# Patient Record
Sex: FEMALE | NOT HISPANIC OR LATINO | Employment: OTHER | URBAN - METROPOLITAN AREA
[De-identification: names, ages, dates, MRNs, and addresses within clinical notes are randomized per-mention and may not be internally consistent; named-entity substitution may affect disease eponyms.]

---

## 2023-07-21 ENCOUNTER — TRANSFERRED RECORDS (OUTPATIENT)
Dept: HEALTH INFORMATION MANAGEMENT | Facility: CLINIC | Age: 63
End: 2023-07-21

## 2023-11-21 ENCOUNTER — TRANSFERRED RECORDS (OUTPATIENT)
Dept: HEALTH INFORMATION MANAGEMENT | Facility: CLINIC | Age: 63
End: 2023-11-21

## 2024-04-16 ENCOUNTER — TRANSFERRED RECORDS (OUTPATIENT)
Dept: HEALTH INFORMATION MANAGEMENT | Facility: CLINIC | Age: 64
End: 2024-04-16
Payer: COMMERCIAL

## 2024-04-17 ENCOUNTER — TRANSCRIBE ORDERS (OUTPATIENT)
Dept: OTHER | Age: 64
End: 2024-04-17

## 2024-04-17 DIAGNOSIS — C50.919 BREAST CANCER (H): Primary | ICD-10-CM

## 2024-04-18 ENCOUNTER — PATIENT OUTREACH (OUTPATIENT)
Dept: ONCOLOGY | Facility: CLINIC | Age: 64
End: 2024-04-18
Payer: COMMERCIAL

## 2024-04-18 ENCOUNTER — PRE VISIT (OUTPATIENT)
Dept: ONCOLOGY | Facility: CLINIC | Age: 64
End: 2024-04-18
Payer: COMMERCIAL

## 2024-04-18 NOTE — TELEPHONE ENCOUNTER
RECORDS STATUS - BREAST    RECORDS REQUESTED FROM: Dr. Hung Paz [facility]  in regards to breast cancer.   Labs, pathology, imaging/reports, clinic notes, treatment/chemo ect.    Appt Date: TBD NN WQ    Breast Cancer    Records Requested     April 18, 2024 9:14 AM  [facility]   Facility  Dr. Hung Paz [facility]    Outcome I called [phone] (they use a third party vendor for records) #3 (Check Status)  #1 (Medical Provider) Fax: [phone]    I called Ph: [phone] #0 - I spoke to an  and they couldn't confirm if records are accessible through [facility].     I tried to pull records into [facility] but didn't find a match with any records in [place].     I faxed over a formal request for records to Fax: [phone] [facility]       NOTES DETAILS STATUS   OFFICE NOTE from referring provider     OFFICE NOTE from medical oncologist Requested Recs from [facility]    OFFICE NOTE from surgeon     OFFICE NOTE from radiation oncologist     DISCHARGE SUMMARY from hospital     DISCHARGE REPORT from the ER     OPERATIVE REPORT     MEDICATION LIST     CLINICAL TRIAL TREATMENTS TO DATE     LABS     REQUEST BLOCKS FOR ALL BREAST CANCER PTS     PATHOLOGY REPORTS  (Tissue diagnosis, Stage, ER/AZ percentage positive and intensity of staining, HER2 IHC, FISH, and all biopsies from breast and any distant metastasis)                     GENONOMIC TESTING     TYPE:   (Next Generation Sequencing, including Foundation One testing, and Oncotype score)     IMAGING (NEED IMAGES & REPORT)     CT SCANS     MRI     MAMMO     ULTRASOUND     PET     BONE SCAN     BRAIN MRI

## 2024-04-18 NOTE — PROGRESS NOTES
New Patient Oncology Nurse Navigator Note     Referring provider:  Self Referral      Referring Clinic/Organization: Dr. Hung Paz [facility]      Referred to (specialty:) Medical Oncology and Radiation Oncology     Requested provider (if applicable): NA     Date Referral Received: April 17, 2024     Evaluation for:  Breast cancer  Referral received by phone from patient, lives in [place] half of year, needs oncologist when in [place], will be starting radiation treatment. Returning to [place] 5/4. Dr. Hung Paz, [facility], finishing chemo w/o 4/15. She has requested all records.      Clinical History (per Nurse review of records provided):       Records Location:      Records Needed: Medical Records form Dr. Hung Paz [facility]      Additional testing needed prior to consult: ?    Payor: BC / Plan: [employer] Funding Source FED[employer] EMPLOYEE PROGRAM / Product Type: PPO /     April 18, 2024  Called patient this morning to introduce self and role of nurse navigator as well as discuss referral. Patient did not answer her phone so a detailed message was left for patient to call back.     April 25, 2024  Called patient again this afternoon in follow up of self referral. Still working on getting records for review to be able to schedule patient. Patient did not answer again so message was left for her requesting her to follow up. Message sent to records team about update in obtaining records.     April 25, 2024 3:25 PM  Patient returned NN call. She was was diagnosed on Mammogram last year in June. She had a lumpectomy with 4 lymph nodes sampled and were ALL positive. She then had a full lymph node dissection removing 20 lymph nodes and only 1 of the 20 were positive. She then started chemotherapy treatment and just completed it mid April. She will be coming back to [place] on 5/4/24 and will need to establish care with medical oncology and radiation oncology. She lives in [facility] and would prefer that location. Informed  her that we are still working getting records and a message was sent to records team. Slot on hold for 5/9/24 at [facility] with Dr. Jarvis at 11 AM and Dr. Jacob at 3 PM.  Patient verbalized understanding. Will follow up once records are received.    April 26, 2024  Records are received and reviewed from [facility].  Patient underwent a bilateral screening mammogram on 7/5/2023, it showed a mass with obscured margin in the right breast at 10 o'clock position. Ultrasound done on 7/11/2023 showed right breast mass at 10 o'clock position, 7 cm from nipple, 0.8 x 0.6 x 0.6 cm in size. She underwent right sided ultrasound guided biopsy on 7/11/2023. It showed invasive lobular carcinoma. She then underwent bilateral breast MRI on 7/27/23 showed biopsy proven right breast carcinoma 2 x 1.2 x 1.1 cm in size. She under went a right breast lumpectomy with sentinel lymph node dissection. She underwent complete right sided axillary lymph node dissection on 10/12/23, pathology showed metastatic carcinoma involving 1 out of 20 lymph nodes, 4 mm in size, and no extra-joanna extension. She underwent a PET scan on 9/26/23 showed postoperative inflammatory changes in the right breast and axillary region. 10/12/2023 she under went complete right axillary lymph node dissection. She was found to have 4/4 lymph nodes positive for metastatic disease on SLN biopsy procedure. It was recommended that patient receive neoadjuvant treatment with chemo/radiation/hormone therapy. Patient was initiated on treatment with AC + Dose-dense Taxol on 11/21/2023 and completed on 4/16/24.     Referrals sent to [facility] to schedule.     Nakia VALENCIAN, RN   Oncology Nurse Navigator   [facility]   [phone] / [phone]

## 2024-04-23 ENCOUNTER — TRANSFERRED RECORDS (OUTPATIENT)
Dept: HEALTH INFORMATION MANAGEMENT | Facility: CLINIC | Age: 64
End: 2024-04-23
Payer: COMMERCIAL

## 2024-04-23 LAB
ALT SERPL-CCNC: 29 U/L (ref 0–31)
AST SERPL-CCNC: 21 U/L (ref 0–31)
CREATININE (EXTERNAL): 0.6 MG/DL (ref 0.3–1.2)
GFR ESTIMATED (EXTERNAL): 100.8 ML/MIN (ref 60–200)
GLUCOSE (EXTERNAL): 98 MG/DL (ref 70–105)
POTASSIUM (EXTERNAL): 3.6 MEQ/L (ref 3.3–5.1)

## 2024-04-24 ENCOUNTER — TRANSFERRED RECORDS (OUTPATIENT)
Dept: HEALTH INFORMATION MANAGEMENT | Facility: CLINIC | Age: 64
End: 2024-04-24
Payer: COMMERCIAL

## 2024-04-25 ENCOUNTER — PRE VISIT (OUTPATIENT)
Dept: ONCOLOGY | Facility: CLINIC | Age: 64
End: 2024-04-25
Payer: COMMERCIAL

## 2024-04-25 NOTE — TELEPHONE ENCOUNTER
RECORDS STATUS - BREAST    Action    Action Taken 4/25/24  Follow up call to [employer], on hold for 20+mins, spoke w/ Chelsey - advised no prior request received. Was provided w/ fax: [phone] - refaxed request.    Spoke w/ Pt - advised the following:    -Recently completed Chemo   -Pt did CT Scan 4/24/24 @ [facility]  -Pt last saw Dr. Paz: 4/23/24, has follow up 5/2/24    Advised pt of no traction on records received despite pt requesting records to be sent to us. Pt advised they will reach out to a PA @ Dr. Paz's office tomorrow & again try to have records sent to us. Advised pt we would also need a disc. Provided pt w/ writers name, contact information & fax.  3:28 PM    4/26/24  Records from [facility] received, sent to [facility] for STAT upload; sent to  Email, CC Nakia FREGOSO     [employer] Report from  Received, sent to [facility] for STAT upload, sent to  email, CC Nakia FREGOSO     PET, MRI from [facility] resolved to PACS.    Email sent to  [facility] to resolve imaging (Mammograms, Ultrasounds - Breast).  12:29 PM    5/7/24  Records from [facility] received, sent to [facility] for STAT upload  11:52 AM       RECORDS REQUESTED FROM: [facility], [facility]   DATE REQUESTED:    NOTES DETAILS STATUS   OFFICE NOTE from medical oncologist [facility] - Requested 4/18 & 4/25 [facility]  Dr. Hung Paz    [facility]  Dr. Isaiah Oakley: 10/6/23   OFFICE NOTE from surgeon  -  Dr. Shyanne Love: 10/31/23   OPERATIVE REPORT [facility] -  10/12/23: Right Axillary Node Dissection  8/31/23: Right Breast Lumpectomy   LABS     PATHOLOGY REPORTS  (Tissue diagnosis, Stage, ER/[place] percentage positive and intensity of staining, HER2 IHC, FISH, and all biopsies from breast and any distant metastasis)                 [facility], Reports in [facility]  10/12/23: [id number]    [facility]  8/31/23: [id number]  7/11/23: [id number]    GENONOMIC TESTING     TYPE:   (Next Generation Sequencing, including Foundation One testing, and Oncotype score) [facility] - Requested 4/25 7/20/23   IMAGING (NEED IMAGES & REPORT)     CT SCANS Requested 4/25 [facility]  4/24/24   MRI PENDING RESOLUTION FROM [facility] PACS 4/26 [facility]  7/27/23   MAMMO PENDING RESOLUTION FROM  PACS 4/26 [facility]  8/30/23, 7/11/23, 7/5/23, 3/7/18, 4/29/15   ULTRASOUND PENDING RESOLUTION FROM [facility] PACS 4/26 [facility]  7/11/23   PET PACS [facility]  9/26/23   [employer] Tracking: [facility] [id number]

## 2024-05-09 ENCOUNTER — ONCOLOGY VISIT (OUTPATIENT)
Dept: ONCOLOGY | Facility: HOSPITAL | Age: 64
End: 2024-05-09
Attending: INTERNAL MEDICINE
Payer: COMMERCIAL

## 2024-05-09 ENCOUNTER — OFFICE VISIT (OUTPATIENT)
Dept: RADIATION ONCOLOGY | Facility: CLINIC | Age: 64
End: 2024-05-09
Attending: INTERNAL MEDICINE
Payer: COMMERCIAL

## 2024-05-09 VITALS
RESPIRATION RATE: 18 BRPM | HEIGHT: 63 IN | WEIGHT: 154.1 LBS | HEART RATE: 77 BPM | BODY MASS INDEX: 27.3 KG/M2 | SYSTOLIC BLOOD PRESSURE: 122 MMHG | OXYGEN SATURATION: 97 % | DIASTOLIC BLOOD PRESSURE: 72 MMHG

## 2024-05-09 VITALS
TEMPERATURE: 98 F | SYSTOLIC BLOOD PRESSURE: 126 MMHG | RESPIRATION RATE: 16 BRPM | WEIGHT: 155.3 LBS | HEART RATE: 89 BPM | DIASTOLIC BLOOD PRESSURE: 59 MMHG | OXYGEN SATURATION: 98 % | BODY MASS INDEX: 27.51 KG/M2

## 2024-05-09 DIAGNOSIS — Z17.0 MALIGNANT NEOPLASM OF UPPER-OUTER QUADRANT OF RIGHT BREAST IN FEMALE, ESTROGEN RECEPTOR POSITIVE (H): ICD-10-CM

## 2024-05-09 DIAGNOSIS — Z78.0 MENOPAUSE: Primary | ICD-10-CM

## 2024-05-09 DIAGNOSIS — Z79.811 LONG TERM CURRENT USE OF AROMATASE INHIBITOR: ICD-10-CM

## 2024-05-09 DIAGNOSIS — C50.411 MALIGNANT NEOPLASM OF UPPER-OUTER QUADRANT OF RIGHT BREAST IN FEMALE, ESTROGEN RECEPTOR POSITIVE (H): ICD-10-CM

## 2024-05-09 DIAGNOSIS — C50.411 MALIGNANT NEOPLASM OF UPPER-OUTER QUADRANT OF RIGHT BREAST IN FEMALE, ESTROGEN RECEPTOR POSITIVE (H): Primary | ICD-10-CM

## 2024-05-09 DIAGNOSIS — Z17.0 MALIGNANT NEOPLASM OF UPPER-OUTER QUADRANT OF RIGHT BREAST IN FEMALE, ESTROGEN RECEPTOR POSITIVE (H): Primary | ICD-10-CM

## 2024-05-09 PROCEDURE — 99205 OFFICE O/P NEW HI 60 MIN: CPT | Mod: 25 | Performed by: STUDENT IN AN ORGANIZED HEALTH CARE EDUCATION/TRAINING PROGRAM

## 2024-05-09 PROCEDURE — 99213 OFFICE O/P EST LOW 20 MIN: CPT | Performed by: INTERNAL MEDICINE

## 2024-05-09 PROCEDURE — 77263 THER RADIOLOGY TX PLNG CPLX: CPT | Performed by: STUDENT IN AN ORGANIZED HEALTH CARE EDUCATION/TRAINING PROGRAM

## 2024-05-09 PROCEDURE — 99214 OFFICE O/P EST MOD 30 MIN: CPT | Mod: 27 | Performed by: STUDENT IN AN ORGANIZED HEALTH CARE EDUCATION/TRAINING PROGRAM

## 2024-05-09 PROCEDURE — 99417 PROLNG OP E/M EACH 15 MIN: CPT | Performed by: STUDENT IN AN ORGANIZED HEALTH CARE EDUCATION/TRAINING PROGRAM

## 2024-05-09 PROCEDURE — 99205 OFFICE O/P NEW HI 60 MIN: CPT | Performed by: INTERNAL MEDICINE

## 2024-05-09 PROCEDURE — G2211 COMPLEX E/M VISIT ADD ON: HCPCS | Performed by: INTERNAL MEDICINE

## 2024-05-09 RX ORDER — TRAZODONE HYDROCHLORIDE 300 MG/1
300 TABLET ORAL
COMMUNITY
Start: 2023-02-16

## 2024-05-09 RX ORDER — ANASTROZOLE 1 MG/1
1 TABLET ORAL
COMMUNITY
Start: 2024-04-23 | End: 2024-09-29

## 2024-05-09 RX ORDER — MONTELUKAST SODIUM 10 MG/1
1 TABLET ORAL EVERY EVENING
COMMUNITY
Start: 2022-12-07

## 2024-05-09 RX ORDER — LOPERAMIDE HCL 2 MG
CAPSULE ORAL
Qty: 30 CAPSULE | Refills: 0 | Status: SHIPPED | OUTPATIENT
Start: 2024-05-15 | End: 2024-08-15

## 2024-05-09 RX ORDER — POTASSIUM CHLORIDE 750 MG/1
1 TABLET, EXTENDED RELEASE ORAL
COMMUNITY
Start: 2024-03-06

## 2024-05-09 RX ORDER — VERAPAMIL HYDROCHLORIDE 180 MG/1
180 CAPSULE, EXTENDED RELEASE ORAL DAILY
COMMUNITY
Start: 2023-03-08

## 2024-05-09 RX ORDER — FLUTICASONE PROPIONATE 50 MCG
2 SPRAY, SUSPENSION (ML) NASAL DAILY
COMMUNITY
Start: 2022-12-09

## 2024-05-09 RX ORDER — IPRATROPIUM BROMIDE 21 UG/1
SPRAY, METERED NASAL
COMMUNITY

## 2024-05-09 RX ORDER — DULOXETIN HYDROCHLORIDE 60 MG/1
60 CAPSULE, DELAYED RELEASE ORAL DAILY
COMMUNITY
Start: 2023-08-08 | End: 2024-08-07

## 2024-05-09 RX ORDER — CETIRIZINE HYDROCHLORIDE 10 MG/1
10 TABLET ORAL DAILY
COMMUNITY

## 2024-05-09 RX ORDER — ROLAPITANT 90 MG/1
TABLET ORAL
COMMUNITY
Start: 2023-12-12

## 2024-05-09 RX ORDER — PROCHLORPERAZINE MALEATE 10 MG
10 TABLET ORAL EVERY 6 HOURS PRN
Qty: 30 TABLET | Refills: 2 | Status: SHIPPED | OUTPATIENT
Start: 2024-05-15 | End: 2024-08-15

## 2024-05-09 RX ORDER — CEFUROXIME AXETIL 250 MG/1
6 TABLET ORAL PRN
COMMUNITY
Start: 2023-11-24

## 2024-05-09 ASSESSMENT — PAIN SCALES - GENERAL
PAINLEVEL: NO PAIN (0)
PAINLEVEL: NO PAIN (0)

## 2024-05-09 NOTE — LETTER
5/9/2024         RE: Lizbet Nunez  [address] [place] [address]        Dear Colleague,    Thank you for referring your patient, Lizbet Nunez, to the [facility] RADIATION ONCOLOGY [place]. Please see a copy of my visit note below.    [facility] Radiation Oncology Consult Note     Patient: Lizbet Nunez  MRN: [id number]  Date of Service: 05/09/2024          Referred MD Ciro  No address on file       Dear Ms Lizbet Nunez:    Thank you very much for referring this patient for consideration of radiotherapy. As you know Ms. Nunez is a 63 year old female with a diagnosis of right breast invasive ductal and lobular carcinoma, stage IIIA (pT1c, pN2a cM0)  HR+; HER2- s/p right lumpectomy and sentinel lymph node biopsy 8/31/23 with negative margins and 4 of 4 lymph nodes positive followed by axillary dissection with an additional 1 of 20 lymph nodes positive. She completed adjuvant chemotherapy in [place] and was seen today for consideration of adjuvant radiation therapy.     HISTORY OF PRESENT ILLNESS:   Ms. Nunez is a 63 year old female who had an abnormal screening mammogram    7/5/2023 screening mammogram: Mass with obscured margins in the right breast at the 10 o'clock position posterior depth.    7/11/2023 ultrasound-guided core needle biopsy: 10 o'clock position in the right breast 7 cm from the nipple, pathology ([id number]): invasive lobular carcinoma, grade 2.  Associated LCIS.  ER positive (90%), VT positive (100%) HER2/kenny negative.    7/21/2023 genetics, [facility]: Positive for 1 variant of uncertain significance, BAP 1 C8.1857C> T    7/27/2023 MRI: 2 x 1.2 x 1.1 cm irregularly enhancing mass in the right breast 10:00 middle depth.  No other suspicious areas or enlarged lymph nodes.    8/31/2023 right breast lumpectomy with sentinel lymph node biopsy, pathology ([id number]):  Invasive carcinoma with mixed ductal and pleomorphic lobular features, grade 2, 2 cm.  Margins negative (4  mm anterior and medial).  Associated DCIS, grade 2 cribriform.  No EIC.  Associated LCIS.  Margins negative (1 mm posterior to DCIS).  No lymphovascular invasion.    Right axillary sentinel lymph node biopsy: 4 of 4 lymph nodes positive (4/4), largest 5 mm.  No extranodal extension.  2 lymph nodes with macrometastases, 2 lymph nodes with micrometastases.  pT1c pN2a    9/26/2023 PET/CT: Postoperative changes upper outer right breast with seroma measuring 2.6 x 3.7 x 3.1 cm.  Postoperative changes right axilla with low-level inflammatory uptake.  No evidence of locally recurrent residual malignancy.    10/12/2023 right axillary dissection, pathology ([id number]): Metastatic adenocarcinoma involving 1 of 20 lymph nodes.  4 mm in size.  No extranodal extension.    Referred to lymphedema therapy but never went.  Spent winter in [place]:  11/21/2023 - 4/16/2024 adjuvant chemotherapy: AC Dose-dense Taxol     4/24/2024 CT: Postsurgical changes right breast and axilla.  No adenopathy.  Small pericardial effusion.    She continues to have some fatigue, peripheral neuropathy and nail changes following chemotherapy.  Her alopecia is slowly resolving.  She has some tightness in the right axilla with scarring/firmness.  She has not noted any swelling of the upper extremity or breast (but recognizes right sided lymphedema once pointed out)    Met with heme-oncMD Jarvis with plans to begin anastrozole anytime and start abemaciclib after radiation    No tobacco use  Family history positive for breast cancer in both maternal (65) and paternal (70s) aunts    CHEMOTHERAPY HISTORY: Concurrent Chemotherapy: No  Neoadjuvant dd AC + T  Adjuvant anastrozole and abemaciclib-she is currently thinking she is going to hold off on starting anastrozole but discussed she may begin during radiation    RADIATION THERAPY HISTORY: Prior Radiation: No    IMPLANTED CARDIAC DEVICE: none     PREGNANCY: N/A    Current Outpatient Medications   Medication  Sig Dispense Refill     cetirizine (ZYRTEC) 10 MG tablet Take 10 mg by mouth daily       DULoxetine (CYMBALTA) 60 MG capsule Take 60 mg by mouth daily       fluticasone (FLONASE) 50 MCG/ACT nasal spray Spray 2 sprays into both nostrils daily       ipratropium (ATROVENT) 0.03 % nasal spray USE 1 TO 2 SPRAYS IN EACH NOSTRIL THREE TIMES DAILY AS NEEDED FOR ALLERGIES       montelukast (SINGULAIR) 10 MG tablet Take 1 tablet by mouth every evening       potassium chloride ER (K-TAB/KLOR-CON) 10 MEQ CR tablet Take 1 tablet by mouth daily at 2 pm       SUMAtriptan (IMITREX STATDOSE) 6 MG/0.5ML pen injector kit Inject 6 mg Subcutaneous as needed       traZODone HCl 300 MG TABS Take 300 mg by mouth       VARUBI, 180 MG DOSE, 2 x 90 MG tablet        verapamil ER (VERELAN) 180 MG 24 hr capsule Take 180 mg by mouth daily       anastrozole (ARIMIDEX) 1 MG tablet Take 1 tablet by mouth daily at 2 pm (Patient not taking: Reported on 5/9/2024)       [START ON 5/15/2024] loperamide (IMODIUM) 2 MG capsule Start with 2 caps (4 mg), then take one cap (2 mg) after each diarrheal stool as needed. Do not use more than 8 caps (16 mg) per day. 30 capsule 0     [START ON 5/15/2024] prochlorperazine (COMPAZINE) 10 MG tablet Take 1 tablet (10 mg) by mouth every 6 hours as needed for nausea or vomiting 30 tablet 2     No past medical history on file.  Past Surgical History:   Procedure Laterality Date     IR CHEST PORT PLACEMENT > 5 YRS OF AGE  11/7/2023     Allergies   Allergen Reactions     Aripiprazole Other (See Comments)     Severe Paranoia     Iodinated Contrast Media Other (See Comments)     PN: LW CM1: CONTRAST- NKA Reaction :     No family history on file.  Social History     Socioeconomic History     Marital status:      Spouse name: Not on file     Number of children: Not on file     Years of education: Not on file     Highest education level: Not on file   Occupational History     Not on file   Tobacco Use     Smoking status:  Not on file     Smokeless tobacco: Not on file   Substance and Sexual Activity     Alcohol use: Not on file     Drug use: Not on file     Sexual activity: Not on file   Other Topics Concern     Not on file   Social History Narrative     Not on file     Social Determinants of Health     Financial Resource Strain: Not on file   Food Insecurity: Not on file   Transportation Needs: Not on file   Physical Activity: Not on file   Stress: Not on file   Social Connections: Not on file   Interpersonal Safety: Not on file   Housing Stability: Not on file        REVIEW OF SYMPTOMS:  A full 14-point review of systems was performed. Pertinent findings are noted in the HPI.    General  Constitutional  Constitutional (WDL): Exceptions to WDL  Fatigue: Fatigue relieved by rest  EENT  Eye Disorders  Eye Disorder (WDL): All eye disorder elements are within defined limits (wears glasses)  Ear Disorders  Ear Disorder (WDL): All ear disorder elements are within defined limits  Respiratory  Respiratory  Respiratory (WDL): All respiratory elements are within defined limits  Cardiovascular  Cardiovascular  Cardiovascular (WDL): Exceptions to WDL (no pacemaker, port-a-cath in place on left chest)  Gastrointestinal  Gastrointestinal  Gastrointestinal (WDL): Exceptions to WDL  Anorexia: Loss of appetite without alteration in eating habits  Dysgeusia: Altered taste but no change in diet  Constipation: Occasional or intermittent symptoms OR occasional use of stool softeners, laxatives, dietary modification, or enema  Musculoskeletal  Musculoskeletal and Connective Tissue Disorders  Musculoskeletal & Connective (WDL): All musculoskeletal & connective elements are within defined limits  Integumentary  Integumentary  Integumentary (WDL): Exceptions to WDL (Right breast sugery 8/2023, darkening of skin since chemotherapy- last chemo 4/23/2024)  Alopecia: Hair loss of less than 50% of normal for that individual that is not obvious from a distance  but only on close inspection OR a different hair style may be required to cover the hair loss but it does not require a wig or hair piece to camouflage  Neurological  Neurosensory  Neurosensory (WDL): Exceptions to WDL  Peripheral Motor Neuropathy: Asymptomatic OR clinical or diagnostic observations only (hands & feet R>L)  Ataxia: Asymptomatic OR clinical or diagnostic observations only OR intervention not indicated  Peripheral Sensory Neuropathy: Asymptomatic (hands & feet R>L)  Dizziness: Mild unsteadiness or sensation of movement (occasionally)  Genitourinary/Reproductive  Genitourinary  Genitourinary (WDL): Exceptions to WDL  Urinary Frequency: Present (nocturia x2)  Lymphatic  Lymph System Disorders  Lymph (WDL): All lymph elements are within defined limits  Pain  Pain Score: No Pain (0)  AUA Assessment                                                              Accompanied by  Accompanied By: spouse ()    ECOG Status: 1 - Can't do physically strenuous work, but fully ambyulatory and can do light sedentary work    KPS Score: 90% Can perform normal activity, minor signs of disease    Pain Management Plan: N/A    Recent Labs: No results found for this or any previous visit (from the past 168 hour(s)).    Imaging: Imaging results 6 weeks:No results found.    Pathology:   No results found for this or any previous visit (from the past 8760 hour(s)).              Objective:          PHYSICAL EXAMINATION:    /59 (BP Location: Right arm, Patient Position: Sitting, Cuff Size: Adult Regular)   Pulse 89   Temp 98  F (36.7  C) (Oral)   Resp 16   Wt 70.4 kg (155 lb 4.8 oz)   SpO2 98%   BMI 27.51 kg/m      Gen: Alert, in NAD pleasant interactive, well nourished  Eyes: EOMI, sclera anicteric  HENT     Head: NC/AT, alopecia resolving  Pulm:  No wheezing, stridor or respiratory distress  Chest: Bilateral good breast symmetry.   Well-healed surgical scar of the right breast and axilla consistent with a  recent history of surgery.  No surrounding erythema, warmth or drainage.  Axillary firmness and fullness without discrete axillary nor supraclavicular LAD.    Right upper extremity lymphedema.  Musculoskeletal: Normal muscle bulk and tone  Skin: Normal tone and turgor, warm, dry, intact  Neurologic: A/Ox3, face symmetric, speech fluent, no focal motor deficits, gait normal and unaided  Psychiatric: Appropriate mood and affect       Intent of Therapy: Curative  Patient on concurrent Herceptin No  Adjuvant hormonal therapy: Yes Agent: Anastrozole (arimidex)  Start: Following radiation  Chemotherapy: Adjuvant ddAC+ T completed  Intended fractionation schedule 4256 cGy in 16 fractions +1000 cGy in 4 fraction boost    Breast cancer risk factors:   No obstetric history on file.  LMP Dates from Last 4 Encounters:   No data found for LMP      Side effects that may occur during or within weeks after radiation therapy    Fatigue and general weakness  Darkening, irritation, itchiness, redness, dryness, erythema, peeling, scabbing, ulceration and contraction of the skin of the breast and chest  Swelling of the breast  Loss of armpit hair  Lung irritation  Decrease in appetite    Side effects that may occur months or years after radiation therapy    Development of another tumor or cancer  Thickening, telangiectasias (development of spider like blood vessels in the skin) and ulceration of the skin of the breast and chest  Firming, fibrosis (scar tissue), fat necrosis, and distortion of the breast  Poor healing after a trauma or surgery in the irradiated area  Nerve damage resulting in loss of arm strength and sensation  Coronary artery blockage causing angina pain or a heart attack  Lung inflammation of fibrosis causing cough, fever and shortness of breath  Fracture of the ribs  Swellingof an arm and hand    The risks, benefits and alternatives to radiation therapy were outlined with the patient. All questions were answered and a  consent was signed.     Impression   63-year-old woman with right breast invasive ductal and lobular carcinoma, stage IIIA (pT1c, pN2a cM0)  HR+; HER2- s/p right lumpectomy and sentinel lymph node biopsy 8/31/23 with negative margins and 4 of 4 lymph nodes positive followed by axillary dissection with an additional 1 of 20 lymph nodes positive. She completed adjuvant chemotherapy ddAC+T in [place].    Right upper extremity lymphedema    Visit Dx:  (C50.411,  Z17.0) Malignant neoplasm of upper-outer quadrant of right breast in female, estrogen receptor positive (H)  (primary encounter diagnosis)  Plan: Lymphedema Therapy  Referral       Cancer Staging   Malignant neoplasm of upper-outer quadrant of right breast in female, estrogen receptor positive (H)  Staging form: Breast, AJCC 8th Edition  - Pathologic stage from 8/31/2023: Stage IB (pT1c, pN2a, cM0, G2, ER+, MI+, HER2-) - Signed by Tony Jarvis MD on 5/9/2024      Assessment & Plan:   Discussed adjuvant radiation therapy with patient and her .    The indications for, process of, alternatives, potential side effects and complications of RT to the right breast and regional lymph nodes were discussed.    They asked multiple questions which were answered to their satisfaction and they verbalized understanding.    They wish to proceed with adjuvant radiation therapy and consent is signed today.  They will return to clinic on next week for CT simulation for RT planning.     Anticipate 4256 cGy in 16 fractions +1000 cGy in 4 fraction boost.    She has already developed lymphedema of the right upper extremity so referral to lymphedema therapy placed.  Also has some scarring and fibrosis in the axilla she will work on exercises at home as well.     Thank you for allowing me to participate in the care of this patient. Feel free to contact me with all questions or concerns.      Total time of this visit, including time spent face-to-face with patient and  "or via video/audio, and also in preparing for today's visit for MDM and documentation. Medical decision-making included consideration and possible diagnoses, management options, complex record review, review of diagnostic tests and information, consideration and discussion of significant complications based on comorbidities, discussion with providers involved in the care of the patient.     90 Minutes spent.      Sincerely,      Sherri Jacob MD  Department of Radiation Oncology   [facility] Radiation Oncology  Tel: [phone]  Page: [phone]    [facility]  [address] [place] [address]     [facility]   [address] [place] [address]    CC:  Patient Care Team:  Leonie Manning MD as PCP - General (Family Medicine)  Tony Jarvis MD as MD (Hematology)  Sherri Jacob MD as MD (Radiation Oncology)          Considerations for radiation treatment   Pregnancy status: Female age 55+   Implanted Cardiac Devices: No, Port a cath in left chest   Any previous radiation therapy: No    Oncology Rooming Note    May 9, 2024 3:25 PM   Lizbet Nunez is a 63 year old female who presents for:    Chief Complaint   Patient presents with     Oncology Clinic Visit     Consult with Dr. Jacob     Initial Vitals: /59 (BP Location: Right arm, Patient Position: Sitting, Cuff Size: Adult Regular)   Pulse 89   Temp 98  F (36.7  C) (Oral)   Resp 16   Wt 70.4 kg (155 lb 4.8 oz)   SpO2 98%   BMI 27.51 kg/m   Estimated body mass index is 27.51 kg/m  as calculated from the following:    Height as of an earlier encounter on 5/9/24: 1.6 m (5' 3\").    Weight as of this encounter: 70.4 kg (155 lb 4.8 oz). Body surface area is 1.77 meters squared.  No Pain (0) Comment: Data Unavailable   No LMP recorded.  Allergies reviewed: Yes  Medications reviewed: Yes    Medications: Medication refills not needed today.  Pharmacy name entered into [employer]: [id number] - [place], [place] - " [address] [facility] [address]    Frailty Screening:   Is the patient here for a new oncology consult visit in cancer care? 2. No      Clinical concerns: Patient here ambulatory accompanied by  for radiation consult for her breast cancer.  Patient states she completed her chemotherapy in [place] on 4/23/2024.  Patient continues to have neuropathies in her hands and feet but feels this is improving.  Patient also had a lot of darkening of her skin during the chemotherapy which she feels is improving.  Patient had alopecia but her hair is starting to grow back.  20 minutes spent in review of radiation process and potential side effects.  Written information given for review.  Seen by Dr. Jacob.  Plan return to clinic for follow-up and/or CT simulation as directed by provider.  Dr. Jacob was notified.    Radiation Therapy Patient Education    Person involved with teaching: Patient and     Patient educational needs for self management of treatment-related side effects assessment completed.  [facility] Patient Ed tab contains Patient Learning Assessment    Education Materials Given  Managing Side Effects of Radiation Therapy: Care Instructions, Learning About External Beam Radiation Treatment, Dealing With Being Tired From Cancer Treatment: Care Instructions, Radiation Treatment For Cancer, Radiation Therapy to the Breast Guidelines, Exercising After a Breast Cancer Surgery: Care Instructions, Oncology Supportive Care Services , Welcome Letter, Insurance PA Information, and Map [facility]    Educational Topics Discussed  Side effects expected, Skin care, and When to call MD/RN    Response To Teaching  More review necessary    GYN Only  Vaginal Dilator-given and educated: N/A    Referrals sent: None    Chemotherapy?  Yes: Last chemotherapy completed 4/23/2024 in [place], patient has now established care with Dr. Jarvis medical oncology for hormonal therapy here in [place].          Valreie  DAINA Stephenson RN                Again, thank you for allowing me to participate in the care of your patient.        Sincerely,        Sherri Jacob MD

## 2024-05-09 NOTE — LETTER
"    5/9/2024         RE: Lizbet Nunez  [address] [place] [address]        Dear Colleague,    Thank you for referring your patient, Lizbet Nunez, to the [facility]. Please see a copy of my visit note below.    Oncology Rooming Note    May 9, 2024 11:08 AM   Lizbet Nunez is a 63 year old female who presents for:    Chief Complaint   Patient presents with     Oncology Clinic Visit     Malignant neoplasm of right breast in female, estrogen receptor positive, unspecified site of breast     Initial Vitals: /72 (BP Location: Left arm, Patient Position: Sitting, Cuff Size: Adult Regular)   Pulse 77   Resp 18   Ht 1.6 m (5' 3\")   Wt 69.9 kg (154 lb 1.6 oz)   SpO2 97%   BMI 27.30 kg/m   Estimated body mass index is 27.3 kg/m  as calculated from the following:    Height as of this encounter: 1.6 m (5' 3\").    Weight as of this encounter: 69.9 kg (154 lb 1.6 oz). Body surface area is 1.76 meters squared.  No Pain (0) Comment: Data Unavailable   No LMP recorded.  Allergies reviewed: Yes  Medications reviewed: Yes    Medications: Medication refills not needed today.  Pharmacy name entered into [employer]: [id number] - [facility] [address] AT [facility]    Frailty Screening:   Is the patient here for a new oncology consult visit in cancer care? 1. Yes. Over the past month, have you experienced difficulty or required a caregiver to assist with:   1. Balance, walking or general mobility (including any falls)? NO  2. Completion of self-care tasks such as bathing, dressing, toileting, grooming/hygiene?  NO  3. Concentration or memory that affects your daily life?  NO       Clinical concerns: new patient consult.       Jessenia Solorzano MA              [facility] Hematology and Oncology Consult Note    Patient: Lizbet Nunez  MRN: [id number]  Date of Service: May 9, 2024       Reason for Visit    Chief Complaint   Patient presents with     " Oncology Clinic Visit     Malignant neoplasm of right breast in female, estrogen receptor positive, unspecified site of breast         Assessment/Plan    ECOG Performance 0 - Independent    #.  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  ER positive, DC positive, HER2 negative (0 by IHC)     I reviewed her extensive medical history and available records.  Requested most recent clinic visit notes from [facility].  At this point, she completed surgery and adjuvant chemotherapy.  She will be meeting with Dr. Jacob this evening to discuss about adjuvant radiation therapy.   Today, I discussed about adjuvant endocrine therapy and CDK 4/6 inhibitor.  I advised that she can start anastrozole anytime.  Total duration of endocrine therapy is over 10 years.  She is okay to hold anastrozole during radiation therapy if advised by Dr. Jacob.   I discussed about adjuvant abemaciclib.  She clearly has indication for adjuvant CDK 4/6 inhibitor based on presence of 4 positive lymph nodes.  I reviewed the side effects and schedule.  I will plan to start after completion of radiation therapy.  I gave her handout to review.  She consented me to obtain prior authorization.   Follow-up with me about a week after completion of radiation.    #.  Small pericardial effusion of unknown clinical significance   Recent CT scan from a couple weeks ago showed small pericardial effusion.  She does not have any symptoms.  I recommended to continue to monitor clinically at this point.  We might need to follow-up echocardiogram with symptoms.    #.  Bone health  -She takes multivitamin a day.  Provided daily requirement amount of calcium and vitamin D.  -Advised her to obtain DEXA scan for baseline.  She never had one before.  -Discussed about weightbearing exercises.    Encounter Diagnoses:    Problem List Items Addressed This Visit          Oncology  Diagnoses    Malignant neoplasm of upper-outer quadrant of right breast in female, estrogen receptor positive (H)    Relevant Medications    prochlorperazine (COMPAZINE) 10 MG tablet (Start on 5/15/2024)    loperamide (IMODIUM) 2 MG capsule (Start on 5/15/2024)    Other Relevant Orders    CBC with platelets differential    Comprehensive metabolic panel       Other    Long term current use of aromatase inhibitor     Other Visit Diagnoses       Menopause    -  Primary    Relevant Orders    DX Bone Density            ______________________________________________________________________________    Staging History   Cancer Staging   Malignant neoplasm of upper-outer quadrant of right breast in female, estrogen receptor positive (H)  Staging form: Breast, AJCC 8th Edition  - Pathologic stage from 8/31/2023: Stage IB (pT1c, pN2a, cM0, G2, ER+, UT+, HER2-) - Signed by Tony Jarvis MD on 5/9/2024        History    Ms. Lizbet Nunez is a very pleasant 63 year old female presented today accompanied byNestor for history of right breast cancer management.    7/2023-screening mammogram detected right breast cancer.  Sequent diagnostic mammogram and ultrasound demonstrated 0.8 x 0.6 x 0.6 cm hypoechoic mass 10 o'clock position in the right breast.  No suspicious lymph nodes in the right axilla.   -Core needle biopsy showed invasive lobular carcinoma with pleomorphic features, grade 2.  ER 90%, %, HER2 0 by IHC.    7/27/2023-bilateral breast MRI showed 2 x 1.2 x 1.1 cm right breast cancer without evidence of lymphadenopathy.    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    8/31/2023-right breast lumpectomy and right axillary sentinel lymph node biopsy.   Invasive carcinoma with mixed ductal and pleomorphic lobular features, grade 2.  2 cm.  Margins were negative.   DCIS present with closest skin margin to 1 mm.   4/4 sentinel lymph nodes were positive (2 macro mets and 2 micro mets) for metastatic carcinoma  "with largest metastatic deposit of 5 mm.  Negative for extranodal extension.   DCIS and LCIS present.  ER 90%, MA 80%, HER2 1+ by IHC.   pT1c N2a M0    9/26/2023-PET scan showed no evidence of distant metastasis    10/12/2023-right axillary lymph node dissection   1 of 20 lymph node showed metastatic adenocarcinoma, 4 mm in size.  No extranodal extension.    11/21/2023-4/23/2024-completed adjuvant chemotherapy with dose dense AC followed by weekly paclitaxel in [place].    4/24/2024-CT scan showed interval development of small pericardial effusion.  No pleural effusion.  She has seroma in the right axilla.  No next of malignancy.     Today, she reported that she is slowly recovering from chemotherapy side effects.  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  They are not painful.  Her energy level is slowly improving.  She does not have any unusual headaches.  No bone pain.   She was prescribed anastrozole but she has not started yet.    LMP 20 years ago.    Review of systems.  Apart from describing in history, the remainder of comprehensive ROS was negative.    Past History    No past medical history on file.  Past Surgical History:   Procedure Laterality Date     IR CHEST PORT PLACEMENT > 5 YRS OF AGE  11/7/2023     No family history on file.  Social History     Socioeconomic History     Marital status:        Allergies    Allergies   Allergen Reactions     Aripiprazole Other (See Comments)     Severe Paranoia     Iodinated Contrast Media Other (See Comments)     PN: LW CM1: CONTRAST- NKA Reaction :       Physical Exam    /72 (BP Location: Left arm, Patient Position: Sitting, Cuff Size: Adult Regular)   Pulse 77   Resp 18   Ht 1.6 m (5' 3\")   Wt 69.9 kg (154 lb 1.6 oz)   SpO2 97%   BMI 27.30 kg/m      General: alert, awake, not in acute distress  HEENT: Head: Normal, normocephalic, atraumatic.  Eye: " Normal external eye, conjunctiva, lids cornea, YUMIKO.  Nose: Normal external nose, mucus membranes and septum.  Pharynx: Normal buccal mucosa. Normal pharynx.  Neck / Thyroid: Supple, no masses, nodes, nodules or enlargement.  Lymphatics: No abnormally enlarged lymph nodes.  Chest: Normal chest wall and respirations. Clear to auscultation.  Heart: S1 S2 RRR, no murmur.   Abdomen: abdomen is soft without significant tenderness, masses, organomegaly or guarding  Extremities: normal strength, tone, and muscle mass  Skin: normal. no rash or abnormalities  CNS: non focal.    Lab Results    No results found for this or any previous visit (from the past 168 hour(s)).    Imaging Results    No results found.    60 minutes spent on the date of the encounter doing chart review, history and exam, documentation, communication of the treatment plan with the care team and further activities as noted above.    Signed by: Tony Jarvis MD       Again, thank you for allowing me to participate in the care of your patient.        Sincerely,        Tony Jarvis MD

## 2024-05-09 NOTE — PROGRESS NOTES
[facility] Radiation Oncology Consult Note     Patient: Lizbet Nunez  MRN: [id number]  Date of Service: 05/09/2024          Referred MD Ciro  No address on file       Dear Ms Lizbet Nunez:    Thank you very much for referring this patient for consideration of radiotherapy. As you know Ms. Nunez is a 63 year old female with a diagnosis of right breast invasive ductal and lobular carcinoma, stage IIIA (pT1c, pN2a cM0)  HR+; HER2- s/p right lumpectomy and sentinel lymph node biopsy 8/31/23 with negative margins and 4 of 4 lymph nodes positive followed by axillary dissection with an additional 1 of 20 lymph nodes positive. She completed adjuvant chemotherapy in [place] and was seen today for consideration of adjuvant radiation therapy.     HISTORY OF PRESENT ILLNESS:   Ms. Nunez is a 63 year old female who had an abnormal screening mammogram    7/5/2023 screening mammogram: Mass with obscured margins in the right breast at the 10 o'clock position posterior depth.    7/11/2023 ultrasound-guided core needle biopsy: 10 o'clock position in the right breast 7 cm from the nipple, pathology ([id number]): invasive lobular carcinoma, grade 2.  Associated LCIS.  ER positive (90%), [place] positive (100%) HER2/kenny negative.    7/21/2023 genetics, [facility]: Positive for 1 variant of uncertain significance, BAP 1 [id number]> T    7/27/2023 MRI: 2 x 1.2 x 1.1 cm irregularly enhancing mass in the right breast 10:00 middle depth.  No other suspicious areas or enlarged lymph nodes.    8/31/2023 right breast lumpectomy with sentinel lymph node biopsy, pathology ([id number]):  Invasive carcinoma with mixed ductal and pleomorphic lobular features, grade 2, 2 cm.  Margins negative (4 mm anterior and medial).  Associated DCIS, grade 2 cribriform.  No EIC.  Associated LCIS.  Margins negative (1 mm posterior to DCIS).  No lymphovascular invasion.    Right axillary sentinel lymph node biopsy: 4 of 4 lymph nodes positive (4/4), largest 5  mm.  No extranodal extension.  2 lymph nodes with macrometastases, 2 lymph nodes with micrometastases.  pT1c pN2a    9/26/2023 PET/CT: Postoperative changes upper outer right breast with seroma measuring 2.6 x 3.7 x 3.1 cm.  Postoperative changes right axilla with low-level inflammatory uptake.  No evidence of locally recurrent residual malignancy.    10/12/2023 right axillary dissection, pathology ([id number]): Metastatic adenocarcinoma involving 1 of 20 lymph nodes.  4 mm in size.  No extranodal extension.    Referred to lymphedema therapy but never went.  Spent winter in [place]:  11/21/2023 - 4/16/2024 adjuvant chemotherapy: AC Dose-dense Taxol     4/24/2024 CT: Postsurgical changes right breast and axilla.  No adenopathy.  Small pericardial effusion.    She continues to have some fatigue, peripheral neuropathy and nail changes following chemotherapy.  Her alopecia is slowly resolving.  She has some tightness in the right axilla with scarring/firmness.  She has not noted any swelling of the upper extremity or breast (but recognizes right sided lymphedema once pointed out)    Met with heme-oncMD Jarvis with plans to begin anastrozole anytime and start abemaciclib after radiation    No tobacco use  Family history positive for breast cancer in both maternal (65) and paternal (70s) aunts    CHEMOTHERAPY HISTORY: Concurrent Chemotherapy: No  Neoadjuvant dd AC + T  Adjuvant anastrozole and abemaciclib-she is currently thinking she is going to hold off on starting anastrozole but discussed she may begin during radiation    RADIATION THERAPY HISTORY: Prior Radiation: No    IMPLANTED CARDIAC DEVICE: none     PREGNANCY: N/A    Current Outpatient Medications   Medication Sig Dispense Refill    cetirizine (ZYRTEC) 10 MG tablet Take 10 mg by mouth daily      DULoxetine (CYMBALTA) 60 MG capsule Take 60 mg by mouth daily      fluticasone (FLONASE) 50 MCG/ACT nasal spray Spray 2 sprays into both nostrils daily      ipratropium  (ATROVENT) 0.03 % nasal spray USE 1 TO 2 SPRAYS IN EACH NOSTRIL THREE TIMES DAILY AS NEEDED FOR ALLERGIES      montelukast (SINGULAIR) 10 MG tablet Take 1 tablet by mouth every evening      potassium chloride ER (K-TAB/KLOR-CON) 10 MEQ CR tablet Take 1 tablet by mouth daily at 2 pm      SUMAtriptan (IMITREX STATDOSE) 6 MG/0.5ML pen injector kit Inject 6 mg Subcutaneous as needed      traZODone HCl 300 MG TABS Take 300 mg by mouth      VARUBI, 180 MG DOSE, 2 x 90 MG tablet       verapamil ER (VERELAN) 180 MG 24 hr capsule Take 180 mg by mouth daily      anastrozole (ARIMIDEX) 1 MG tablet Take 1 tablet by mouth daily at 2 pm (Patient not taking: Reported on 5/9/2024)      [START ON 5/15/2024] loperamide (IMODIUM) 2 MG capsule Start with 2 caps (4 mg), then take one cap (2 mg) after each diarrheal stool as needed. Do not use more than 8 caps (16 mg) per day. 30 capsule 0    [START ON 5/15/2024] prochlorperazine (COMPAZINE) 10 MG tablet Take 1 tablet (10 mg) by mouth every 6 hours as needed for nausea or vomiting 30 tablet 2     No past medical history on file.  Past Surgical History:   Procedure Laterality Date    IR CHEST PORT PLACEMENT > 5 YRS OF AGE  11/7/2023     Allergies   Allergen Reactions    Aripiprazole Other (See Comments)     Severe Paranoia    Iodinated Contrast Media Other (See Comments)     PN: MINAL CM1: CONTRAST- NKA Reaction :     No family history on file.  Social History     Socioeconomic History    Marital status:      Spouse name: Not on file    Number of children: Not on file    Years of education: Not on file    Highest education level: Not on file   Occupational History    Not on file   Tobacco Use    Smoking status: Not on file    Smokeless tobacco: Not on file   Substance and Sexual Activity    Alcohol use: Not on file    Drug use: Not on file    Sexual activity: Not on file   Other Topics Concern    Not on file   Social History Narrative    Not on file     Social Determinants of Health      Financial Resource Strain: Not on file   Food Insecurity: Not on file   Transportation Needs: Not on file   Physical Activity: Not on file   Stress: Not on file   Social Connections: Not on file   Interpersonal Safety: Not on file   Housing Stability: Not on file        REVIEW OF SYMPTOMS:  A full 14-point review of systems was performed. Pertinent findings are noted in the HPI.    General  Constitutional  Constitutional (WDL): Exceptions to WDL  Fatigue: Fatigue relieved by rest  EENT  Eye Disorders  Eye Disorder (WDL): All eye disorder elements are within defined limits (wears glasses)  Ear Disorders  Ear Disorder (WDL): All ear disorder elements are within defined limits  Respiratory  Respiratory  Respiratory (WDL): All respiratory elements are within defined limits  Cardiovascular  Cardiovascular  Cardiovascular (WDL): Exceptions to WDL (no pacemaker, port-a-cath in place on left chest)  Gastrointestinal  Gastrointestinal  Gastrointestinal (WDL): Exceptions to WDL  Anorexia: Loss of appetite without alteration in eating habits  Dysgeusia: Altered taste but no change in diet  Constipation: Occasional or intermittent symptoms OR occasional use of stool softeners, laxatives, dietary modification, or enema  Musculoskeletal  Musculoskeletal and Connective Tissue Disorders  Musculoskeletal & Connective (WDL): All musculoskeletal & connective elements are within defined limits  Integumentary  Integumentary  Integumentary (WDL): Exceptions to WDL (Right breast sugery 8/2023, darkening of skin since chemotherapy- last chemo 4/23/2024)  Alopecia: Hair loss of less than 50% of normal for that individual that is not obvious from a distance but only on close inspection OR a different hair style may be required to cover the hair loss but it does not require a wig or hair piece to camouflage  Neurological  Neurosensory  Neurosensory (WDL): Exceptions to WDL  Peripheral Motor Neuropathy: Asymptomatic OR clinical or  diagnostic observations only (hands & feet R>L)  Ataxia: Asymptomatic OR clinical or diagnostic observations only OR intervention not indicated  Peripheral Sensory Neuropathy: Asymptomatic (hands & feet R>L)  Dizziness: Mild unsteadiness or sensation of movement (occasionally)  Genitourinary/Reproductive  Genitourinary  Genitourinary (WDL): Exceptions to WDL  Urinary Frequency: Present (nocturia x2)  Lymphatic  Lymph System Disorders  Lymph (WDL): All lymph elements are within defined limits  Pain  Pain Score: No Pain (0)  AUA Assessment                                                              Accompanied by  Accompanied By: spouse ()    ECOG Status: 1 - Can't do physically strenuous work, but fully ambyulatory and can do light sedentary work    KPS Score: 90% Can perform normal activity, minor signs of disease    Pain Management Plan: N/A    Recent Labs: No results found for this or any previous visit (from the past 168 hour(s)).    Imaging: Imaging results 6 weeks:No results found.    Pathology:   No results found for this or any previous visit (from the past 8760 hour(s)).              Objective:          PHYSICAL EXAMINATION:    /59 (BP Location: Right arm, Patient Position: Sitting, Cuff Size: Adult Regular)   Pulse 89   Temp 98  F (36.7  C) (Oral)   Resp 16   Wt 70.4 kg (155 lb 4.8 oz)   SpO2 98%   BMI 27.51 kg/m      Gen: Alert, in NAD pleasant interactive, well nourished  Eyes: EOMI, sclera anicteric  HENT     Head: NC/AT, alopecia resolving  Pulm:  No wheezing, stridor or respiratory distress  Chest: Bilateral good breast symmetry.   Well-healed surgical scar of the right breast and axilla consistent with a recent history of surgery.  No surrounding erythema, warmth or drainage.  Axillary firmness and fullness without discrete axillary nor supraclavicular LAD.    Right upper extremity lymphedema.  Musculoskeletal: Normal muscle bulk and tone  Skin: Normal tone and turgor, warm, dry,  intact  Neurologic: A/Ox3, face symmetric, speech fluent, no focal motor deficits, gait normal and unaided  Psychiatric: Appropriate mood and affect       Intent of Therapy: Curative  Patient on concurrent Herceptin No  Adjuvant hormonal therapy: Yes Agent: Anastrozole (arimidex)  Start: Following radiation  Chemotherapy: Adjuvant ddAC+ T completed  Intended fractionation schedule 4256 cGy in 16 fractions +1000 cGy in 4 fraction boost    Breast cancer risk factors:   No obstetric history on file.  LMP Dates from Last 4 Encounters:   No data found for LMP      Side effects that may occur during or within weeks after radiation therapy    Fatigue and general weakness  Darkening, irritation, itchiness, redness, dryness, erythema, peeling, scabbing, ulceration and contraction of the skin of the breast and chest  Swelling of the breast  Loss of armpit hair  Lung irritation  Decrease in appetite    Side effects that may occur months or years after radiation therapy    Development of another tumor or cancer  Thickening, telangiectasias (development of spider like blood vessels in the skin) and ulceration of the skin of the breast and chest  Firming, fibrosis (scar tissue), fat necrosis, and distortion of the breast  Poor healing after a trauma or surgery in the irradiated area  Nerve damage resulting in loss of arm strength and sensation  Coronary artery blockage causing angina pain or a heart attack  Lung inflammation of fibrosis causing cough, fever and shortness of breath  Fracture of the ribs  Swellingof an arm and hand    The risks, benefits and alternatives to radiation therapy were outlined with the patient. All questions were answered and a consent was signed.     Impression   63-year-old woman with right breast invasive ductal and lobular carcinoma, stage IIIA (pT1c, pN2a cM0)  HR+; HER2- s/p right lumpectomy and sentinel lymph node biopsy 8/31/23 with negative margins and 4 of 4 lymph nodes positive followed by  axillary dissection with an additional 1 of 20 lymph nodes positive. She completed adjuvant chemotherapy ddAC+T in [place].    Right upper extremity lymphedema    Visit Dx:  (C50.411,  Z17.0) Malignant neoplasm of upper-outer quadrant of right breast in female, estrogen receptor positive (H)  (primary encounter diagnosis)  Plan: Lymphedema Therapy  Referral       Cancer Staging   Malignant neoplasm of upper-outer quadrant of right breast in female, estrogen receptor positive (H)  Staging form: Breast, AJCC 8th Edition  - Pathologic stage from 8/31/2023: Stage IB (pT1c, pN2a, cM0, G2, ER+, UT+, HER2-) - Signed by Tony Jarvis MD on 5/9/2024      Assessment & Plan:   Discussed adjuvant radiation therapy with patient and her .    The indications for, process of, alternatives, potential side effects and complications of RT to the right breast and regional lymph nodes were discussed.    They asked multiple questions which were answered to their satisfaction and they verbalized understanding.    They wish to proceed with adjuvant radiation therapy and consent is signed today.  They will return to clinic on next week for CT simulation for RT planning.     Anticipate 4256 cGy in 16 fractions +1000 cGy in 4 fraction boost.    She has already developed lymphedema of the right upper extremity so referral to lymphedema therapy placed.  Also has some scarring and fibrosis in the axilla she will work on exercises at home as well.     Thank you for allowing me to participate in the care of this patient. Feel free to contact me with all questions or concerns.      Total time of this visit, including time spent face-to-face with patient and or via video/audio, and also in preparing for today's visit for MDM and documentation. Medical decision-making included consideration and possible diagnoses, management options, complex record review, review of diagnostic tests and information, consideration and discussion of  significant complications based on comorbidities, discussion with providers involved in the care of the patient.     90 Minutes spent.      Sincerely,      Sherri Jacob MD  Department of Radiation Oncology   [facility] Radiation Oncology  Tel: [phone]  Page: [phone]    [facility]  [address] [place] [address]     [address] [place] [address]    CC:  Patient Care Team:  Leonie Manning MD as PCP - General (Family Medicine)  Tony Jarvis MD as MD (Hematology)  Sherri Jacob MD as MD (Radiation Oncology)

## 2024-05-09 NOTE — PROGRESS NOTES
[facility] Hematology and Oncology Consult Note    Patient: Lizbet Nunez  MRN: [id number]  Date of Service: May 9, 2024       Reason for Visit    Chief Complaint   Patient presents with    Oncology Clinic Visit     Malignant neoplasm of right breast in female, estrogen receptor positive, unspecified site of breast         Assessment/Plan    ECOG Performance 0 - Independent    #.  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  ER positive, NY positive, HER2 negative (0 by IHC)     I reviewed her extensive medical history and available records.  Requested most recent clinic visit notes from [facility] [place].  At this point, she completed surgery and adjuvant chemotherapy.  She will be meeting with Dr. Jacob this evening to discuss about adjuvant radiation therapy.   Today, I discussed about adjuvant endocrine therapy and CDK 4/6 inhibitor.  I advised that she can start anastrozole anytime.  Total duration of endocrine therapy is over 10 years.  She is okay to hold anastrozole during radiation therapy if advised by Dr. Jacob.   I discussed about adjuvant abemaciclib.  She clearly has indication for adjuvant CDK 4/6 inhibitor based on presence of 4 positive lymph nodes.  I reviewed the side effects and schedule.  I will plan to start after completion of radiation therapy.  I gave her handout to review.  She consented me to obtain prior authorization.   Follow-up with me about a week after completion of radiation.    #.  Small pericardial effusion of unknown clinical significance   Recent CT scan from a couple weeks ago showed small pericardial effusion.  She does not have any symptoms.  I recommended to continue to monitor clinically at this point.  We might need to follow-up echocardiogram with symptoms.    #.  Bone health  -She takes multivitamin a day.  Provided daily requirement amount of calcium and vitamin  GT.  -Advised her to obtain DEXA scan for baseline.  She never had one before.  -Discussed about weightbearing exercises.    The longitudinal plan of care for the diagnosis(es)/condition(s) as documented were addressed during this visit. Due to the added complexity in care, I will continue to support Lizbet in the subsequent management and with ongoing continuity of care.    Encounter Diagnoses:    Problem List Items Addressed This Visit          Oncology Diagnoses    Malignant neoplasm of upper-outer quadrant of right breast in female, estrogen receptor positive (H)    Relevant Medications    prochlorperazine (COMPAZINE) 10 MG tablet (Start on 5/15/2024)    loperamide (IMODIUM) 2 MG capsule (Start on 5/15/2024)    Other Relevant Orders    CBC with platelets differential    Comprehensive metabolic panel       Other    Long term current use of aromatase inhibitor     Other Visit Diagnoses       Menopause    -  Primary    Relevant Orders    DX Bone Density            ______________________________________________________________________________    Staging History   Cancer Staging   Malignant neoplasm of upper-outer quadrant of right breast in female, estrogen receptor positive (H)  Staging form: Breast, AJCC 8th Edition  - Pathologic stage from 8/31/2023: Stage IB (pT1c, pN2a, cM0, G2, ER+, VA+, HER2-) - Signed by Tony Jarvis MD on 5/9/2024        History    Ms. Lizbet Nunez is a very pleasant 63 year old female presented today accompanied byNestor for history of right breast cancer management.    7/2023-screening mammogram detected right breast cancer.  Sequent diagnostic mammogram and ultrasound demonstrated 0.8 x 0.6 x 0.6 cm hypoechoic mass 10 o'clock position in the right breast.  No suspicious lymph nodes in the right axilla.   -Core needle biopsy showed invasive lobular carcinoma with pleomorphic features, grade 2.  ER 90%, %, HER2 0 by IHC.    7/27/2023-bilateral breast MRI showed 2 x 1.2 x 1.1 cm  right breast cancer without evidence of lymphadenopathy.    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    8/31/2023-right breast lumpectomy and right axillary sentinel lymph node biopsy.   Invasive carcinoma with mixed ductal and pleomorphic lobular features, grade 2.  2 cm.  Margins were negative.   DCIS present with closest skin margin to 1 mm.   4/4 sentinel lymph nodes were positive (2 macro mets and 2 micro mets) for metastatic carcinoma with largest metastatic deposit of 5 mm.  Negative for extranodal extension.   DCIS and LCIS present.  ER 90%, NJ 80%, HER2 1+ by IHC.   pT1c N2a M0    9/26/2023-PET scan showed no evidence of distant metastasis    10/12/2023-right axillary lymph node dissection   1 of 20 lymph node showed metastatic adenocarcinoma, 4 mm in size.  No extranodal extension.    11/21/2023-4/23/2024-completed adjuvant chemotherapy with dose dense AC followed by weekly paclitaxel in [place].    4/24/2024-CT scan showed interval development of small pericardial effusion.  No pleural effusion.  She has seroma in the right axilla.  No next of malignancy.     Today, she reported that she is slowly recovering from chemotherapy side effects.  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  They are not painful.  Her energy level is slowly improving.  She does not have any unusual headaches.  No bone pain.   She was prescribed anastrozole but she has not started yet.    LMP 20 years ago.    Review of systems.  Apart from describing in history, the remainder of comprehensive ROS was negative.    Past History    No past medical history on file.  Past Surgical History:   Procedure Laterality Date    IR CHEST PORT PLACEMENT > 5 YRS OF AGE  11/7/2023     No family history on file.  Social History     Socioeconomic History    Marital status:        Allergies    Allergies   Allergen Reactions     "Aripiprazole Other (See Comments)     Severe Paranoia    Iodinated Contrast Media Other (See Comments)     PN: LW CM1: CONTRAST- NKA Reaction :       Physical Exam    /72 (BP Location: Left arm, Patient Position: Sitting, Cuff Size: Adult Regular)   Pulse 77   Resp 18   Ht 1.6 m (5' 3\")   Wt 69.9 kg (154 lb 1.6 oz)   SpO2 97%   BMI 27.30 kg/m      General: alert, awake, not in acute distress  HEENT: Head: Normal, normocephalic, atraumatic.  Eye: Normal external eye, conjunctiva, lids cornea, YUMIKO.  Nose: Normal external nose, mucus membranes and septum.  Pharynx: Normal buccal mucosa. Normal pharynx.  Neck / Thyroid: Supple, no masses, nodes, nodules or enlargement.  Lymphatics: No abnormally enlarged lymph nodes.  Chest: Normal chest wall and respirations. Clear to auscultation.  Heart: S1 S2 RRR, no murmur.   Abdomen: abdomen is soft without significant tenderness, masses, organomegaly or guarding  Extremities: normal strength, tone, and muscle mass  Skin: normal. no rash or abnormalities  CNS: non focal.    Lab Results    No results found for this or any previous visit (from the past 168 hour(s)).    Imaging Results    No results found.    60 minutes spent on the date of the encounter doing chart review, history and exam, documentation, communication of the treatment plan with the care team and further activities as noted above.    Signed by: Tony Jarvis MD   "

## 2024-05-09 NOTE — PATIENT INSTRUCTIONS
Calcium 2175-0955 mg daily in 2 divided doses.  Vitamin D 2906-2112 units daily.    You can start anastrozole anytime .   Will plan to start abemaciclib after radiation

## 2024-05-09 NOTE — PROGRESS NOTES
"Oncology Rooming Note    May 9, 2024 11:08 AM   Lizbet Nunez is a 63 year old female who presents for:    Chief Complaint   Patient presents with    Oncology Clinic Visit     Malignant neoplasm of right breast in female, estrogen receptor positive, unspecified site of breast     Initial Vitals: /72 (BP Location: Left arm, Patient Position: Sitting, Cuff Size: Adult Regular)   Pulse 77   Resp 18   Ht 1.6 m (5' 3\")   Wt 69.9 kg (154 lb 1.6 oz)   SpO2 97%   BMI 27.30 kg/m   Estimated body mass index is 27.3 kg/m  as calculated from the following:    Height as of this encounter: 1.6 m (5' 3\").    Weight as of this encounter: 69.9 kg (154 lb 1.6 oz). Body surface area is 1.76 meters squared.  No Pain (0) Comment: Data Unavailable   No LMP recorded.  Allergies reviewed: Yes  Medications reviewed: Yes    Medications: Medication refills not needed today.  Pharmacy name entered into iCurrent: [id number] [address] AT [facility]    Frailty Screening:   Is the patient here for a new oncology consult visit in cancer care? 1. Yes. Over the past month, have you experienced difficulty or required a caregiver to assist with:   1. Balance, walking or general mobility (including any falls)? NO  2. Completion of self-care tasks such as bathing, dressing, toileting, grooming/hygiene?  NO  3. Concentration or memory that affects your daily life?  NO       Clinical concerns: new patient consult.       Jessenia Solorzano MA            "

## 2024-05-09 NOTE — PROGRESS NOTES
"Considerations for radiation treatment   Pregnancy status: Female age 55+   Implanted Cardiac Devices: No, Port a cath in left chest   Any previous radiation therapy: No    Oncology Rooming Note    May 9, 2024 3:25 PM   Lizbet Nunez is a 63 year old female who presents for:    Chief Complaint   Patient presents with    Oncology Clinic Visit     Consult with Dr. Jacob     Initial Vitals: /59 (BP Location: Right arm, Patient Position: Sitting, Cuff Size: Adult Regular)   Pulse 89   Temp 98  F (36.7  C) (Oral)   Resp 16   Wt 70.4 kg (155 lb 4.8 oz)   SpO2 98%   BMI 27.51 kg/m   Estimated body mass index is 27.51 kg/m  as calculated from the following:    Height as of an earlier encounter on 5/9/24: 1.6 m (5' 3\").    Weight as of this encounter: 70.4 kg (155 lb 4.8 oz). Body surface area is 1.77 meters squared.  No Pain (0) Comment: Data Unavailable   No LMP recorded.  Allergies reviewed: Yes  Medications reviewed: Yes    Medications: Medication refills not needed today.  Pharmacy name entered into Corrigo: [facility] #[id number] [place], [place] - [address] AT [facility]    Frailty Screening:   Is the patient here for a new oncology consult visit in cancer care? 2. No      Clinical concerns: Patient here ambulatory accompanied by  for radiation consult for her breast cancer.  Patient states she completed her chemotherapy in [place] on 4/23/2024.  Patient continues to have neuropathies in her hands and feet but feels this is improving.  Patient also had a lot of darkening of her skin during the chemotherapy which she feels is improving.  Patient had alopecia but her hair is starting to grow back.  20 minutes spent in review of radiation process and potential side effects.  Written information given for review.  Seen by Dr. Jacob.  Plan return to clinic for follow-up and/or CT simulation as directed by provider.  Dr. Jacob was notified.    Radiation Therapy Patient " Education    Person involved with teaching: Patient and     Patient educational needs for self management of treatment-related side effects assessment completed.  [facility] Patient Ed tab contains Patient Learning Assessment    Education Materials Given  Managing Side Effects of Radiation Therapy: Care Instructions, Learning About External Beam Radiation Treatment, Dealing With Being Tired From Cancer Treatment: Care Instructions, Radiation Treatment For Cancer, Radiation Therapy to the Breast Guidelines, Exercising After a Breast Cancer Surgery: Care Instructions, Oncology Supportive Care Services , Welcome Letter, Insurance PA Information, and Map [facility]    Educational Topics Discussed  Side effects expected, Skin care, and When to call MD/RN    Response To Teaching  More review necessary    GYN Only  Vaginal Dilator-given and educated: N/A    Referrals sent: None    Chemotherapy?  Yes: Last chemotherapy completed 4/23/2024 in [place], patient has now established care with Dr. Jarvis medical oncology for hormonal therapy here in [place].          Valerie Stephenson RN

## 2024-05-13 ENCOUNTER — ALLIED HEALTH/NURSE VISIT (OUTPATIENT)
Dept: RADIATION ONCOLOGY | Facility: HOSPITAL | Age: 64
End: 2024-05-13
Attending: STUDENT IN AN ORGANIZED HEALTH CARE EDUCATION/TRAINING PROGRAM
Payer: COMMERCIAL

## 2024-05-13 DIAGNOSIS — Z17.0 MALIGNANT NEOPLASM OF UPPER-OUTER QUADRANT OF RIGHT BREAST IN FEMALE, ESTROGEN RECEPTOR POSITIVE (H): Primary | ICD-10-CM

## 2024-05-13 DIAGNOSIS — C50.411 MALIGNANT NEOPLASM OF UPPER-OUTER QUADRANT OF RIGHT BREAST IN FEMALE, ESTROGEN RECEPTOR POSITIVE (H): Primary | ICD-10-CM

## 2024-05-13 PROCEDURE — 77334 RADIATION TREATMENT AID(S): CPT | Mod: 26 | Performed by: STUDENT IN AN ORGANIZED HEALTH CARE EDUCATION/TRAINING PROGRAM

## 2024-05-13 PROCEDURE — 77334 RADIATION TREATMENT AID(S): CPT | Performed by: STUDENT IN AN ORGANIZED HEALTH CARE EDUCATION/TRAINING PROGRAM

## 2024-05-13 PROCEDURE — 77290 THER RAD SIMULAJ FIELD CPLX: CPT | Performed by: STUDENT IN AN ORGANIZED HEALTH CARE EDUCATION/TRAINING PROGRAM

## 2024-05-13 PROCEDURE — 77290 THER RAD SIMULAJ FIELD CPLX: CPT | Mod: 26 | Performed by: STUDENT IN AN ORGANIZED HEALTH CARE EDUCATION/TRAINING PROGRAM

## 2024-05-13 NOTE — PROGRESS NOTES
Area(s) simulated:  Right breast  For planning:  CT Scan  Custom devices to be used: Breast board, Vaculock  Patient position: Supine  Field arrangement: Obliques  Signed Informed Consent obtained.  Planned dose:   4256 cGy in 16 fractions whole breast hypofractionation +1000 cGy in 4 fraction boost.     Comments:  DIAGNOSIS: 63 year old female with a diagnosis of right breast invasive ductal and lobular carcinoma, stage IIIA (pT1c, pN2a cM0)  HR+; HER2- s/p right lumpectomy and sentinel lymph node biopsy 8/31/23 with negative margins and 4 of 4 lymph nodes positive followed by axillary dissection with an additional 1 of 20 lymph nodes positive. She completed adjuvant chemotherapy in [place]    11/21/2023 - 4/16/2024 adjuvant chemotherapy: AC Dose-dense Taxol     Simulation for an adjuvant course of radiotherapy was performed. The patient was  placed in the supine position on the CT simulator couch and positioned using a breast board.  Scar and borders wired.  Planning CT of the chest was done. The procedure was well tolerated.    CTV will include the whole breast regional LN    Complete documentation for simulation, devices, treatment planning, calculations,  in [facility]    Anticipate 4256 cGy in 16 fractions whole breast hypofractionation +1000 cGy in 4 fraction boost.

## 2024-05-14 ENCOUNTER — THERAPY VISIT (OUTPATIENT)
Dept: PHYSICAL THERAPY | Facility: REHABILITATION | Age: 64
End: 2024-05-14
Attending: STUDENT IN AN ORGANIZED HEALTH CARE EDUCATION/TRAINING PROGRAM
Payer: COMMERCIAL

## 2024-05-14 ENCOUNTER — ANCILLARY PROCEDURE (OUTPATIENT)
Dept: BONE DENSITY | Facility: CLINIC | Age: 64
End: 2024-05-14
Attending: INTERNAL MEDICINE
Payer: COMMERCIAL

## 2024-05-14 DIAGNOSIS — Z17.0 MALIGNANT NEOPLASM OF UPPER-OUTER QUADRANT OF RIGHT BREAST IN FEMALE, ESTROGEN RECEPTOR POSITIVE (H): ICD-10-CM

## 2024-05-14 DIAGNOSIS — Z78.0 MENOPAUSE: ICD-10-CM

## 2024-05-14 DIAGNOSIS — M25.619 DECREASED RANGE OF MOTION (ROM) OF SHOULDER: ICD-10-CM

## 2024-05-14 DIAGNOSIS — C50.411 MALIGNANT NEOPLASM OF UPPER-OUTER QUADRANT OF RIGHT BREAST IN FEMALE, ESTROGEN RECEPTOR POSITIVE (H): ICD-10-CM

## 2024-05-14 DIAGNOSIS — I89.0 LYMPHEDEMA: Primary | ICD-10-CM

## 2024-05-14 PROCEDURE — 77089 TBS DXA CAL W/I&R FX RISK: CPT | Mod: TC | Performed by: PHYSICIAN ASSISTANT

## 2024-05-14 PROCEDURE — 97140 MANUAL THERAPY 1/> REGIONS: CPT | Mod: GP | Performed by: PHYSICAL THERAPIST

## 2024-05-14 PROCEDURE — 77080 DXA BONE DENSITY AXIAL: CPT | Mod: TC | Performed by: PHYSICIAN ASSISTANT

## 2024-05-14 PROCEDURE — 97161 PT EVAL LOW COMPLEX 20 MIN: CPT | Mod: GP | Performed by: PHYSICAL THERAPIST

## 2024-05-14 NOTE — PROGRESS NOTES
PHYSICAL THERAPY EVALUATION  Type of Visit: Evaluation    See electronic medical record for Abuse and Falls Screening details.    She had went to get her mammogram and found to have breast cancer in right breast, she had lumpectomy done in August and then had chemotherapy done in [place] and finished there on 4/23/24 and came home on 5/5/24. They initially removed 4 LN and all were positive, went back in and removed 20 more with 1 being positive. She will start radiation on 5/24/24.     Patient noted swelling some in the right hand, and the MD noted it at the appointment on 5/9/24. She has some tenderness and more neuropathy on the right side.       Subjective       Presenting condition or subjective complaint: Neuropathy and swelling and pain  Date of onset: 05/09/24 (Date of order)    Relevant medical history: Cancer; Change in skin color; High blood pressure; Mental Illness; Migraines or headaches   Dates & types of surgery:      Prior diagnostic imaging/testing results:       Prior therapy history for the same diagnosis, illness or injury: No      Prior Level of Function  Transfers: Independent  Ambulation: Independent  ADL: Independent  IADL:     Living Environment  Social support: With a significant other or spouse   Type of home: House   Stairs to enter the home: No       Ramp:     Stairs inside the home: Yes 13 Is there a railing: Yes   Help at home: Other  Equipment owned:       Employment: No    Hobbies/Interests:      Patient goals for therapy: Range of motion better without being stiff    Pain assessment: See objective evaluation for additional pain details     Objective       EDEMA EVALUATION  Additional history:  Body part affected by edema: right arm and hand  If cancer related, treatment: chemotherapy and lumpectomy with 24 LN removed, will have radiation starting next week  If not cancer related, problems with veins or cause of swelling:    Distance able to walk: weakness from chemotherapy as well  Time  able to stand: 10-15  minutes  Sensation problems in hands/feet: Yes neuropathy in hands and feet from chemotherpy  Edema etiology: Cancer with lymph node dissection, Chemo, Radiation    FUNCTIONAL SCALES  Lower Extremity Functional Scale (score out of 80). A lower score indicates greater impairment:    Shoulder Pain and Disability Index (score out of 100).  A higher score indicates greater impairment:      Cognitive Status Examination  Orientation: Oriented to person, place and time   Level of Consciousness: Alert  Follows Commands and Answers Questions: 100% of the time  Personal Safety and Judgement: Intact  Memory: Intact    EDEMA  Skin Condition: WNL, Dryness, Intact  Scar: Yes  Capillary Refill: Symmetrical  Radial Pulse:   Dorsal Pedal Pulse:   Stemmer Sign: -  Ulceration: No    GIRTH MEASUREMENTS: Refer to separate girth measurement flowsheet.     VOLUME UE  Right UE (mL) 1715.12    Left UE (mL) 1478    UE Volume Comparison RUE volume greater than LUE volume   % Difference        RANGE OF MOTION:   (Degrees) Left AROM Right AROM  Right PROM   Shoulder Flexion 150 127    Shoulder Extension 70  55    Shoulder Abduction 128   100    Shoulder Flexion IR T6 T8 stiff and more slow to move     Elbow Extension WNL     Elbow Flexion WNL        STRENGTH: UE Strength WFL, general weakness from chemotherapy   POSTURE: Sitting Posture: Rounded shoulders, Forward head  PALPATION:   ACTIVITIES OF DAILY LIVING: WNL  BED MOBILITY: WNL  TRANSFERS: WNL  GAIT/LOCOMOTION:   BALANCE: WNL  SENSATION: UE Sensation WNL    Assessment & Plan   CLINICAL IMPRESSIONS  Medical Diagnosis: Malignant neoplasm of upper-outer quadrant of right breast in female, estrogen receptor positive, Lymphedema    Treatment Diagnosis: Malignant neoplasm of upper-outer quadrant of right breast in female, estrogen receptor positive, Lymphedema, decreased ROM of the shoulder   Impression/Assessment: Patient is a 63 year old female with complaints of right  shoulder ROM being limited, lymphedema general weakness from chemotherap.  The following significant findings have been identified: Pain, Decreased ROM/flexibility, Decreased joint mobility, Decreased strength, Decreased proprioception, Edema, Impaired gait, and Impaired muscle performance. These impairments interfere with their ability to perform self care tasks and recreational activities as compared to previous level of function.     Clinical Decision Making (Complexity):  Clinical Presentation: Stable/Uncomplicated  Clinical Presentation Rationale: based on medical and personal factors listed in PT evaluation  Clinical Decision Making (Complexity): Low complexity    PLAN OF CARE  Treatment Interventions:  Interventions: Manual Therapy, Neuromuscular Re-education, Therapeutic Activity, Therapeutic Exercise, Self-Care/Home Management, Gradient Compression Bandaging    Long Term Goals     PT Goal 1  Goal Identifier: HEP  Goal Description: Patient will be independent in a HEP and other self care/home management techniques such as compression, skin care and garments use and care of for ongoing symptom management in 12 weeks  Target Date: 08/07/24  PT Goal 2  Goal Identifier: Volumes  Goal Description: Patient will demonstrate a decreased in volumes by 5% or more for a decrease risk for infection and improvement in pain and function of the arm in 12 weeks  Target Date: 08/07/24  PT Goal 3  Goal Identifier: ROM  Goal Description: Pateint will increase her shoulder ROM  on the right side for increase ease in ADLs and self cares in 12 weeks  Target Date: 08/07/24      Frequency of Treatment: 2 times per week  Duration of Treatment: up to 12 weeks    Recommended Referrals to Other Professionals:  none  Education Assessment:   Learner/Method: Patient    Risks and benefits of evaluation/treatment have been explained.   Patient/Family/caregiver agrees with Plan of Care.     Evaluation Time:     PT Tavon, Low Complexity  Minutes (55620): 30   Present: Not applicable     Signing Clinician: Gaviota Serna, PT

## 2024-05-20 ENCOUNTER — APPOINTMENT (OUTPATIENT)
Dept: RADIATION ONCOLOGY | Facility: CLINIC | Age: 64
End: 2024-05-20
Attending: INTERNAL MEDICINE
Payer: COMMERCIAL

## 2024-05-20 PROCEDURE — 77334 RADIATION TREATMENT AID(S): CPT | Performed by: STUDENT IN AN ORGANIZED HEALTH CARE EDUCATION/TRAINING PROGRAM

## 2024-05-20 PROCEDURE — 77300 RADIATION THERAPY DOSE PLAN: CPT | Mod: 26 | Performed by: STUDENT IN AN ORGANIZED HEALTH CARE EDUCATION/TRAINING PROGRAM

## 2024-05-20 PROCEDURE — 77300 RADIATION THERAPY DOSE PLAN: CPT | Performed by: STUDENT IN AN ORGANIZED HEALTH CARE EDUCATION/TRAINING PROGRAM

## 2024-05-20 PROCEDURE — 77295 3-D RADIOTHERAPY PLAN: CPT | Performed by: STUDENT IN AN ORGANIZED HEALTH CARE EDUCATION/TRAINING PROGRAM

## 2024-05-20 PROCEDURE — 77295 3-D RADIOTHERAPY PLAN: CPT | Mod: 26 | Performed by: STUDENT IN AN ORGANIZED HEALTH CARE EDUCATION/TRAINING PROGRAM

## 2024-05-20 PROCEDURE — 77334 RADIATION TREATMENT AID(S): CPT | Mod: 26 | Performed by: STUDENT IN AN ORGANIZED HEALTH CARE EDUCATION/TRAINING PROGRAM

## 2024-05-28 ENCOUNTER — APPOINTMENT (OUTPATIENT)
Dept: RADIATION ONCOLOGY | Facility: CLINIC | Age: 64
End: 2024-05-28
Attending: STUDENT IN AN ORGANIZED HEALTH CARE EDUCATION/TRAINING PROGRAM
Payer: COMMERCIAL

## 2024-05-28 ENCOUNTER — THERAPY VISIT (OUTPATIENT)
Dept: PHYSICAL THERAPY | Facility: REHABILITATION | Age: 64
End: 2024-05-28
Attending: STUDENT IN AN ORGANIZED HEALTH CARE EDUCATION/TRAINING PROGRAM
Payer: COMMERCIAL

## 2024-05-28 DIAGNOSIS — I89.0 LYMPHEDEMA: ICD-10-CM

## 2024-05-28 DIAGNOSIS — M25.611 DECREASED RANGE OF MOTION OF RIGHT SHOULDER: ICD-10-CM

## 2024-05-28 DIAGNOSIS — C50.411 MALIGNANT NEOPLASM OF UPPER-OUTER QUADRANT OF RIGHT BREAST IN FEMALE, ESTROGEN RECEPTOR POSITIVE (H): Primary | ICD-10-CM

## 2024-05-28 DIAGNOSIS — Z17.0 MALIGNANT NEOPLASM OF UPPER-OUTER QUADRANT OF RIGHT BREAST IN FEMALE, ESTROGEN RECEPTOR POSITIVE (H): Primary | ICD-10-CM

## 2024-05-28 PROCEDURE — 77412 RADIATION TX DELIVERY LVL 3: CPT

## 2024-05-28 PROCEDURE — 97140 MANUAL THERAPY 1/> REGIONS: CPT | Mod: GP | Performed by: PHYSICAL THERAPIST

## 2024-05-28 PROCEDURE — 77280 THER RAD SIMULAJ FIELD SMPL: CPT | Mod: 26 | Performed by: RADIOLOGY

## 2024-05-28 PROCEDURE — 77280 THER RAD SIMULAJ FIELD SMPL: CPT

## 2024-05-29 ENCOUNTER — APPOINTMENT (OUTPATIENT)
Dept: RADIATION ONCOLOGY | Facility: CLINIC | Age: 64
End: 2024-05-29
Attending: STUDENT IN AN ORGANIZED HEALTH CARE EDUCATION/TRAINING PROGRAM
Payer: COMMERCIAL

## 2024-05-29 PROCEDURE — 77412 RADIATION TX DELIVERY LVL 3: CPT | Performed by: RADIOLOGY

## 2024-05-29 PROCEDURE — 77387 GUIDANCE FOR RADJ TX DLVR: CPT | Performed by: RADIOLOGY

## 2024-05-30 ENCOUNTER — APPOINTMENT (OUTPATIENT)
Dept: RADIATION ONCOLOGY | Facility: CLINIC | Age: 64
End: 2024-05-30
Attending: STUDENT IN AN ORGANIZED HEALTH CARE EDUCATION/TRAINING PROGRAM
Payer: COMMERCIAL

## 2024-05-30 PROCEDURE — 77387 GUIDANCE FOR RADJ TX DLVR: CPT | Performed by: STUDENT IN AN ORGANIZED HEALTH CARE EDUCATION/TRAINING PROGRAM

## 2024-05-30 PROCEDURE — 77412 RADIATION TX DELIVERY LVL 3: CPT | Performed by: STUDENT IN AN ORGANIZED HEALTH CARE EDUCATION/TRAINING PROGRAM

## 2024-05-31 ENCOUNTER — OFFICE VISIT (OUTPATIENT)
Dept: RADIATION ONCOLOGY | Facility: CLINIC | Age: 64
End: 2024-05-31
Attending: STUDENT IN AN ORGANIZED HEALTH CARE EDUCATION/TRAINING PROGRAM
Payer: COMMERCIAL

## 2024-05-31 VITALS
OXYGEN SATURATION: 99 % | RESPIRATION RATE: 20 BRPM | DIASTOLIC BLOOD PRESSURE: 56 MMHG | HEART RATE: 76 BPM | BODY MASS INDEX: 26.85 KG/M2 | SYSTOLIC BLOOD PRESSURE: 125 MMHG | WEIGHT: 151.6 LBS

## 2024-05-31 DIAGNOSIS — Z17.0 MALIGNANT NEOPLASM OF UPPER-OUTER QUADRANT OF RIGHT BREAST IN FEMALE, ESTROGEN RECEPTOR POSITIVE (H): Primary | ICD-10-CM

## 2024-05-31 DIAGNOSIS — C50.411 MALIGNANT NEOPLASM OF UPPER-OUTER QUADRANT OF RIGHT BREAST IN FEMALE, ESTROGEN RECEPTOR POSITIVE (H): Primary | ICD-10-CM

## 2024-05-31 PROCEDURE — 77387 GUIDANCE FOR RADJ TX DLVR: CPT | Performed by: STUDENT IN AN ORGANIZED HEALTH CARE EDUCATION/TRAINING PROGRAM

## 2024-05-31 PROCEDURE — 77412 RADIATION TX DELIVERY LVL 3: CPT | Performed by: STUDENT IN AN ORGANIZED HEALTH CARE EDUCATION/TRAINING PROGRAM

## 2024-05-31 ASSESSMENT — PAIN SCALES - GENERAL: PAINLEVEL: NO PAIN (0)

## 2024-05-31 NOTE — PROGRESS NOTES
RADIATION ONCOLOGY WEEKLY TREATMENT VISIT NOTE      Assessment / Impression       Visit Dx:  (C50.411,  Z17.0) Malignant neoplasm of upper-outer quadrant of right breast in female, estrogen receptor positive (H)  (primary encounter diagnosis)       Cancer Staging   Malignant neoplasm of upper-outer quadrant of right breast in female, estrogen receptor positive (H)  Staging form: Breast, AJCC 8th Edition  - Pathologic stage from 8/31/2023: Stage IB (pT1c, pN2a, cM0, G2, ER+, [place]+, HER2-) - Signed by Tony Jarvis MD on 5/9/2024       Tolerating radiation therapy well.  All questions and concerns addressed.  Lymphedema-has met with lymphedema therapy with follow-up plans  Grade 1 fatigue  Plan:     Continue radiation treatment as prescribed.  Radiation:   Site: Right breast /LN  Stereotactic Radiosurgery: No  Today's Dose: 1064  Total Dose for Breast: 5256  Today's Fraction/Total Fraction Breast: 4/20    Discussed skin care  Pain Management Plan: N/A    Subjective:      HPI: Lizbet Nunez is a 63 year old female with  Malignant neoplasm of upper-outer quadrant of right breast in female, estrogen receptor positive (H) [C50.411, Z17.0]    Start of radiation therapy has gone well.  No skin irritation.  No change in swelling.  Has met with lymphedema therapy regarding right upper extremity lymphedema with follow-up plans.  Feels fatigued, taking nap as needed.    The following portions of the patient's history were reviewed and updated as appropriate: allergies, current medications, past family history, past medical history, past social history, past surgical history and problem list.    Assessment                  Body Site:  Breast                           Site: Right breast /LN  Stereotactic Radiosurgery: No  Today's Dose: 1064  Total Dose for Breast: 5256  Today's Fraction/Total Fraction Breast: 4/20                                   Sexuality Alteration                    Emotional Alteration     Copin: Effective  Comfort Alteration   KPS: 90% Can perform normal activity, minor signs of disease  Fatigue (ONS scale): 2: Mild Fatigue  Pain Location: denies   Nutrition Alteration   Anorexia: 0: None  Nausea: 0: None  Vomitin: None  Weight: 68.8 kg (151 lb 9.6 oz)  Skin Alteration   Skin Sensation: 0: No problem  Skin Reaction: 0: None (radioplex given with written/verbal instruction)  AUA Assessment                                           Accompanied by       Objective:     Exam:     Vitals:    24 1133   BP: 125/56   Pulse: 76   Resp: 20   SpO2: 99%   Weight: 68.8 kg (151 lb 9.6 oz)   PainSc: No Pain (0)       Wt Readings from Last 8 Encounters:   24 68.8 kg (151 lb 9.6 oz)   24 70.4 kg (155 lb 4.8 oz)   24 69.9 kg (154 lb 1.6 oz)       General: Alert and oriented, in no acute distress  Lizbet has no Erythema.  Lymphedema right upper extremity    Treatment Summary to Date    Aria chart and setup information reviewed    Sherir Jacob MD

## 2024-05-31 NOTE — LETTER
5/31/2024         RE: Lizbet Nunez  [address] [place] [address]        Dear Colleague,    Thank you for referring your patient, Lizbet Nunez, to the [facility] RADIATION ONCOLOGY [place]. Please see a copy of my visit note below.    RADIATION ONCOLOGY WEEKLY TREATMENT VISIT NOTE      Assessment / Impression       Visit Dx:  (C50.411,  Z17.0) Malignant neoplasm of upper-outer quadrant of right breast in female, estrogen receptor positive (H)  (primary encounter diagnosis)       Cancer Staging   Malignant neoplasm of upper-outer quadrant of right breast in female, estrogen receptor positive (H)  Staging form: Breast, AJCC 8th Edition  - Pathologic stage from 8/31/2023: Stage IB (pT1c, pN2a, cM0, G2, ER+, [place]+, HER2-) - Signed by Tony Jarvis MD on 5/9/2024       Tolerating radiation therapy well.  All questions and concerns addressed.  Lymphedema-has met with lymphedema therapy with follow-up plans  Grade 1 fatigue  Plan:     Continue radiation treatment as prescribed.  Radiation:   Site: Right breast /LN  Stereotactic Radiosurgery: No  Today's Dose: 1064  Total Dose for Breast: 5256  Today's Fraction/Total Fraction Breast: 4/20    Discussed skin care  Pain Management Plan: N/A    Subjective:      HPI: Lizbet Nunez is a 63 year old female with  Malignant neoplasm of upper-outer quadrant of right breast in female, estrogen receptor positive (H) [C50.411, Z17.0]    Start of radiation therapy has gone well.  No skin irritation.  No change in swelling.  Has met with lymphedema therapy regarding right upper extremity lymphedema with follow-up plans.  Feels fatigued, taking nap as needed.    The following portions of the patient's history were reviewed and updated as appropriate: allergies, current medications, past family history, past medical history, past social history, past surgical history and problem list.    Assessment                  Body Site:  Breast                           Site:  Right breast /LN  Stereotactic Radiosurgery: No  Today's Dose: 1064  Total Dose for Breast: 5256  Today's Fraction/Total Fraction Breast:                                    Sexuality Alteration                    Emotional Alteration    Copin: Effective  Comfort Alteration   KPS: 90% Can perform normal activity, minor signs of disease  Fatigue (ONS scale): 2: Mild Fatigue  Pain Location: denies   Nutrition Alteration   Anorexia: 0: None  Nausea: 0: None  Vomitin: None  Weight: 68.8 kg (151 lb 9.6 oz)  Skin Alteration   Skin Sensation: 0: No problem  Skin Reaction: 0: None (radioplex given with written/verbal instruction)  AUA Assessment                                           Accompanied by       Objective:     Exam:     Vitals:    24 1133   BP: 125/56   Pulse: 76   Resp: 20   SpO2: 99%   Weight: 68.8 kg (151 lb 9.6 oz)   PainSc: No Pain (0)       Wt Readings from Last 8 Encounters:   24 68.8 kg (151 lb 9.6 oz)   24 70.4 kg (155 lb 4.8 oz)   24 69.9 kg (154 lb 1.6 oz)       General: Alert and oriented, in no acute distress  Lizbet has no Erythema.  Lymphedema right upper extremity    Treatment Summary to Date    Aria chart and setup information reviewed    Sherri Jacob MD      Again, thank you for allowing me to participate in the care of your patient.        Sincerely,        Sherri Jacob MD

## 2024-06-02 ENCOUNTER — HEALTH MAINTENANCE LETTER (OUTPATIENT)
Age: 64
End: 2024-06-02

## 2024-06-03 ENCOUNTER — APPOINTMENT (OUTPATIENT)
Dept: RADIATION ONCOLOGY | Facility: CLINIC | Age: 64
End: 2024-06-03
Attending: STUDENT IN AN ORGANIZED HEALTH CARE EDUCATION/TRAINING PROGRAM
Payer: COMMERCIAL

## 2024-06-03 PROCEDURE — 77427 RADIATION TX MANAGEMENT X5: CPT | Performed by: STUDENT IN AN ORGANIZED HEALTH CARE EDUCATION/TRAINING PROGRAM

## 2024-06-03 PROCEDURE — 77412 RADIATION TX DELIVERY LVL 3: CPT | Performed by: RADIOLOGY

## 2024-06-03 PROCEDURE — 77336 RADIATION PHYSICS CONSULT: CPT | Performed by: STUDENT IN AN ORGANIZED HEALTH CARE EDUCATION/TRAINING PROGRAM

## 2024-06-03 PROCEDURE — 77387 GUIDANCE FOR RADJ TX DLVR: CPT | Performed by: RADIOLOGY

## 2024-06-04 ENCOUNTER — APPOINTMENT (OUTPATIENT)
Dept: RADIATION ONCOLOGY | Facility: CLINIC | Age: 64
End: 2024-06-04
Attending: STUDENT IN AN ORGANIZED HEALTH CARE EDUCATION/TRAINING PROGRAM
Payer: COMMERCIAL

## 2024-06-04 PROCEDURE — 77387 GUIDANCE FOR RADJ TX DLVR: CPT | Performed by: RADIOLOGY

## 2024-06-04 PROCEDURE — 77387 GUIDANCE FOR RADJ TX DLVR: CPT

## 2024-06-04 PROCEDURE — 77412 RADIATION TX DELIVERY LVL 3: CPT

## 2024-06-05 ENCOUNTER — APPOINTMENT (OUTPATIENT)
Dept: RADIATION ONCOLOGY | Facility: CLINIC | Age: 64
End: 2024-06-05
Attending: STUDENT IN AN ORGANIZED HEALTH CARE EDUCATION/TRAINING PROGRAM
Payer: COMMERCIAL

## 2024-06-05 PROCEDURE — 77412 RADIATION TX DELIVERY LVL 3: CPT | Performed by: RADIOLOGY

## 2024-06-05 PROCEDURE — 77387 GUIDANCE FOR RADJ TX DLVR: CPT | Performed by: RADIOLOGY

## 2024-06-06 ENCOUNTER — APPOINTMENT (OUTPATIENT)
Dept: RADIATION ONCOLOGY | Facility: CLINIC | Age: 64
End: 2024-06-06
Attending: STUDENT IN AN ORGANIZED HEALTH CARE EDUCATION/TRAINING PROGRAM
Payer: COMMERCIAL

## 2024-06-06 PROCEDURE — 77387 GUIDANCE FOR RADJ TX DLVR: CPT | Performed by: RADIOLOGY

## 2024-06-06 PROCEDURE — 77412 RADIATION TX DELIVERY LVL 3: CPT | Performed by: RADIOLOGY

## 2024-06-07 ENCOUNTER — APPOINTMENT (OUTPATIENT)
Dept: RADIATION ONCOLOGY | Facility: CLINIC | Age: 64
End: 2024-06-07
Attending: STUDENT IN AN ORGANIZED HEALTH CARE EDUCATION/TRAINING PROGRAM
Payer: COMMERCIAL

## 2024-06-07 VITALS
SYSTOLIC BLOOD PRESSURE: 132 MMHG | HEART RATE: 72 BPM | RESPIRATION RATE: 16 BRPM | OXYGEN SATURATION: 99 % | TEMPERATURE: 97.9 F | DIASTOLIC BLOOD PRESSURE: 70 MMHG | WEIGHT: 151.1 LBS | BODY MASS INDEX: 26.77 KG/M2

## 2024-06-07 DIAGNOSIS — C50.411 MALIGNANT NEOPLASM OF UPPER-OUTER QUADRANT OF RIGHT BREAST IN FEMALE, ESTROGEN RECEPTOR POSITIVE (H): Primary | ICD-10-CM

## 2024-06-07 DIAGNOSIS — Z17.0 MALIGNANT NEOPLASM OF UPPER-OUTER QUADRANT OF RIGHT BREAST IN FEMALE, ESTROGEN RECEPTOR POSITIVE (H): Primary | ICD-10-CM

## 2024-06-07 PROCEDURE — 77387 GUIDANCE FOR RADJ TX DLVR: CPT | Performed by: RADIOLOGY

## 2024-06-07 PROCEDURE — 77412 RADIATION TX DELIVERY LVL 3: CPT | Performed by: RADIOLOGY

## 2024-06-07 ASSESSMENT — PAIN SCALES - GENERAL: PAINLEVEL: MILD PAIN (2)

## 2024-06-07 NOTE — LETTER
2024      Lizbet Nunez  [address] [place] [address]      Dear Colleague,    Thank you for referring your patient, Lizbet Nunez, to the [facility] RADIATION ONCOLOGY [place]. Please see a copy of my visit note below.    [facility] PHYSICIANS  SPECIALIZING IN BREAKTHROUGHS  Radiation Oncology    On Treatment Visit Note      Lizbet Nunez      Date: 2024   MRN: [id number]   : 1960    DIAGNOSIS:   (C50.411, Z17.0) Malignant neoplasm of upper-outer quadrant of right breast in female, estrogen receptor positive (H) (primary encounter diagnosis)     Staging form: Breast, AJCC 8th Edition  - Pathologic stage from 2023: Stage IB (pT1c, pN2a, cM0, G2, ER+, ND+, HER2-) - Signed by Tony Jarvis MD on 2024      Patient is on adjuvant radiation post chemotherapy  Tolerating radiation therapy well.  All questions and concerns addressed.  Lymphedema-has met with lymphedema therapy with follow-up plans  Grade 1 fatigue      HPI: Lizbet Nunez is a 63 year old female with  Malignant neoplasm of upper-outer quadrant of right breast in female, estrogen receptor positive (H) [C50.411, Z17.0]     Start of radiation therapy has gone well.  No skin irritation.  No change in swelling.  Has met with lymphedema therapy regarding right upper extremity lymphedema with follow-up plans.  Feels fatigued, taking nap as needed.     The following portions of the patient's history were reviewed and updated as appropriate: allergies, current medications, past family history, past medical history, past social history, past surgical history and problem list.       Treatment Summary to Date   Rt Breast and LNs   2394/5256 cGy   Fx:      Nursing ROS: Per Epic     Objective:   /70 (BP Location: Left arm, Patient Position: Sitting, Cuff Size: Adult Regular)   Pulse 72   Temp 97.9  F (36.6  C) (Oral)   Resp 16   Wt 68.5 kg (151 lb 1.6 oz)   SpO2 99%   BMI 26.77 kg/m    Gen:  Appears well, NAD  O skin reaction    Assessment:    Tolerating radiation therapy well.  All questions and concerns addressed.      Treatment-related toxicities (CTCAE v4.0):0       Plan:   Continue current therapy.      Aria chart and setup information reviewed  Image checked       Marvin Raymond MD  Radiation Oncology               Again, thank you for allowing me to participate in the care of your patient.        Sincerely,        Marvin Leos MD

## 2024-06-07 NOTE — PROGRESS NOTES
[facility] PHYSICIANS  SPECIALIZING IN BREAKTHROUGHS  Radiation Oncology    On Treatment Visit Note      Lizbet Nunez      Date: 2024   MRN: [id number]   : 1960    DIAGNOSIS:   (C50.411, Z17.0) Malignant neoplasm of upper-outer quadrant of right breast in female, estrogen receptor positive (H) (primary encounter diagnosis)     Staging form: Breast, AJCC 8th Edition  - Pathologic stage from 2023: Stage IB (pT1c, pN2a, cM0, G2, ER+, AR+, HER2-) - Signed by Tony Jarvis MD on 2024      Patient is on adjuvant radiation post chemotherapy  Tolerating radiation therapy well.  All questions and concerns addressed.  Lymphedema-has met with lymphedema therapy with follow-up plans  Grade 1 fatigue      HPI: Lizbet Nunez is a 63 year old female with  Malignant neoplasm of upper-outer quadrant of right breast in female, estrogen receptor positive (H) [C50.411, Z17.0]     Start of radiation therapy has gone well.  No skin irritation.  No change in swelling.  Has met with lymphedema therapy regarding right upper extremity lymphedema with follow-up plans.  Feels fatigued, taking nap as needed.     The following portions of the patient's history were reviewed and updated as appropriate: allergies, current medications, past family history, past medical history, past social history, past surgical history and problem list.       Treatment Summary to Date   Rt Breast and LNs   2394/5256 cGy   Fx:      Nursing ROS: Per Epic     Objective:   /70 (BP Location: Left arm, Patient Position: Sitting, Cuff Size: Adult Regular)   Pulse 72   Temp 97.9  F (36.6  C) (Oral)   Resp 16   Wt 68.5 kg (151 lb 1.6 oz)   SpO2 99%   BMI 26.77 kg/m    Gen: Appears well, NAD  O skin reaction    Assessment:    Tolerating radiation therapy well.  All questions and concerns addressed.      Treatment-related toxicities (CTCAE v4.0):0       Plan:   Continue current therapy.      Aria chart and setup  information reviewed  Image checked       Marvin Raymond MD  Radiation Oncology

## 2024-06-10 ENCOUNTER — APPOINTMENT (OUTPATIENT)
Dept: RADIATION ONCOLOGY | Facility: CLINIC | Age: 64
End: 2024-06-10
Attending: STUDENT IN AN ORGANIZED HEALTH CARE EDUCATION/TRAINING PROGRAM
Payer: COMMERCIAL

## 2024-06-10 PROCEDURE — 77412 RADIATION TX DELIVERY LVL 3: CPT | Performed by: RADIOLOGY

## 2024-06-10 PROCEDURE — 77387 GUIDANCE FOR RADJ TX DLVR: CPT | Performed by: RADIOLOGY

## 2024-06-10 PROCEDURE — 77427 RADIATION TX MANAGEMENT X5: CPT | Performed by: RADIOLOGY

## 2024-06-10 PROCEDURE — 77336 RADIATION PHYSICS CONSULT: CPT | Performed by: STUDENT IN AN ORGANIZED HEALTH CARE EDUCATION/TRAINING PROGRAM

## 2024-06-11 ENCOUNTER — APPOINTMENT (OUTPATIENT)
Dept: RADIATION ONCOLOGY | Facility: CLINIC | Age: 64
End: 2024-06-11
Attending: STUDENT IN AN ORGANIZED HEALTH CARE EDUCATION/TRAINING PROGRAM
Payer: COMMERCIAL

## 2024-06-11 PROCEDURE — 77387 GUIDANCE FOR RADJ TX DLVR: CPT | Performed by: RADIOLOGY

## 2024-06-11 PROCEDURE — 77412 RADIATION TX DELIVERY LVL 3: CPT | Performed by: RADIOLOGY

## 2024-06-12 ENCOUNTER — APPOINTMENT (OUTPATIENT)
Dept: RADIATION ONCOLOGY | Facility: CLINIC | Age: 64
End: 2024-06-12
Attending: STUDENT IN AN ORGANIZED HEALTH CARE EDUCATION/TRAINING PROGRAM
Payer: COMMERCIAL

## 2024-06-12 PROCEDURE — 77334 RADIATION TREATMENT AID(S): CPT | Performed by: STUDENT IN AN ORGANIZED HEALTH CARE EDUCATION/TRAINING PROGRAM

## 2024-06-12 PROCEDURE — 77412 RADIATION TX DELIVERY LVL 3: CPT | Performed by: RADIOLOGY

## 2024-06-12 PROCEDURE — 77307 TELETHX ISODOSE PLAN CPLX: CPT | Performed by: STUDENT IN AN ORGANIZED HEALTH CARE EDUCATION/TRAINING PROGRAM

## 2024-06-12 PROCEDURE — 77300 RADIATION THERAPY DOSE PLAN: CPT | Performed by: STUDENT IN AN ORGANIZED HEALTH CARE EDUCATION/TRAINING PROGRAM

## 2024-06-12 PROCEDURE — 77334 RADIATION TREATMENT AID(S): CPT | Mod: 26 | Performed by: STUDENT IN AN ORGANIZED HEALTH CARE EDUCATION/TRAINING PROGRAM

## 2024-06-12 PROCEDURE — 77387 GUIDANCE FOR RADJ TX DLVR: CPT | Performed by: RADIOLOGY

## 2024-06-12 PROCEDURE — 77307 TELETHX ISODOSE PLAN CPLX: CPT | Mod: 26 | Performed by: STUDENT IN AN ORGANIZED HEALTH CARE EDUCATION/TRAINING PROGRAM

## 2024-06-13 ENCOUNTER — APPOINTMENT (OUTPATIENT)
Dept: RADIATION ONCOLOGY | Facility: CLINIC | Age: 64
End: 2024-06-13
Attending: STUDENT IN AN ORGANIZED HEALTH CARE EDUCATION/TRAINING PROGRAM
Payer: COMMERCIAL

## 2024-06-13 PROCEDURE — 77387 GUIDANCE FOR RADJ TX DLVR: CPT | Performed by: STUDENT IN AN ORGANIZED HEALTH CARE EDUCATION/TRAINING PROGRAM

## 2024-06-13 PROCEDURE — 77412 RADIATION TX DELIVERY LVL 3: CPT | Performed by: STUDENT IN AN ORGANIZED HEALTH CARE EDUCATION/TRAINING PROGRAM

## 2024-06-14 ENCOUNTER — APPOINTMENT (OUTPATIENT)
Dept: RADIATION ONCOLOGY | Facility: CLINIC | Age: 64
End: 2024-06-14
Attending: STUDENT IN AN ORGANIZED HEALTH CARE EDUCATION/TRAINING PROGRAM
Payer: COMMERCIAL

## 2024-06-14 VITALS
RESPIRATION RATE: 18 BRPM | OXYGEN SATURATION: 98 % | BODY MASS INDEX: 27.01 KG/M2 | DIASTOLIC BLOOD PRESSURE: 57 MMHG | SYSTOLIC BLOOD PRESSURE: 111 MMHG | TEMPERATURE: 98 F | WEIGHT: 152.5 LBS | HEART RATE: 75 BPM

## 2024-06-14 DIAGNOSIS — Z17.0 MALIGNANT NEOPLASM OF UPPER-OUTER QUADRANT OF RIGHT BREAST IN FEMALE, ESTROGEN RECEPTOR POSITIVE (H): Primary | ICD-10-CM

## 2024-06-14 DIAGNOSIS — C50.411 MALIGNANT NEOPLASM OF UPPER-OUTER QUADRANT OF RIGHT BREAST IN FEMALE, ESTROGEN RECEPTOR POSITIVE (H): Primary | ICD-10-CM

## 2024-06-14 PROCEDURE — 77412 RADIATION TX DELIVERY LVL 3: CPT | Performed by: STUDENT IN AN ORGANIZED HEALTH CARE EDUCATION/TRAINING PROGRAM

## 2024-06-14 PROCEDURE — 77387 GUIDANCE FOR RADJ TX DLVR: CPT | Performed by: STUDENT IN AN ORGANIZED HEALTH CARE EDUCATION/TRAINING PROGRAM

## 2024-06-14 ASSESSMENT — PAIN SCALES - GENERAL: PAINLEVEL: NO PAIN (0)

## 2024-06-14 NOTE — LETTER
6/14/2024      Lizbet Nunez  [address] [place] [address]      Dear Colleague,    Thank you for referring your patient, Lizbet Nunez, to the [facility] RADIATION ONCOLOGY [place]. Please see a copy of my visit note below.    RADIATION ONCOLOGY WEEKLY TREATMENT VISIT NOTE      Assessment / Impression       Visit Dx:  (C50.411,  Z17.0) Malignant neoplasm of upper-outer quadrant of right breast in female, estrogen receptor positive (H)  (primary encounter diagnosis)       Cancer Staging   Malignant neoplasm of upper-outer quadrant of right breast in female, estrogen receptor positive (H)  Staging form: Breast, AJCC 8th Edition  - Pathologic stage from 8/31/2023: Stage IB (pT1c, pN2a, cM0, G2, ER+, [place]+, HER2-) - Signed by Tony Jarvis MD on 5/9/2024       Tolerating radiation therapy well.  All questions and concerns addressed.  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  Radiation:   Site: Rt breast & LN  Stereotactic Radiosurgery: No  Concurrent Therapy: No  Today's Dose: 3724  Total Dose for Breast: 5256  Today's Fraction/Total Fraction Breast: 14/20    Continue current skin care  Follow-up with lymphedema therapy  Pain Management Plan: N/A    Subjective:      HPI: Lizbet Nunez is a 63 year old female with  Malignant neoplasm of upper-outer quadrant of right breast in female, estrogen receptor positive (H) [C50.411, Z17.0]    He is tolerating radiation therapy well with increasing fatigue and skin irritation.  No significant skin tenderness redness but tanning most notable in the axilla.  No peeling.  No swelling of the breast.  Right upper extremity lymphedema swelling stable-has met with lymphedema therapy    The following portions of the patient's history were reviewed and updated as appropriate: allergies, current medications, past family history, past medical history, past social history, past surgical  history and problem list.    Assessment                  Body Site:  Breast                           Site: Rt breast & LN  Stereotactic Radiosurgery: No  Concurrent Therapy: No  Today's Dose: 3724  Total Dose for Breast: 5256  Today's Fraction/Total Fraction Breast:   Drainage: 0: Absent                                   Sexuality Alteration                    Emotional Alteration    Copin: Effective  Comfort Alteration   KPS: 90% Can perform normal activity, minor signs of disease  Fatigue (ONS scale): 6: Moderate Fatigue  Pain Location: denies  Pain Intensity. Rate degree of pain ranging from 0 (no pain) to 10 (severe pain): 0   Nutrition Alteration   Anorexia: 0: None  Nausea: 0: None  Vomitin: None  Weight: 69.2 kg (152 lb 8 oz)  Skin Alteration   Skin Sensation: 0: No problem  Skin Reaction: 1: Faint erythema or dry desquamation (Dark spot under Right axilla)  AUA Assessment                                           Accompanied by       Objective:     Exam:     Vitals:    24 1133   BP: 111/57   Pulse: 75   Resp: 18   Temp: 98  F (36.7  C)   TempSrc: Oral   SpO2: 98%   Weight: 69.2 kg (152 lb 8 oz)   PainSc: No Pain (0)       Wt Readings from Last 8 Encounters:   24 69.2 kg (152 lb 8 oz)   24 68.5 kg (151 lb 1.6 oz)   24 68.8 kg (151 lb 9.6 oz)   24 70.4 kg (155 lb 4.8 oz)   24 69.9 kg (154 lb 1.6 oz)       General: Alert and oriented, in no acute distress  Lizbet has no, mild Erythema with maximal tanning in the axilla.    Treatment Summary to Date    Aria chart and setup information reviewed    Sherri Jacob MD      Again, thank you for allowing me to participate in the care of your patient.        Sincerely,        Sherri Jacob MD

## 2024-06-14 NOTE — PROGRESS NOTES
RADIATION ONCOLOGY WEEKLY TREATMENT VISIT NOTE      Assessment / Impression       Visit Dx:  (C50.411,  Z17.0) Malignant neoplasm of upper-outer quadrant of right breast in female, estrogen receptor positive (H)  (primary encounter diagnosis)       Cancer Staging   Malignant neoplasm of upper-outer quadrant of right breast in female, estrogen receptor positive (H)  Staging form: Breast, AJCC 8th Edition  - Pathologic stage from 8/31/2023: Stage IB (pT1c, pN2a, cM0, G2, ER+, [place]+, HER2-) - Signed by Tony Jarvis MD on 5/9/2024       Tolerating radiation therapy well.  All questions and concerns addressed.  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  Radiation:   Site: Rt breast & LN  Stereotactic Radiosurgery: No  Concurrent Therapy: No  Today's Dose: 3724  Total Dose for Breast: 5256  Today's Fraction/Total Fraction Breast: 14/20    Continue current skin care  Follow-up with lymphedema therapy  Pain Management Plan: N/A    Subjective:      HPI: Lizbet Nunez is a 63 year old female with  Malignant neoplasm of upper-outer quadrant of right breast in female, estrogen receptor positive (H) [C50.411, Z17.0]    He is tolerating radiation therapy well with increasing fatigue and skin irritation.  No significant skin tenderness redness but tanning most notable in the axilla.  No peeling.  No swelling of the breast.  Right upper extremity lymphedema swelling stable-has met with lymphedema therapy    The following portions of the patient's history were reviewed and updated as appropriate: allergies, current medications, past family history, past medical history, past social history, past surgical history and problem list.    Assessment                  Body Site:  Breast                           Site: Rt breast & LN  Stereotactic Radiosurgery: No  Concurrent Therapy: No  Today's Dose: 3724  Total Dose for Breast: 5256  Today's  Fraction/Total Fraction Breast:   Drainage: 0: Absent                                   Sexuality Alteration                    Emotional Alteration    Copin: Effective  Comfort Alteration   KPS: 90% Can perform normal activity, minor signs of disease  Fatigue (ONS scale): 6: Moderate Fatigue  Pain Location: denies  Pain Intensity. Rate degree of pain ranging from 0 (no pain) to 10 (severe pain): 0   Nutrition Alteration   Anorexia: 0: None  Nausea: 0: None  Vomitin: None  Weight: 69.2 kg (152 lb 8 oz)  Skin Alteration   Skin Sensation: 0: No problem  Skin Reaction: 1: Faint erythema or dry desquamation (Dark spot under Right axilla)  AUA Assessment                                           Accompanied by       Objective:     Exam:     Vitals:    24 1133   BP: 111/57   Pulse: 75   Resp: 18   Temp: 98  F (36.7  C)   TempSrc: Oral   SpO2: 98%   Weight: 69.2 kg (152 lb 8 oz)   PainSc: No Pain (0)       Wt Readings from Last 8 Encounters:   24 69.2 kg (152 lb 8 oz)   24 68.5 kg (151 lb 1.6 oz)   24 68.8 kg (151 lb 9.6 oz)   24 70.4 kg (155 lb 4.8 oz)   24 69.9 kg (154 lb 1.6 oz)       General: Alert and oriented, in no acute distress  Lizbet has no, mild Erythema with maximal tanning in the axilla.    Treatment Summary to Date    Aria chart and setup information reviewed    Sherri Jacob MD

## 2024-06-17 ENCOUNTER — APPOINTMENT (OUTPATIENT)
Dept: RADIATION ONCOLOGY | Facility: CLINIC | Age: 64
End: 2024-06-17
Attending: STUDENT IN AN ORGANIZED HEALTH CARE EDUCATION/TRAINING PROGRAM
Payer: COMMERCIAL

## 2024-06-17 PROCEDURE — 77427 RADIATION TX MANAGEMENT X5: CPT | Performed by: STUDENT IN AN ORGANIZED HEALTH CARE EDUCATION/TRAINING PROGRAM

## 2024-06-17 PROCEDURE — 77336 RADIATION PHYSICS CONSULT: CPT | Performed by: STUDENT IN AN ORGANIZED HEALTH CARE EDUCATION/TRAINING PROGRAM

## 2024-06-17 PROCEDURE — 77387 GUIDANCE FOR RADJ TX DLVR: CPT | Performed by: RADIOLOGY

## 2024-06-17 PROCEDURE — 77412 RADIATION TX DELIVERY LVL 3: CPT | Performed by: RADIOLOGY

## 2024-06-18 ENCOUNTER — APPOINTMENT (OUTPATIENT)
Dept: RADIATION ONCOLOGY | Facility: CLINIC | Age: 64
End: 2024-06-18
Attending: STUDENT IN AN ORGANIZED HEALTH CARE EDUCATION/TRAINING PROGRAM
Payer: COMMERCIAL

## 2024-06-18 PROCEDURE — 77387 GUIDANCE FOR RADJ TX DLVR: CPT | Performed by: RADIOLOGY

## 2024-06-18 PROCEDURE — 77412 RADIATION TX DELIVERY LVL 3: CPT | Performed by: RADIOLOGY

## 2024-06-19 ENCOUNTER — APPOINTMENT (OUTPATIENT)
Dept: RADIATION ONCOLOGY | Facility: CLINIC | Age: 64
End: 2024-06-19
Attending: STUDENT IN AN ORGANIZED HEALTH CARE EDUCATION/TRAINING PROGRAM
Payer: COMMERCIAL

## 2024-06-19 PROCEDURE — 77412 RADIATION TX DELIVERY LVL 3: CPT | Performed by: RADIOLOGY

## 2024-06-19 PROCEDURE — 77280 THER RAD SIMULAJ FIELD SMPL: CPT | Mod: 26 | Performed by: RADIOLOGY

## 2024-06-19 PROCEDURE — 77280 THER RAD SIMULAJ FIELD SMPL: CPT | Performed by: RADIOLOGY

## 2024-06-20 ENCOUNTER — APPOINTMENT (OUTPATIENT)
Dept: RADIATION ONCOLOGY | Facility: CLINIC | Age: 64
End: 2024-06-20
Attending: STUDENT IN AN ORGANIZED HEALTH CARE EDUCATION/TRAINING PROGRAM
Payer: COMMERCIAL

## 2024-06-20 PROCEDURE — 77387 GUIDANCE FOR RADJ TX DLVR: CPT | Performed by: RADIOLOGY

## 2024-06-20 PROCEDURE — 77412 RADIATION TX DELIVERY LVL 3: CPT

## 2024-06-20 PROCEDURE — 77387 GUIDANCE FOR RADJ TX DLVR: CPT

## 2024-06-21 ENCOUNTER — APPOINTMENT (OUTPATIENT)
Dept: RADIATION ONCOLOGY | Facility: CLINIC | Age: 64
End: 2024-06-21
Attending: STUDENT IN AN ORGANIZED HEALTH CARE EDUCATION/TRAINING PROGRAM
Payer: COMMERCIAL

## 2024-06-21 VITALS
DIASTOLIC BLOOD PRESSURE: 53 MMHG | BODY MASS INDEX: 26.39 KG/M2 | WEIGHT: 149 LBS | HEART RATE: 82 BPM | OXYGEN SATURATION: 98 % | SYSTOLIC BLOOD PRESSURE: 112 MMHG | TEMPERATURE: 98.1 F | RESPIRATION RATE: 20 BRPM

## 2024-06-21 DIAGNOSIS — Z17.0 MALIGNANT NEOPLASM OF UPPER-OUTER QUADRANT OF RIGHT BREAST IN FEMALE, ESTROGEN RECEPTOR POSITIVE (H): Primary | ICD-10-CM

## 2024-06-21 DIAGNOSIS — C50.411 MALIGNANT NEOPLASM OF UPPER-OUTER QUADRANT OF RIGHT BREAST IN FEMALE, ESTROGEN RECEPTOR POSITIVE (H): Primary | ICD-10-CM

## 2024-06-21 PROCEDURE — 77387 GUIDANCE FOR RADJ TX DLVR: CPT | Performed by: STUDENT IN AN ORGANIZED HEALTH CARE EDUCATION/TRAINING PROGRAM

## 2024-06-21 PROCEDURE — 77412 RADIATION TX DELIVERY LVL 3: CPT | Performed by: STUDENT IN AN ORGANIZED HEALTH CARE EDUCATION/TRAINING PROGRAM

## 2024-06-21 ASSESSMENT — PAIN SCALES - GENERAL: PAINLEVEL: NO PAIN (0)

## 2024-06-21 NOTE — PROGRESS NOTES
RADIATION ONCOLOGY WEEKLY TREATMENT VISIT NOTE      Assessment / Impression       Visit Dx:  (C50.411,  Z17.0) Malignant neoplasm of upper-outer quadrant of right breast in female, estrogen receptor positive (H)  (primary encounter diagnosis)       Cancer Staging   Malignant neoplasm of upper-outer quadrant of right breast in female, estrogen receptor positive (H)  Staging form: Breast, AJCC 8th Edition  - Pathologic stage from 8/31/2023: Stage IB (pT1c, pN2a, cM0, G2, ER+, [place]+, HER2-) - Signed by Tony Jarvis MD on 5/9/2024       Tolerating radiation therapy well.  All questions and concerns addressed.  Lymphedema stable  Grade 2 radiation dermatitis    Plan:     Continue radiation treatment as prescribed.  Radiation:   Site: Rt breast/LN  Stereotactic Radiosurgery: No  Concurrent Therapy: No  Today's Dose: 5006  Total Dose for Breast: 5256  Today's Fraction/Total Fraction Breast: 19/20  Continue current skin care  Follow-up with lymphedema therapy    Pain Management Plan: N/A    Subjective:      HPI: Lizbet Nunez is a 63 year old female with  Malignant neoplasm of upper-outer quadrant of right breast in female, estrogen receptor positive (H) [C50.411, Z17.0]  She is tolerating radiation therapy well with increasing skin irritation and darkening most notable in the axilla.  No worsening swelling of upper extremity.  No breast swelling.  No pruritus.  Some fatigue.    The following portions of the patient's history were reviewed and updated as appropriate: allergies, current medications, past family history, past medical history, past social history, past surgical history and problem list.    Assessment                  Body Site:  Breast                           Site: Rt breast/LN  Stereotactic Radiosurgery: No  Concurrent Therapy: No  Today's Dose: 5006  Total Dose for Breast: 5256  Today's Fraction/Total Fraction Breast: 19/20  Drainage: 0: Absent                                   Sexuality  Alteration                    Emotional Alteration    Copin: Effective  Comfort Alteration   KPS: 90% Can perform normal activity, minor signs of disease  Fatigue (ONS scale): 6: Moderate Fatigue  Pain Location: denies   Nutrition Alteration   Anorexia: 0: None  Nausea: 0: None  Vomitin: None  Weight: 67.6 kg (149 lb)  Skin Alteration   Skin Sensation: 0: No problem  Skin Reaction: 1: Faint erythema or dry desquamation (Dark spot under Right axilla)  AUA Assessment                                           Accompanied by       Objective:     Exam:     Vitals:    24 1126   BP: 112/53   Pulse: 82   Resp: 20   Temp: 98.1  F (36.7  C)   SpO2: 98%   Weight: 67.6 kg (149 lb)   PainSc: No Pain (0)       Wt Readings from Last 8 Encounters:   24 67.6 kg (149 lb)   24 69.2 kg (152 lb 8 oz)   24 68.5 kg (151 lb 1.6 oz)   24 68.8 kg (151 lb 9.6 oz)   24 70.4 kg (155 lb 4.8 oz)   24 69.9 kg (154 lb 1.6 oz)       General: Alert and oriented, in no acute distress  Lizbet has moderate hyperpigmentation/erythema.  No desquamation.  Stable upper extremity lymphedema.    Treatment Summary to Date    Aria chart and setup information reviewed    Sherri Jacob MD

## 2024-06-21 NOTE — LETTER
6/21/2024      Lizbet Nunez  [address] [place] [address]      Dear Colleague,    Thank you for referring your patient, Lizbet Nunez, to the [facility] RADIATION ONCOLOGY [place]. Please see a copy of my visit note below.    RADIATION ONCOLOGY WEEKLY TREATMENT VISIT NOTE      Assessment / Impression       Visit Dx:  (C50.411,  Z17.0) Malignant neoplasm of upper-outer quadrant of right breast in female, estrogen receptor positive (H)  (primary encounter diagnosis)       Cancer Staging   Malignant neoplasm of upper-outer quadrant of right breast in female, estrogen receptor positive (H)  Staging form: Breast, AJCC 8th Edition  - Pathologic stage from 8/31/2023: Stage IB (pT1c, pN2a, cM0, G2, ER+, [place]+, HER2-) - Signed by Tony Jarvis MD on 5/9/2024       Tolerating radiation therapy well.  All questions and concerns addressed.  Lymphedema stable  Grade 2 radiation dermatitis    Plan:     Continue radiation treatment as prescribed.  Radiation:   Site: Rt breast/LN  Stereotactic Radiosurgery: No  Concurrent Therapy: No  Today's Dose: 5006  Total Dose for Breast: 5256  Today's Fraction/Total Fraction Breast: 19/20  Continue current skin care  Follow-up with lymphedema therapy    Pain Management Plan: N/A    Subjective:      HPI: Lizbet Nunez is a 63 year old female with  Malignant neoplasm of upper-outer quadrant of right breast in female, estrogen receptor positive (H) [C50.411, Z17.0]  She is tolerating radiation therapy well with increasing skin irritation and darkening most notable in the axilla.  No worsening swelling of upper extremity.  No breast swelling.  No pruritus.  Some fatigue.    The following portions of the patient's history were reviewed and updated as appropriate: allergies, current medications, past family history, past medical history, past social history, past surgical history and problem list.    Assessment                  Body Site:  Breast                           Site:  Rt breast/LN  Stereotactic Radiosurgery: No  Concurrent Therapy: No  Today's Dose: 5006  Total Dose for Breast: 5256  Today's Fraction/Total Fraction Breast:   Drainage: 0: Absent                                   Sexuality Alteration                    Emotional Alteration    Copin: Effective  Comfort Alteration   KPS: 90% Can perform normal activity, minor signs of disease  Fatigue (ONS scale): 6: Moderate Fatigue  Pain Location: denies   Nutrition Alteration   Anorexia: 0: None  Nausea: 0: None  Vomitin: None  Weight: 67.6 kg (149 lb)  Skin Alteration   Skin Sensation: 0: No problem  Skin Reaction: 1: Faint erythema or dry desquamation (Dark spot under Right axilla)  AUA Assessment                                           Accompanied by       Objective:     Exam:     Vitals:    24 1126   BP: 112/53   Pulse: 82   Resp: 20   Temp: 98.1  F (36.7  C)   SpO2: 98%   Weight: 67.6 kg (149 lb)   PainSc: No Pain (0)       Wt Readings from Last 8 Encounters:   24 67.6 kg (149 lb)   24 69.2 kg (152 lb 8 oz)   24 68.5 kg (151 lb 1.6 oz)   24 68.8 kg (151 lb 9.6 oz)   24 70.4 kg (155 lb 4.8 oz)   24 69.9 kg (154 lb 1.6 oz)       General: Alert and oriented, in no acute distress  Lizbet has moderate hyperpigmentation/erythema.  No desquamation.  Stable upper extremity lymphedema.    Treatment Summary to Date    Aria chart and setup information reviewed    Sherir Jacob MD      Again, thank you for allowing me to participate in the care of your patient.        Sincerely,        Sherri Jacob MD

## 2024-06-24 ENCOUNTER — ALLIED HEALTH/NURSE VISIT (OUTPATIENT)
Dept: RADIATION ONCOLOGY | Facility: CLINIC | Age: 64
End: 2024-06-24
Attending: STUDENT IN AN ORGANIZED HEALTH CARE EDUCATION/TRAINING PROGRAM
Payer: COMMERCIAL

## 2024-06-24 DIAGNOSIS — C50.411 MALIGNANT NEOPLASM OF UPPER-OUTER QUADRANT OF RIGHT BREAST IN FEMALE, ESTROGEN RECEPTOR POSITIVE (H): Primary | ICD-10-CM

## 2024-06-24 DIAGNOSIS — Z17.0 MALIGNANT NEOPLASM OF UPPER-OUTER QUADRANT OF RIGHT BREAST IN FEMALE, ESTROGEN RECEPTOR POSITIVE (H): Primary | ICD-10-CM

## 2024-06-24 PROCEDURE — 77387 GUIDANCE FOR RADJ TX DLVR: CPT | Performed by: RADIOLOGY

## 2024-06-24 PROCEDURE — 77336 RADIATION PHYSICS CONSULT: CPT | Performed by: STUDENT IN AN ORGANIZED HEALTH CARE EDUCATION/TRAINING PROGRAM

## 2024-06-24 PROCEDURE — 77412 RADIATION TX DELIVERY LVL 3: CPT | Performed by: RADIOLOGY

## 2024-06-24 PROCEDURE — 77427 RADIATION TX MANAGEMENT X5: CPT | Performed by: STUDENT IN AN ORGANIZED HEALTH CARE EDUCATION/TRAINING PROGRAM

## 2024-06-24 NOTE — PROGRESS NOTES
Pt ambulatory to radiation clinic for last tx. Discharge instructions given verbally and in writing by radiation techs. Informed to call with any questions or concerns. Follow up to be made on discharge from department.

## 2024-07-01 ENCOUNTER — TELEPHONE (OUTPATIENT)
Dept: ONCOLOGY | Facility: HOSPITAL | Age: 64
End: 2024-07-01
Payer: COMMERCIAL

## 2024-07-01 NOTE — TELEPHONE ENCOUNTER
I phoned Lizbet to see how she was doing post rad. I received her VM and told her this was a courtesy call and no need to return call if she is doing well. If she has needs however, I left our nursing phone number and wished her well. LUKE Cox, RN, OCN, CBCN

## 2024-07-02 ENCOUNTER — THERAPY VISIT (OUTPATIENT)
Dept: PHYSICAL THERAPY | Facility: REHABILITATION | Age: 64
End: 2024-07-02
Payer: COMMERCIAL

## 2024-07-02 DIAGNOSIS — M25.611 DECREASED RANGE OF MOTION OF RIGHT SHOULDER: ICD-10-CM

## 2024-07-02 DIAGNOSIS — Z17.0 MALIGNANT NEOPLASM OF UPPER-OUTER QUADRANT OF RIGHT BREAST IN FEMALE, ESTROGEN RECEPTOR POSITIVE (H): Primary | ICD-10-CM

## 2024-07-02 DIAGNOSIS — C50.411 MALIGNANT NEOPLASM OF UPPER-OUTER QUADRANT OF RIGHT BREAST IN FEMALE, ESTROGEN RECEPTOR POSITIVE (H): Primary | ICD-10-CM

## 2024-07-02 DIAGNOSIS — I89.0 LYMPHEDEMA: ICD-10-CM

## 2024-07-02 PROCEDURE — 97140 MANUAL THERAPY 1/> REGIONS: CPT | Mod: GP | Performed by: PHYSICAL THERAPIST

## 2024-07-05 NOTE — PROGRESS NOTES
Radiation Treatment Summary          Patient: Lizbet Nunez            MRN: [id number]           : 1960        Care Provider: Sherri Jacob MD         Date of Service: 2024      HISTORY: Lizbet Nunez was treated with 3D conformal radiation therapy.     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 She completed adjuvant chemotherapy in [place]      DX: (C50.411,  Z17.0) Malignant neoplasm of upper-outer quadrant of right breast in female, estrogen receptor positive (H)  (primary encounter diagnosis)    SITE TREATED: Right breast and regional lymph nodes  TOTAL DOSE: 4256 cGy +1000 cGy boost  NUMBER OF FRACTIONS: 16+4 boost  DATES COMPLETED: 2024 - 2024  CONCURRENT CHEMOTHERAPY: No  ADJUVANT THERAPY:Yes: endocrine therapy (anastrozole) and CDK 4/6 inhibitor     Lizbet tolerated the treatment without unexpected side effects.     PLAN: Discharge instructions were given and Lizbet knows to call if questions/issues arise. Lizbet will be seen in f/u in 4-6  weeks without a scan.     Pain Management Plan: N/A    Signed by: Sherri Jacob MD

## 2024-07-11 ENCOUNTER — LAB (OUTPATIENT)
Dept: INFUSION THERAPY | Facility: HOSPITAL | Age: 64
End: 2024-07-11
Attending: INTERNAL MEDICINE
Payer: COMMERCIAL

## 2024-07-11 ENCOUNTER — ONCOLOGY VISIT (OUTPATIENT)
Dept: ONCOLOGY | Facility: HOSPITAL | Age: 64
End: 2024-07-11
Attending: INTERNAL MEDICINE
Payer: COMMERCIAL

## 2024-07-11 VITALS
HEIGHT: 63 IN | SYSTOLIC BLOOD PRESSURE: 94 MMHG | HEART RATE: 75 BPM | DIASTOLIC BLOOD PRESSURE: 69 MMHG | WEIGHT: 148 LBS | TEMPERATURE: 97 F | RESPIRATION RATE: 18 BRPM | OXYGEN SATURATION: 98 % | BODY MASS INDEX: 26.22 KG/M2

## 2024-07-11 DIAGNOSIS — Z51.81 ENCOUNTER FOR MONITORING AROMATASE INHIBITOR THERAPY: ICD-10-CM

## 2024-07-11 DIAGNOSIS — C50.411 MALIGNANT NEOPLASM OF UPPER-OUTER QUADRANT OF RIGHT BREAST IN FEMALE, ESTROGEN RECEPTOR POSITIVE (H): ICD-10-CM

## 2024-07-11 DIAGNOSIS — C50.411 MALIGNANT NEOPLASM OF UPPER-OUTER QUADRANT OF RIGHT BREAST IN FEMALE, ESTROGEN RECEPTOR POSITIVE (H): Primary | ICD-10-CM

## 2024-07-11 DIAGNOSIS — Z79.811 ENCOUNTER FOR MONITORING AROMATASE INHIBITOR THERAPY: ICD-10-CM

## 2024-07-11 DIAGNOSIS — Z17.0 MALIGNANT NEOPLASM OF UPPER-OUTER QUADRANT OF RIGHT BREAST IN FEMALE, ESTROGEN RECEPTOR POSITIVE (H): Primary | ICD-10-CM

## 2024-07-11 DIAGNOSIS — Z17.0 MALIGNANT NEOPLASM OF UPPER-OUTER QUADRANT OF RIGHT BREAST IN FEMALE, ESTROGEN RECEPTOR POSITIVE (H): ICD-10-CM

## 2024-07-11 LAB
ALBUMIN SERPL BCG-MCNC: 3.9 G/DL (ref 3.5–5.2)
ALP SERPL-CCNC: 73 U/L (ref 40–150)
ALT SERPL W P-5'-P-CCNC: 17 U/L (ref 0–50)
ANION GAP SERPL CALCULATED.3IONS-SCNC: 8 MMOL/L (ref 7–15)
AST SERPL W P-5'-P-CCNC: 25 U/L (ref 0–45)
BASOPHILS # BLD AUTO: 0 10E3/UL (ref 0–0.2)
BASOPHILS NFR BLD AUTO: 0 %
BILIRUB SERPL-MCNC: 0.3 MG/DL
BUN SERPL-MCNC: 10.9 MG/DL (ref 8–23)
CALCIUM SERPL-MCNC: 10.4 MG/DL (ref 8.8–10.2)
CHLORIDE SERPL-SCNC: 105 MMOL/L (ref 98–107)
CREAT SERPL-MCNC: 1.15 MG/DL (ref 0.51–0.95)
DEPRECATED HCO3 PLAS-SCNC: 27 MMOL/L (ref 22–29)
EGFRCR SERPLBLD CKD-EPI 2021: 53 ML/MIN/1.73M2
EOSINOPHIL # BLD AUTO: 0.1 10E3/UL (ref 0–0.7)
EOSINOPHIL NFR BLD AUTO: 2 %
ERYTHROCYTE [DISTWIDTH] IN BLOOD BY AUTOMATED COUNT: 13.4 % (ref 10–15)
GLUCOSE SERPL-MCNC: 115 MG/DL (ref 70–99)
HCT VFR BLD AUTO: 38.3 % (ref 35–47)
HGB BLD-MCNC: 12.9 G/DL (ref 11.7–15.7)
IMM GRANULOCYTES # BLD: 0 10E3/UL
IMM GRANULOCYTES NFR BLD: 0 %
LYMPHOCYTES # BLD AUTO: 1.5 10E3/UL (ref 0.8–5.3)
LYMPHOCYTES NFR BLD AUTO: 27 %
MCH RBC QN AUTO: 28.1 PG (ref 26.5–33)
MCHC RBC AUTO-ENTMCNC: 33.7 G/DL (ref 31.5–36.5)
MCV RBC AUTO: 83 FL (ref 78–100)
MONOCYTES # BLD AUTO: 0.4 10E3/UL (ref 0–1.3)
MONOCYTES NFR BLD AUTO: 8 %
NEUTROPHILS # BLD AUTO: 3.5 10E3/UL (ref 1.6–8.3)
NEUTROPHILS NFR BLD AUTO: 63 %
NRBC # BLD AUTO: 0 10E3/UL
NRBC BLD AUTO-RTO: 0 /100
PLATELET # BLD AUTO: 145 10E3/UL (ref 150–450)
POTASSIUM SERPL-SCNC: 3.6 MMOL/L (ref 3.4–5.3)
PROT SERPL-MCNC: 6.1 G/DL (ref 6.4–8.3)
RBC # BLD AUTO: 4.59 10E6/UL (ref 3.8–5.2)
SODIUM SERPL-SCNC: 140 MMOL/L (ref 135–145)
WBC # BLD AUTO: 5.6 10E3/UL (ref 4–11)

## 2024-07-11 PROCEDURE — G2211 COMPLEX E/M VISIT ADD ON: HCPCS | Mod: FS | Performed by: INTERNAL MEDICINE

## 2024-07-11 PROCEDURE — 36415 COLL VENOUS BLD VENIPUNCTURE: CPT

## 2024-07-11 PROCEDURE — 82040 ASSAY OF SERUM ALBUMIN: CPT

## 2024-07-11 PROCEDURE — 85025 COMPLETE CBC W/AUTO DIFF WBC: CPT

## 2024-07-11 PROCEDURE — 99215 OFFICE O/P EST HI 40 MIN: CPT | Mod: FS | Performed by: INTERNAL MEDICINE

## 2024-07-11 PROCEDURE — 99213 OFFICE O/P EST LOW 20 MIN: CPT | Performed by: INTERNAL MEDICINE

## 2024-07-11 ASSESSMENT — PAIN SCALES - GENERAL: PAINLEVEL: NO PAIN (0)

## 2024-07-11 NOTE — PROGRESS NOTES
[facility] Hematology and Oncology Consult Note    Patient: Lizbet Nunez  MRN: [id number]  Date of Service: Jul 11, 2024       Reason for Visit    Chief Complaint   Patient presents with    Oncology Clinic Visit     Returning patient consult: Malignant neoplasm of upper-outer quadrant of right breast in female, estrogen receptor positive after radiation follow up.         Assessment/Plan    ECOG Performance 0 - Independent    #.  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  ER positive, MO positive, HER2 negative (0 by IHC)     She was diagnosed July 2023.  She is status post neoadjuvant AC and Taxol, right lumpectomy, and radiation therapy.  She presents today to start anastrozole and Verzenio.  She tolerated radiation well and her symptoms skin discoloration, dry skin, and hardening of her right breast.  I reviewed labs today hemoglobin is 12.9, platelet count is 125, and anc is 3.5.  CMP shows Cr of 1.15, Calcium 10.4, and protein 6.1. Discussed side effects of anastrozole including muscle aches, rashes, vaginal dryness, and mood changes.  Discussed side effects of Verzenio including diarrhea, cytopenia, and nausea.  She is apprehensive about starting anastrozole due to her significant vasomotor symptoms when she went through menopause.  Discussed possibility of switching anastrozole to a similar drug in the same class.  Discussed the plan of endocrine therapy with anastrozole for 10 years if tolerated and Verzenio for 2 years.  Discussed the importance of decreasing the risk of recurrence as her breast cancer had lymph node involvement.  Follow-up in 3 weeks with labs and visit to assess treatment effects.    #.  hypothyroidism  -Continue home levothyroxine     #.  Small pericardial effusion of unknown clinical significance   Recent CT scan 4/24/2024 showed small pericardial effusion.  She does not have any symptoms.   I recommended to continue to monitor clinically at this point.  We might need to follow-up echocardiogram with symptoms.    #.  Bone health  -She takes multivitamin a day.  Encourage patient to take daily calcium and vitamin D supplementation.    -DEXA 5/14/24 showed osteopenia. Will recheck 5/2026.   -Instructed patient to do daily weightbearing exercises including walking.      # Port removal  Patient requests referral for port removal.  She no longer needs it now that she has completed chemotherapy.    The longitudinal plan of care for the diagnosis(es)/condition(s) as documented were addressed during this visit. Due to the added complexity in care, I will continue to support Lizbet in the subsequent management and with ongoing continuity of care.    Encounter Diagnoses:    Problem List Items Addressed This Visit    None        ______________________________________________________________________________    Staging History   Cancer Staging   Malignant neoplasm of upper-outer quadrant of right breast in female, estrogen receptor positive (H)  Staging form: Breast, AJCC 8th Edition  - Pathologic stage from 8/31/2023: Stage IB (pT1c, pN2a, cM0, G2, ER+, MS+, HER2-) - Signed by Tony Jarvis MD on 5/9/2024        Interval History    Ms. Lizbet Nunez is a very pleasant 63 year old female presented for follow-up after initiating radiation therapy.  She has not yet started Verzenio or anastrozole.  She is apprehensive to start this medication.  She denies any symptoms and is overall feeling well.  She notes some skin changes in the right breast following radiation.    Oncology History    7/2023-screening mammogram detected right breast cancer.  Sequent diagnostic mammogram and ultrasound demonstrated 0.8 x 0.6 x 0.6 cm hypoechoic mass 10 o'clock position in the right breast.  No suspicious lymph nodes in the right axilla.   -Core needle biopsy showed invasive lobular carcinoma with pleomorphic features, grade 2.  ER  "90%, %, HER2 0 by IHC.    7/27/2023-bilateral breast MRI showed 2 x 1.2 x 1.1 cm right breast cancer without evidence of lymphadenopathy.    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    8/31/2023-right breast lumpectomy and right axillary sentinel lymph node biopsy.   Invasive carcinoma with mixed ductal and pleomorphic lobular features, grade 2.  2 cm.  Margins were negative.   DCIS present with closest skin margin to 1 mm.   4/4 sentinel lymph nodes were positive (2 macro mets and 2 micro mets) for metastatic carcinoma with largest metastatic deposit of 5 mm.  Negative for extranodal extension.   DCIS and LCIS present.  ER 90%, MS 80%, HER2 1+ by IHC.   pT1c N2a M0    9/26/2023-PET scan showed no evidence of distant metastasis    10/12/2023-right axillary lymph node dissection   1 of 20 lymph node showed metastatic adenocarcinoma, 4 mm in size.  No extranodal extension.    11/21/2023-4/23/2024-completed adjuvant chemotherapy with dose dense AC followed by weekly paclitaxel in [place].    4/24/2024-CT scan showed interval development of small pericardial effusion.  No pleural effusion.  She has seroma in the right axilla.  No next of malignancy.    7/11/2024: Start anastrozole.  Will start abemaciclib when order arrives.      Review of systems.  Pertinent items noted in history    Past History    No past medical history on file.  Past Surgical History:   Procedure Laterality Date    IR CHEST PORT PLACEMENT > 5 YRS OF AGE  11/7/2023     No family history on file.  Social History     Socioeconomic History    Marital status:        Allergies    Allergies   Allergen Reactions    Aripiprazole Other (See Comments)     Severe Paranoia    Iodinated Contrast Media Other (See Comments)     PN: LW CM1: CONTRAST- NKA Reaction :       Physical Exam    BP 94/69   Pulse 75   Temp 97  F (36.1  C) (Tympanic)   Resp 18   Ht 1.6 m (5' 3\")   Wt 67.1 kg (148 lb)   SpO2 98%   BMI 26.22 kg/m      General: " alert, awake, not in acute distress  HEENT: Head: Normal, normocephalic, atraumatic.  Anicteric  Eye: Normal external eye, conjunctiva, lids cornea  Breasts: Darkened right breast with dry skin.  No ulcerations or skin breaks.  Chest: No increased respiratory effort.  Extremities: no extremity edema   skin: normal. no rash or abnormalities  CNS: non focal.    Lab Results    No results found for this or any previous visit (from the past 168 hour(s)).    Imaging Results    No results found.    Signed by: Marta Dsouza PA-C

## 2024-07-11 NOTE — PROGRESS NOTES
"Oncology Rooming Note    July 11, 2024 3:02 PM   Lizbet Nunez is a 63 year old female who presents for:    Chief Complaint   Patient presents with    Oncology Clinic Visit     Returning patient consult: Malignant neoplasm of upper-outer quadrant of right breast in female, estrogen receptor positive after radiation follow up.     Initial Vitals: BP 94/69   Pulse 75   Temp 97  F (36.1  C) (Tympanic)   Resp 18   Ht 1.6 m (5' 3\")   Wt 67.1 kg (148 lb)   SpO2 98%   BMI 26.22 kg/m   Estimated body mass index is 26.22 kg/m  as calculated from the following:    Height as of this encounter: 1.6 m (5' 3\").    Weight as of this encounter: 67.1 kg (148 lb). Body surface area is 1.73 meters squared.  No Pain (0) Comment: Data Unavailable   No LMP recorded.  Allergies reviewed: Yes  Medications reviewed: Yes    Medications: Medication refills not needed today.  Pharmacy name entered into [facility]: [id number] [address] [facility] [address] [facility]    Frailty Screening:   Is the patient here for a new oncology consult visit in cancer care? 2. No      Clinical concerns:  RETURN CCSL follow up.      Maggie Tovar MA              "

## 2024-07-11 NOTE — LETTER
7/11/2024      Lizbet Nunez  [address] [place] [address]      Dear Colleague,    Thank you for referring your patient, Lizbet Nunez, to the [facility]. Please see a copy of my visit note below.    [facility] Hematology and Oncology Consult Note    Patient: Lizbet Nunez  MRN: [id number]  Date of Service: Jul 11, 2024       Reason for Visit    Chief Complaint   Patient presents with     Oncology Clinic Visit     Returning patient consult: Malignant neoplasm of upper-outer quadrant of right breast in female, estrogen receptor positive after radiation follow up.         Assessment/Plan    ECOG Performance 0 - Independent    #.  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  ER positive, WA positive, HER2 negative (0 by IHC)     She was diagnosed July 2023.  She is status post neoadjuvant AC and Taxol, right lumpectomy, and radiation therapy.  She presents today to start anastrozole and Verzenio.  She tolerated radiation well and her symptoms skin discoloration, dry skin, and hardening of her right breast.  I reviewed labs today hemoglobin is 12.9, platelet count is 125, and anc is 3.5.  CMP shows Cr of 1.15, Calcium 10.4, and protein 6.1. Discussed side effects of anastrozole including muscle aches, rashes, vaginal dryness, and mood changes.  Discussed side effects of Verzenio including diarrhea, cytopenia, and nausea.  She is apprehensive about starting anastrozole due to her significant vasomotor symptoms when she went through menopause.  Discussed possibility of switching anastrozole to a similar drug in the same class.  Discussed the plan of endocrine therapy with anastrozole for 10 years if tolerated and Verzenio for 2 years.  Discussed the importance of decreasing the risk of recurrence as her breast cancer had lymph node involvement.  Follow-up in 3 weeks with labs and visit to  assess treatment effects.    #.  hypothyroidism  -Continue home levothyroxine     #.  Small pericardial effusion of unknown clinical significance   Recent CT scan 4/24/2024 showed small pericardial effusion.  She does not have any symptoms.  I recommended to continue to monitor clinically at this point.  We might need to follow-up echocardiogram with symptoms.    #.  Bone health  -She takes multivitamin a day.  Encourage patient to take daily calcium and vitamin D supplementation.    -DEXA 5/14/24 showed osteopenia. Will recheck 5/2026.   -Instructed patient to do daily weightbearing exercises including walking.      # Port removal  Patient requests referral for port removal.  She no longer needs it now that she has completed chemotherapy.    The longitudinal plan of care for the diagnosis(es)/condition(s) as documented were addressed during this visit. Due to the added complexity in care, I will continue to support Lizbet in the subsequent management and with ongoing continuity of care.    Encounter Diagnoses:    Problem List Items Addressed This Visit    None        ______________________________________________________________________________    Staging History   Cancer Staging   Malignant neoplasm of upper-outer quadrant of right breast in female, estrogen receptor positive (H)  Staging form: Breast, AJCC 8th Edition  - Pathologic stage from 8/31/2023: Stage IB (pT1c, pN2a, cM0, G2, ER+, WV+, HER2-) - Signed by Tony Jarvis MD on 5/9/2024        Interval History    Ms. Lizbet Nunez is a very pleasant 63 year old female presented for follow-up after initiating radiation therapy.  She has not yet started Verzenio or anastrozole.  She is apprehensive to start this medication.  She denies any symptoms and is overall feeling well.  She notes some skin changes in the right breast following radiation.    Oncology History    7/2023-screening mammogram detected right breast cancer.  Sequent diagnostic mammogram and  ultrasound demonstrated 0.8 x 0.6 x 0.6 cm hypoechoic mass 10 o'clock position in the right breast.  No suspicious lymph nodes in the right axilla.   -Core needle biopsy showed invasive lobular carcinoma with pleomorphic features, grade 2.  ER 90%, %, HER2 0 by IHC.    7/27/2023-bilateral breast MRI showed 2 x 1.2 x 1.1 cm right breast cancer without evidence of lymphadenopathy.    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    8/31/2023-right breast lumpectomy and right axillary sentinel lymph node biopsy.   Invasive carcinoma with mixed ductal and pleomorphic lobular features, grade 2.  2 cm.  Margins were negative.   DCIS present with closest skin margin to 1 mm.   4/4 sentinel lymph nodes were positive (2 macro mets and 2 micro mets) for metastatic carcinoma with largest metastatic deposit of 5 mm.  Negative for extranodal extension.   DCIS and LCIS present.  ER 90%, IA 80%, HER2 1+ by IHC.   pT1c N2a M0    9/26/2023-PET scan showed no evidence of distant metastasis    10/12/2023-right axillary lymph node dissection   1 of 20 lymph node showed metastatic adenocarcinoma, 4 mm in size.  No extranodal extension.    11/21/2023-4/23/2024-completed adjuvant chemotherapy with dose dense AC followed by weekly paclitaxel in [place].    4/24/2024-CT scan showed interval development of small pericardial effusion.  No pleural effusion.  She has seroma in the right axilla.  No next of malignancy.    7/11/2024: Start anastrozole.  Will start abemaciclib when order arrives.      Review of systems.  Pertinent items noted in history    Past History    No past medical history on file.  Past Surgical History:   Procedure Laterality Date     IR CHEST PORT PLACEMENT > 5 YRS OF AGE  11/7/2023     No family history on file.  Social History     Socioeconomic History     Marital status:        Allergies    Allergies   Allergen Reactions     Aripiprazole Other (See Comments)     Severe Paranoia     Iodinated  "Contrast Media Other (See Comments)     PN: LW CM1: CONTRAST- NKA Reaction :       Physical Exam    BP 94/69   Pulse 75   Temp 97  F (36.1  C) (Tympanic)   Resp 18   Ht 1.6 m (5' 3\")   Wt 67.1 kg (148 lb)   SpO2 98%   BMI 26.22 kg/m      General: alert, awake, not in acute distress  HEENT: Head: Normal, normocephalic, atraumatic.  Anicteric  Eye: Normal external eye, conjunctiva, lids cornea  Breasts: Darkened right breast with dry skin.  No ulcerations or skin breaks.  Chest: No increased respiratory effort.  Extremities: no extremity edema   skin: normal. no rash or abnormalities  CNS: non focal.    Lab Results    No results found for this or any previous visit (from the past 168 hour(s)).    Imaging Results    No results found.    Signed by: Marta Dsouza PA-C     Attestation signed by Tony Jarvis MD at 7/11/2024 10:49 PM (Updated):      Physician Attestation    I saw and evaluated Lizbet Nunez as part of a shared APRN/PA visit.     I personally reviewed the vital signs, medications, and labs.    I personally provided a substantive portion of care for this patient and I approve the care plan as written by the BURTON.  I was involved with Medical Decision Making including: starting anastrozole, starting abemaciclib, reviewing their side effects and schedules, follow up plans  40 MINUTES SPENT BY ME on the date of service doing chart review, history, exam, documentation & further activities per the note.    Tony Jarvis MD  Date of Service (when I saw the patient): 07/11/24    Oncology Rooming Note    July 11, 2024 3:02 PM   Lizbet Nunez is a 63 year old female who presents for:    Chief Complaint   Patient presents with     Oncology Clinic Visit     Returning patient consult: Malignant neoplasm of upper-outer quadrant of right breast in female, estrogen receptor positive after radiation follow up.     Initial Vitals: BP 94/69   Pulse 75   Temp 97  F (36.1  C) (Tympanic)   Resp 18   Ht 1.6 m " "(5' 3\")   Wt 67.1 kg (148 lb)   SpO2 98%   BMI 26.22 kg/m   Estimated body mass index is 26.22 kg/m  as calculated from the following:    Height as of this encounter: 1.6 m (5' 3\").    Weight as of this encounter: 67.1 kg (148 lb). Body surface area is 1.73 meters squared.  No Pain (0) Comment: Data Unavailable   No LMP recorded.  Allergies reviewed: Yes  Medications reviewed: Yes    Medications: Medication refills not needed today.  Pharmacy name entered into Cardback: [facility] #[id number] [place], [place] - [address] AT [facility] [address]    Frailty Screening:   Is the patient here for a new oncology consult visit in cancer care? 2. No      Clinical concerns:  RETURN CCSL follow up.      Maggie Tovar MA                Again, thank you for allowing me to participate in the care of your patient.        Sincerely,        Tony Jarvis MD  "

## 2024-07-12 ENCOUNTER — TELEPHONE (OUTPATIENT)
Dept: ONCOLOGY | Facility: HOSPITAL | Age: 64
End: 2024-07-12
Payer: COMMERCIAL

## 2024-07-12 NOTE — TELEPHONE ENCOUNTER
Prior Authorization Approval    Medication: VERZENIO 150 MG PO TABS  Authorization Effective Date: 7/12/2024  Authorization Expiration Date: 7/12/2025  Approved Dose/Quantity: 56/28 days  Reference #: [id number]   Insurance Company: [employer] - Phone [phone] Fax [phone]  Expected CoPay: $ 110  Which Pharmacy is filling the prescription: [facility] - [place], [place] - [address] [place] [address]

## 2024-07-12 NOTE — ORAL ONC MGMT
Oral Chemotherapy Monitoring Program   Left Voicemail    Attempted to contact patient today for follow up regarding oral chemotherapy, abemaciclib, for new teach. No answer. Left voicemail for patient to call us back at [phone] when able. No medication name was left.    Brenda Cordova, PharmD, [facility]  Oral Chemotherapy Pharmacist  [phone]

## 2024-07-15 ENCOUNTER — TELEPHONE (OUTPATIENT)
Dept: ONCOLOGY | Facility: HOSPITAL | Age: 64
End: 2024-07-15
Payer: COMMERCIAL

## 2024-07-15 NOTE — ORAL ONC MGMT
Oral Chemotherapy Monitoring Program   Left Voicemail    Attempted to contact patient today for follow up regarding oral chemotherapy, waldo, for new teach. No answer. Left voicemail for patient to call us back at [phone] when able. No medication name was left.    Raji Rowell, PharmD, [facility]  Clinical Oncology Pharmacist  July 15, 2024

## 2024-07-16 ENCOUNTER — PATIENT OUTREACH (OUTPATIENT)
Dept: ONCOLOGY | Facility: HOSPITAL | Age: 64
End: 2024-07-16
Payer: COMMERCIAL

## 2024-07-16 ENCOUNTER — TELEPHONE (OUTPATIENT)
Dept: ONCOLOGY | Facility: HOSPITAL | Age: 64
End: 2024-07-16
Payer: COMMERCIAL

## 2024-07-16 NOTE — PROGRESS NOTES
[facility]: Cancer Care                                                                                            Situation: Patient chart reviewed by care coordinator.  Background: Patient has history of clinical stage IIIA/pathologic stage IB ([id number]) invasive carcinoma with mixed ductal and lobular features, grade 2. ER positive, HI positive, HER2 negative (0 by IHC).  She was diagnosed July 2023.  She is status post neoadjuvant AC and Taxol, right lumpectomy, and radiation therapy     Assessment: Patient saw Dr. Jarvis in follow on 7/11/24 to discuss starting anastrozole and Verzenio.    Plan/Recommendations: Patient to follow-up in 3 weeks for labs and assess treatment effects.   Patient scheduled for labs, Bree Goetz CNP on 8/1/24.    [employer] message sent to patient with contact information for Cancer Care Triage and instructed how to call during clinic hours and after hours.    Signature:  Laverne Green RN

## 2024-07-16 NOTE — ORAL ONC MGMT
Oral Chemotherapy Monitoring Program   Left Voicemail    Attempted to contact patient today for follow up regarding oral chemotherapy, abemaciclib, for new teach. No answer. Left voicemail for patient to call us back at [phone] when able. No medication name was left.    Raji Rowell, PharmD, [facility]  Clinical Oncology Pharmacist  July 16, 2024  [phone]

## 2024-07-18 ENCOUNTER — TELEPHONE (OUTPATIENT)
Dept: ONCOLOGY | Facility: HOSPITAL | Age: 64
End: 2024-07-18
Payer: COMMERCIAL

## 2024-07-18 NOTE — ORAL ONC MGMT
Oral Chemotherapy Monitoring Program   Left Voicemail    Attempted to contact patient today for follow up regarding oral chemotherapy, abemaciclib, for new teach. No answer. Left voicemail for patient to call us back at [phone] when able. No medication name was left.    Raji Rowell, PharmD, [facility]  Clinical Oncology Pharmacist  July 18, 2024

## 2024-07-18 NOTE — ORAL ONC MGMT
"Oral Chemotherapy Monitoring Program    Subjective/Objective:  Lizbet Nunez is a 63 year old female contacted by phone for an initial visit for oral chemotherapy education.  Lizbet says she currently has some borderline constipation and uses sennakot as needed (without much help really) so hopes the abemaciclib helps her be more regular without pushing her over the other way. Did say she has some loperamide available if needed. Does have prochlorperazine on hand and said it helped with her nausea from the infusion chemotherapy in the past.         7/12/2024    10:00 AM 7/15/2024    10:00 AM 7/16/2024     9:00 AM 7/18/2024     9:00 AM 7/18/2024     3:00 PM   ORAL CHEMOTHERAPY   Assessment Type Initial Work up;New Teach;Left Voicemail Left Voicemail Left Voicemail Left Voicemail New Teach   Diagnosis Code Breast Cancer Breast Cancer Breast Cancer Breast Cancer Breast Cancer   Providers Dr. Matheus Jarvis   Clinic Name/Location [facility]   Drug Name Verzenio (abemaciclib) Verzenio (abemaciclib) Verzenio (abemaciclib) Verzenio (abemaciclib) Verzenio (abemaciclib)   Dose 150 mg 150 mg 150 mg 150 mg 150 mg   Current Schedule BID BID BID BID BID   Cycle Details Continuous Continuous Continuous Continuous Continuous   Any new drug interactions? Yes Yes   Yes   Pharmacist Intervention? Yes Yes   Yes   Intervention(s) Patient Education Patient Education   Patient Education   Is the dose as ordered appropriate for the patient? Yes Yes   Yes       Last PHQ-2 Score on record:        No data to display                Vitals:  BP:   BP Readings from Last 1 Encounters:   07/11/24 94/69     Wt Readings from Last 1 Encounters:   07/11/24 67.1 kg (148 lb)     Estimated body surface area is 1.73 meters squared as calculated from the following:    Height as of 7/11/24: 1.6 m (5' 3\").    Weight as of 7/11/24: 67.1 kg (148 lb).    Labs:  _  Result Component " Current Result Ref Range   Sodium 140 (7/11/2024) 135 - 145 mmol/L     _  Result Component Current Result Ref Range   Potassium 3.6 (7/11/2024) 3.4 - 5.3 mmol/L     _  Result Component Current Result Ref Range   Calcium 10.4 (H) (7/11/2024) 8.8 - 10.2 mg/dL     No results found for Mag within last 30 days.     No results found for Phos within last 30 days.     _  Result Component Current Result Ref Range   Albumin 3.9 (7/11/2024) 3.5 - 5.2 g/dL     _  Result Component Current Result Ref Range   Urea Nitrogen 10.9 (7/11/2024) 8.0 - 23.0 mg/dL     _  Result Component Current Result Ref Range   Creatinine 1.15 (H) (7/11/2024) 0.51 - 0.95 mg/dL     _  Result Component Current Result Ref Range   AST 25 (7/11/2024) 0 - 45 U/L     _  Result Component Current Result Ref Range   ALT 17 (7/11/2024) 0 - 50 U/L     _  Result Component Current Result Ref Range   Bilirubin Total 0.3 (7/11/2024) <=1.2 mg/dL     _  Result Component Current Result Ref Range   WBC Count 5.6 (7/11/2024) 4.0 - 11.0 10e3/uL     _  Result Component Current Result Ref Range   Hemoglobin 12.9 (7/11/2024) 11.7 - 15.7 g/dL     _  Result Component Current Result Ref Range   Platelet Count 145 (L) (7/11/2024) 150 - 450 10e3/uL     No results found for ANC within last 30 days.     _  Result Component Current Result Ref Range   Absolute Neutrophils 3.5 (7/11/2024) 1.6 - 8.3 10e3/uL        Assessment:  Patient is appropriate to start therapy.    Plan:  Basic chemotherapy teaching was reviewed with the patient including indication, start date of therapy, dose, administration, adverse effects, missed doses, food and drug interactions, monitoring, side effect management, office contact information, and safe handling. Written materials were provided and all questions answered.    Follow-Up:  Will plan to call Lizbet about a week after starting abemaciclib for her initial assessment call. Lizbet S Mayra agrees to the plan and knows they can call us in the meantime if  they have any questions or concerns.       Raji Rowell, PharmD, [facility]  Clinical Oncology Pharmacist  July 18, 2024

## 2024-07-19 ENCOUNTER — MYC MEDICAL ADVICE (OUTPATIENT)
Dept: INTERVENTIONAL RADIOLOGY/VASCULAR | Facility: CLINIC | Age: 64
End: 2024-07-19
Payer: COMMERCIAL

## 2024-07-25 ENCOUNTER — PATIENT OUTREACH (OUTPATIENT)
Dept: ONCOLOGY | Facility: HOSPITAL | Age: 64
End: 2024-07-25
Payer: COMMERCIAL

## 2024-07-25 NOTE — PROGRESS NOTES
[facility]: Cancer Care                                                                                          Called patient and left message (no patient identifiers left) checking in to see if she has received her medication (no medication name left) or when she anticipates receiving it.  Told her will need to adjust her upcoming appointments to 2 weeks after starting her medication. Instructed to call back:  [phone].      Signature:  Laverne Green RN

## 2024-07-29 ENCOUNTER — TELEPHONE (OUTPATIENT)
Dept: ONCOLOGY | Facility: HOSPITAL | Age: 64
End: 2024-07-29
Payer: COMMERCIAL

## 2024-07-29 NOTE — PROGRESS NOTES
Patient called, was given appointment information for 8/01/24, to arrive at 1:15 pm. She agrees with plan. Sada Bear RN

## 2024-07-29 NOTE — ORAL ONC MGMT
Oral Chemotherapy Monitoring Program    Subjective/Objective:  Lizbet Nunez is a 63 year old female contacted by phone for a follow-up visit for oral chemotherapy.  Lizbet confirms starting abemaciclib 150 mg twice daily about 12 hours apart. She said she had diarrhea for 3 days, tooks some loperamide, and now has been constipated for 3 days. However, constipation is normal to her. She said she would expect to go today, otherwise will take senna at home which works. She is trying to drink more fluids. Denies nausea. Having some cramping likely related to constipation but will monitor. Denies other side effects.        7/12/2024    10:00 AM 7/15/2024    10:00 AM 7/16/2024     9:00 AM 7/18/2024     9:00 AM 7/18/2024     3:00 PM 7/29/2024     2:00 PM   ORAL CHEMOTHERAPY   Assessment Type Initial Work up;New Teach;Left Voicemail Left Voicemail Left Voicemail Left Voicemail New Teach Initial Follow up   Diagnosis Code Breast Cancer Breast Cancer Breast Cancer Breast Cancer Breast Cancer Breast Cancer   Providers Dr. Matheus Jarvis   Clinic Name/Location [facility]   Drug Name Verzenio (abemaciclib) Verzenio (abemaciclib) Verzenio (abemaciclib) Verzenio (abemaciclib) Verzenio (abemaciclib) Verzenio (abemaciclib)   Dose 150 mg 150 mg 150 mg 150 mg 150 mg 150 mg   Current Schedule BID BID BID BID BID BID   Cycle Details Continuous Continuous Continuous Continuous Continuous Continuous   Start Date of Last Cycle      7/22/2024   Doses missed in last 2 weeks      0   Adherence Assessment      Adherent   Any new drug interactions? Yes Yes   Yes    Pharmacist Intervention? Yes Yes   Yes    Intervention(s) Patient Education Patient Education   Patient Education    Is the dose as ordered appropriate for the patient? Yes Yes   Yes        Last PHQ-2 Score on record:        No data to display                Vitals:  BP:   BP Readings from  "Last 1 Encounters:   07/11/24 94/69     Wt Readings from Last 1 Encounters:   07/11/24 67.1 kg (148 lb)     Estimated body surface area is 1.73 meters squared as calculated from the following:    Height as of 7/11/24: 1.6 m (5' 3\").    Weight as of 7/11/24: 67.1 kg (148 lb).    Labs:  _  Result Component Current Result Ref Range   Sodium 140 (7/11/2024) 135 - 145 mmol/L     _  Result Component Current Result Ref Range   Potassium 3.6 (7/11/2024) 3.4 - 5.3 mmol/L     _  Result Component Current Result Ref Range   Calcium 10.4 (H) (7/11/2024) 8.8 - 10.2 mg/dL     No results found for Mag within last 30 days.     No results found for Phos within last 30 days.     _  Result Component Current Result Ref Range   Albumin 3.9 (7/11/2024) 3.5 - 5.2 g/dL     _  Result Component Current Result Ref Range   Urea Nitrogen 10.9 (7/11/2024) 8.0 - 23.0 mg/dL     _  Result Component Current Result Ref Range   Creatinine 1.15 (H) (7/11/2024) 0.51 - 0.95 mg/dL     _  Result Component Current Result Ref Range   AST 25 (7/11/2024) 0 - 45 U/L     _  Result Component Current Result Ref Range   ALT 17 (7/11/2024) 0 - 50 U/L     _  Result Component Current Result Ref Range   Bilirubin Total 0.3 (7/11/2024) <=1.2 mg/dL     _  Result Component Current Result Ref Range   WBC Count 5.6 (7/11/2024) 4.0 - 11.0 10e3/uL     _  Result Component Current Result Ref Range   Hemoglobin 12.9 (7/11/2024) 11.7 - 15.7 g/dL     _  Result Component Current Result Ref Range   Platelet Count 145 (L) (7/11/2024) 150 - 450 10e3/uL     No results found for ANC within last 30 days.     _  Result Component Current Result Ref Range   Absolute Neutrophils 3.5 (7/11/2024) 1.6 - 8.3 10e3/uL      Assessment/Plan:  Lizbet is tolerating abemaciclib but having some constipation. Since constipation is not new to her, she feels comfortable with monitoring and taking senna tonight if she doesn't have a bowel movement. Encouraged her to reach out to us if needed, or if cramping " isn't alleviated after a bowel movement. She does see a provider this week.    Follow-Up:  8/1    Refill Due:  8/9    Brenda Cordova, PharmD, [facility]  Oral Chemotherapy Pharmacist  [phone]

## 2024-07-29 NOTE — PROGRESS NOTES
"Patient called and left message on triage line.   States she received \"cancer pill\" and started taking it on 7/22/24.   Discussed with tanesha [facility] Care coordinator, will keep appt as scheduled. Left message for patient to return call.     Sada Bear RN      "

## 2024-08-01 ENCOUNTER — ONCOLOGY VISIT (OUTPATIENT)
Dept: ONCOLOGY | Facility: HOSPITAL | Age: 64
End: 2024-08-01
Payer: COMMERCIAL

## 2024-08-01 ENCOUNTER — LAB (OUTPATIENT)
Dept: INFUSION THERAPY | Facility: HOSPITAL | Age: 64
End: 2024-08-01
Attending: INTERNAL MEDICINE
Payer: COMMERCIAL

## 2024-08-01 ENCOUNTER — OFFICE VISIT (OUTPATIENT)
Dept: RADIATION ONCOLOGY | Facility: CLINIC | Age: 64
End: 2024-08-01
Attending: STUDENT IN AN ORGANIZED HEALTH CARE EDUCATION/TRAINING PROGRAM
Payer: COMMERCIAL

## 2024-08-01 VITALS
OXYGEN SATURATION: 96 % | TEMPERATURE: 98.2 F | BODY MASS INDEX: 25.9 KG/M2 | SYSTOLIC BLOOD PRESSURE: 122 MMHG | DIASTOLIC BLOOD PRESSURE: 61 MMHG | WEIGHT: 146.2 LBS | HEART RATE: 98 BPM | RESPIRATION RATE: 20 BRPM

## 2024-08-01 VITALS
SYSTOLIC BLOOD PRESSURE: 119 MMHG | HEART RATE: 73 BPM | RESPIRATION RATE: 18 BRPM | BODY MASS INDEX: 26.08 KG/M2 | WEIGHT: 147.2 LBS | OXYGEN SATURATION: 97 % | TEMPERATURE: 96.8 F | DIASTOLIC BLOOD PRESSURE: 77 MMHG | HEIGHT: 63 IN

## 2024-08-01 DIAGNOSIS — C50.411 MALIGNANT NEOPLASM OF UPPER-OUTER QUADRANT OF RIGHT BREAST IN FEMALE, ESTROGEN RECEPTOR POSITIVE (H): ICD-10-CM

## 2024-08-01 DIAGNOSIS — C50.411 MALIGNANT NEOPLASM OF UPPER-OUTER QUADRANT OF RIGHT BREAST IN FEMALE, ESTROGEN RECEPTOR POSITIVE (H): Primary | ICD-10-CM

## 2024-08-01 DIAGNOSIS — Z51.81 ENCOUNTER FOR MONITORING AROMATASE INHIBITOR THERAPY: ICD-10-CM

## 2024-08-01 DIAGNOSIS — Z17.0 MALIGNANT NEOPLASM OF UPPER-OUTER QUADRANT OF RIGHT BREAST IN FEMALE, ESTROGEN RECEPTOR POSITIVE (H): ICD-10-CM

## 2024-08-01 DIAGNOSIS — Z78.0 MENOPAUSE: ICD-10-CM

## 2024-08-01 DIAGNOSIS — Z17.0 MALIGNANT NEOPLASM OF UPPER-OUTER QUADRANT OF RIGHT BREAST IN FEMALE, ESTROGEN RECEPTOR POSITIVE (H): Primary | ICD-10-CM

## 2024-08-01 DIAGNOSIS — Z79.811 ENCOUNTER FOR MONITORING AROMATASE INHIBITOR THERAPY: ICD-10-CM

## 2024-08-01 DIAGNOSIS — Z79.811 LONG TERM CURRENT USE OF AROMATASE INHIBITOR: ICD-10-CM

## 2024-08-01 LAB
ALBUMIN SERPL BCG-MCNC: 4 G/DL (ref 3.5–5.2)
ALP SERPL-CCNC: 67 U/L (ref 40–150)
ALT SERPL W P-5'-P-CCNC: 13 U/L (ref 0–50)
ANION GAP SERPL CALCULATED.3IONS-SCNC: 8 MMOL/L (ref 7–15)
AST SERPL W P-5'-P-CCNC: 23 U/L (ref 0–45)
BASOPHILS # BLD AUTO: 0 10E3/UL (ref 0–0.2)
BASOPHILS NFR BLD AUTO: 0 %
BILIRUB SERPL-MCNC: 0.4 MG/DL
BUN SERPL-MCNC: 7.1 MG/DL (ref 8–23)
CALCIUM SERPL-MCNC: 11.6 MG/DL (ref 8.8–10.4)
CHLORIDE SERPL-SCNC: 106 MMOL/L (ref 98–107)
CREAT SERPL-MCNC: 0.96 MG/DL (ref 0.51–0.95)
EGFRCR SERPLBLD CKD-EPI 2021: 66 ML/MIN/1.73M2
EOSINOPHIL # BLD AUTO: 0.1 10E3/UL (ref 0–0.7)
EOSINOPHIL NFR BLD AUTO: 2 %
ERYTHROCYTE [DISTWIDTH] IN BLOOD BY AUTOMATED COUNT: 13.2 % (ref 10–15)
GLUCOSE SERPL-MCNC: 89 MG/DL (ref 70–99)
HCO3 SERPL-SCNC: 24 MMOL/L (ref 22–29)
HCT VFR BLD AUTO: 40.5 % (ref 35–47)
HGB BLD-MCNC: 13.6 G/DL (ref 11.7–15.7)
IMM GRANULOCYTES # BLD: 0 10E3/UL
IMM GRANULOCYTES NFR BLD: 1 %
LYMPHOCYTES # BLD AUTO: 1.6 10E3/UL (ref 0.8–5.3)
LYMPHOCYTES NFR BLD AUTO: 28 %
MCH RBC QN AUTO: 27.8 PG (ref 26.5–33)
MCHC RBC AUTO-ENTMCNC: 33.6 G/DL (ref 31.5–36.5)
MCV RBC AUTO: 83 FL (ref 78–100)
MONOCYTES # BLD AUTO: 0.3 10E3/UL (ref 0–1.3)
MONOCYTES NFR BLD AUTO: 4 %
NEUTROPHILS # BLD AUTO: 3.7 10E3/UL (ref 1.6–8.3)
NEUTROPHILS NFR BLD AUTO: 65 %
NRBC # BLD AUTO: 0 10E3/UL
NRBC BLD AUTO-RTO: 0 /100
PLATELET # BLD AUTO: 169 10E3/UL (ref 150–450)
POTASSIUM SERPL-SCNC: 4 MMOL/L (ref 3.4–5.3)
PROT SERPL-MCNC: 6.6 G/DL (ref 6.4–8.3)
RBC # BLD AUTO: 4.9 10E6/UL (ref 3.8–5.2)
SODIUM SERPL-SCNC: 138 MMOL/L (ref 135–145)
WBC # BLD AUTO: 5.7 10E3/UL (ref 4–11)

## 2024-08-01 PROCEDURE — 85025 COMPLETE CBC W/AUTO DIFF WBC: CPT

## 2024-08-01 PROCEDURE — 99214 OFFICE O/P EST MOD 30 MIN: CPT

## 2024-08-01 PROCEDURE — G2211 COMPLEX E/M VISIT ADD ON: HCPCS

## 2024-08-01 PROCEDURE — 36415 COLL VENOUS BLD VENIPUNCTURE: CPT

## 2024-08-01 PROCEDURE — 99215 OFFICE O/P EST HI 40 MIN: CPT

## 2024-08-01 PROCEDURE — 84460 ALANINE AMINO (ALT) (SGPT): CPT

## 2024-08-01 PROCEDURE — 99214 OFFICE O/P EST MOD 30 MIN: CPT | Mod: 27 | Performed by: STUDENT IN AN ORGANIZED HEALTH CARE EDUCATION/TRAINING PROGRAM

## 2024-08-01 ASSESSMENT — PAIN SCALES - GENERAL
PAINLEVEL: NO PAIN (0)
PAINLEVEL: NO PAIN (0)

## 2024-08-01 NOTE — LETTER
"8/1/2024      Lizbet Nunez  [address] [place] [address]      Dear Colleague,    Thank you for referring your patient, Lizbet Nunez, to the [facility]. Please see a copy of my visit note below.    Oncology Rooming Note    August 1, 2024 2:15 PM   Lizbet Nunez is a 63 year old female who presents for:    Chief Complaint   Patient presents with     Oncology Clinic Visit     Returning patient consult: Malignant neoplasm of upper-outer quadrant of right breast in female, estrogen receptor positive follow up, labs results.     Initial Vitals: /77   Pulse 73   Temp 96.8  F (36  C) (Tympanic)   Resp 18   Ht 1.6 m (5' 3\")   Wt 66.8 kg (147 lb 3.2 oz)   SpO2 97%   BMI 26.08 kg/m   Estimated body mass index is 26.08 kg/m  as calculated from the following:    Height as of this encounter: 1.6 m (5' 3\").    Weight as of this encounter: 66.8 kg (147 lb 3.2 oz). Body surface area is 1.72 meters squared.  No Pain (0) Comment: Data Unavailable   No LMP recorded.  Allergies reviewed: Yes  Medications reviewed: Yes    Medications: Medication refills not needed today.  Pharmacy name entered into [employer]: [id number] - [place], [place] - [address] AT [facility] OF [facility]    Frailty Screening:   Is the patient here for a new oncology consult visit in cancer care? 2. No      Clinical concerns:  RETURN CCSL, labs results.      Maggie Tovar MA                [facility] Hematology and Oncology Outpatient Progress Note    Patient: Lizbet Nunez  MRN: [id number]  Date of Service: Aug 1, 2024          Reason for Visit    Chief Complaint   Patient presents with     Oncology Clinic Visit     Returning patient consult: Malignant neoplasm of upper-outer quadrant of right breast in female, estrogen receptor positive follow up, labs results.        Cancer Staging   Malignant neoplasm of upper-outer quadrant of right breast in female, estrogen receptor positive " (H)  Staging form: Breast, AJCC 8th Edition  - Pathologic stage from 8/31/2023: Stage IB (pT1c, pN2a, cM0, G2, ER+, TX+, HER2-) - Signed by Tony Jarvis MD on 5/9/2024      Primary Hematologist/Oncologist: Dr. Tony Jarvis        Assessment/Plan  #.  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  ER positive, TX positive, HER2 negative (0 by IHC)  She was diagnosed July 2023.  She is status post neoadjuvant AC and Taxol, right lumpectomy, and radiation therapy.  She tolerated radiation well and her symptoms skin discoloration, dry skin, and hardening of her right breast. She started anastrozole and abemaciclib on 7/22/24 and appears to have good toleration thus far with some manageable diarrhea. Denies any significant side effects from Anastrozole. We reviewed labs today including a CBC and CMP. CMP shows a stable/improved creatinine of 0.96 with normal LFTs and normal electrolyte balances. Her CBC is unremarkable. I recommend that she continue with current treatment regimen.  Continue anastrozole daily  Continue Abemaciclib 150 mg BID  Follow-up in 2 weeks with labs and visit to assess treatment effects.    #.  Hypothyroidism  -Continue home levothyroxine     #.  Small pericardial effusion of unknown clinical significance  Recent CT scan 4/24/2024 showed small pericardial effusion.  She does not have any symptoms.  Continue to monitor clinically at this point.  We might need to follow-up echocardiogram with symptoms.    #.  Bone health  -She takes multivitamin a day.  Encourage patient to take daily calcium and vitamin D supplementation.    -DEXA 5/14/24 showed osteopenia. Will recheck 5/2026.   -Instructed patient to do daily weightbearing exercises including walking.      # Port removal  Patient requests referral for port removal.  She no longer needs it now that she has completed chemotherapy.  Proceed with port removal as  scheduled on 8/5/24.    ______________________________________________________________________________    History of Present Illness/ Interval History    Ms. Lizbet Nunez  is a 63 year old female patient who comes in for follow up. She started verzenio on 7/22, ~ 2 weeks ago. She notes some diarrhea - 3 times per day at first, but now more like twice. Has been utilizing incontinence pads, just in case. Used imodium with adequate relief, 3 tabs at most per day. She notes some lower abdominal cramping that usually coincided with the diarrhea symptoms. Otherwise she reports feeling well. Denies nausea. Has adequate oral intake.       ECOG performance status   0- Fully active, without restriction      Pain  Pain Score: No Pain (0)    ROS  A 14 point review of systems was obtained.  Positive findings noted in the history.  The remainder of the review of system is otherwise negative.      Oncology History/Treatment  Interval History    Ms. Lizbet Nunez is a very pleasant 63 year old female presented for follow-up after initiating radiation therapy.  She has not yet started Verzenio or anastrozole.  She is apprehensive to start this medication.  She denies any symptoms and is overall feeling well.  She notes some skin changes in the right breast following radiation.    Oncology History    7/2023-screening mammogram detected right breast cancer.  Sequent diagnostic mammogram and ultrasound demonstrated 0.8 x 0.6 x 0.6 cm hypoechoic mass 10 o'clock position in the right breast.  No suspicious lymph nodes in the right axilla.   -Core needle biopsy showed invasive lobular carcinoma with pleomorphic features, grade 2.  ER 90%, %, HER2 0 by IHC.    7/27/2023-bilateral breast MRI showed 2 x 1.2 x 1.1 cm right breast cancer without evidence of lymphadenopathy.    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    8/31/2023-right breast lumpectomy and right axillary sentinel lymph node biopsy.   Invasive carcinoma  "with mixed ductal and pleomorphic lobular features, grade 2.  2 cm.  Margins were negative.   DCIS present with closest skin margin to 1 mm.   4/4 sentinel lymph nodes were positive (2 macro mets and 2 micro mets) for metastatic carcinoma with largest metastatic deposit of 5 mm.  Negative for extranodal extension.   DCIS and LCIS present.  ER 90%, NH 80%, HER2 1+ by IHC.   pT1c N2a M0    9/26/2023-PET scan showed no evidence of distant metastasis    10/12/2023-right axillary lymph node dissection   1 of 20 lymph node showed metastatic adenocarcinoma, 4 mm in size.  No extranodal extension.    11/21/2023-4/23/2024-completed adjuvant chemotherapy with dose dense AC followed by weekly paclitaxel in [place].    4/24/2024-CT scan showed interval development of small pericardial effusion.  No pleural effusion.  She has seroma in the right axilla.  No next of malignancy.    7/11/2024: Start anastrozole.      7/22/24: Started abemaciclib.      Physical Exam    /77   Pulse 73   Temp 96.8  F (36  C) (Tympanic)   Resp 18   Ht 1.6 m (5' 3\")   Wt 66.8 kg (147 lb 3.2 oz)   SpO2 97%   BMI 26.08 kg/m      GENERAL: no acute distress. Cooperative in conversation.   HEENT: pupils are equal, round and reactive. Oral mucosa is moist and intact.  RESP:Chest symmetric. Regular respiratory rate. No stridor.  ABD: Nondistended, soft.  EXTREMITIES: No lower extremity edema.   NEURO: non focal. Alert and oriented x3.   PSYCH: within normal limits. No depression or anxiety.  SKIN: warm dry intact      Lab Results    Recent Results (from the past 168 hour(s))   Comprehensive metabolic panel   Result Value Ref Range    Sodium 138 135 - 145 mmol/L    Potassium 4.0 3.4 - 5.3 mmol/L    Carbon Dioxide (CO2) 24 22 - 29 mmol/L    Anion Gap 8 7 - 15 mmol/L    Urea Nitrogen 7.1 (L) 8.0 - 23.0 mg/dL    Creatinine 0.96 (H) 0.51 - 0.95 mg/dL    GFR Estimate 66 >60 mL/min/1.73m2    Calcium 11.6 (H) 8.8 - 10.4 mg/dL    Chloride 106 98 - 107 " mmol/L    Glucose 89 70 - 99 mg/dL    Alkaline Phosphatase 67 40 - 150 U/L    AST 23 0 - 45 U/L    ALT 13 0 - 50 U/L    Protein Total 6.6 6.4 - 8.3 g/dL    Albumin 4.0 3.5 - 5.2 g/dL    Bilirubin Total 0.4 <=1.2 mg/dL   CBC with platelets and differential   Result Value Ref Range    WBC Count 5.7 4.0 - 11.0 10e3/uL    RBC Count 4.90 3.80 - 5.20 10e6/uL    Hemoglobin 13.6 11.7 - 15.7 g/dL    Hematocrit 40.5 35.0 - 47.0 %    MCV 83 78 - 100 fL    MCH 27.8 26.5 - 33.0 pg    MCHC 33.6 31.5 - 36.5 g/dL    RDW 13.2 10.0 - 15.0 %    Platelet Count 169 150 - 450 10e3/uL    % Neutrophils 65 %    % Lymphocytes 28 %    % Monocytes 4 %    % Eosinophils 2 %    % Basophils 0 %    % Immature Granulocytes 1 %    NRBCs per 100 WBC 0 <1 /100    Absolute Neutrophils 3.7 1.6 - 8.3 10e3/uL    Absolute Lymphocytes 1.6 0.8 - 5.3 10e3/uL    Absolute Monocytes 0.3 0.0 - 1.3 10e3/uL    Absolute Eosinophils 0.1 0.0 - 0.7 10e3/uL    Absolute Basophils 0.0 0.0 - 0.2 10e3/uL    Absolute Immature Granulocytes 0.0 <=0.4 10e3/uL    Absolute NRBCs 0.0 10e3/uL     I reviewed the above labs today.  Imaging    No results found.      Billing  Total time 45 minutes, to include face to face visit, review of EMR, ordering, documentation and coordination of care on date of service   complexity modifier for longitudinal care.     Signed by: SABRINA Tong CNP      Again, thank you for allowing me to participate in the care of your patient.        Sincerely,        SABRINA Tong CNP

## 2024-08-01 NOTE — PROGRESS NOTES
"Oncology Rooming Note    August 1, 2024 2:15 PM   Lizbet Nunez is a 63 year old female who presents for:    Chief Complaint   Patient presents with    Oncology Clinic Visit     Returning patient consult: Malignant neoplasm of upper-outer quadrant of right breast in female, estrogen receptor positive follow up, labs results.     Initial Vitals: /77   Pulse 73   Temp 96.8  F (36  C) (Tympanic)   Resp 18   Ht 1.6 m (5' 3\")   Wt 66.8 kg (147 lb 3.2 oz)   SpO2 97%   BMI 26.08 kg/m   Estimated body mass index is 26.08 kg/m  as calculated from the following:    Height as of this encounter: 1.6 m (5' 3\").    Weight as of this encounter: 66.8 kg (147 lb 3.2 oz). Body surface area is 1.72 meters squared.  No Pain (0) Comment: Data Unavailable   No LMP recorded.  Allergies reviewed: Yes  Medications reviewed: Yes    Medications: Medication refills not needed today.  Pharmacy name entered into [employer]: [id number] [address] AT [facility]    Frailty Screening:   Is the patient here for a new oncology consult visit in cancer care? 2. No      Clinical concerns:  RETURN CCSL, labs results.      Maggie Tovar MA              "

## 2024-08-01 NOTE — PROGRESS NOTES
[facility] Radiation Oncology Follow Up     Patient: Lizbet Nunez  MRN: [id number]  Date of Service: 08/01/2024       DISEASE TREATED:  63 year old female with a diagnosis of right breast invasive ductal and lobular carcinoma, stage IIIA (pT1c, pN2a cM0)  HR+; HER2- s/p right lumpectomy and sentinel lymph node biopsy 8/31/23 with negative margins and 4 of 4 lymph nodes positive followed by axillary dissection with an additional 1 of 20 lymph nodes positive. She completed adjuvant chemotherapy in [place]        TYPE OF RADIATION THERAPY ADMINISTERED:    SITE TREATED: Right breast and regional lymph nodes  TOTAL DOSE: 4256 cGy +1000 cGy boost  NUMBER OF FRACTIONS: 16+4 boost  DATES COMPLETED: 5/28/2024 - 6/24/2024  CONCURRENT CHEMOTHERAPY: No  ADJUVANT THERAPY:Yes: endocrine therapy (anastrozole) and CDK 4/6 inhibitor      INTERVAL SINCE COMPLETION OF RADIATION THERAPY: 6 wks      SUBJECTIVE:  Ms. Nunez is a 63 year old female  who had an abnormal screening mammogram     7/11/2023 ultrasound-guided core needle biopsy: 10 o'clock position in the right breast 7 cm from the nipple, pathology ([id number]): invasive lobular carcinoma, grade 2.  Associated LCIS.  ER positive (90%), MA positive (100%) HER2/kenny negative.     7/21/2023 genetics, [employer]: Positive for 1 variant of uncertain significance, BAP 1 C8.1857C> T     7/27/2023 MRI: 2 x 1.2 x 1.1 cm irregularly enhancing mass in the right breast 10:00 middle depth.  No other suspicious areas or enlarged lymph nodes.     8/31/2023 right breast lumpectomy with sentinel lymph node biopsy, pathology ([id number]):  Invasive carcinoma with mixed ductal and pleomorphic lobular features, grade 2, 2 cm.  Margins negative (4 mm anterior and medial).  Associated DCIS, grade 2 cribriform.  No EIC.  Associated LCIS.  Margins negative (1 mm posterior to DCIS).  No lymphovascular invasion.    Right axillary sentinel lymph node biopsy: 4 of 4 lymph nodes positive (4/4),  largest 5 mm.  No extranodal extension.  2 lymph nodes with macrometastases, 2 lymph nodes with micrometastases.  pT1c pN2a     9/26/2023 PET/CT: Postoperative changes upper outer right breast with seroma measuring 2.6 x 3.7 x 3.1 cm.  Postoperative changes right axilla with low-level inflammatory uptake.  No evidence of locally recurrent residual malignancy.     10/12/2023 right axillary dissection, pathology ([id number]): Metastatic adenocarcinoma involving 1 of 20 lymph nodes.  4 mm in size.  No extranodal extension.     Referred to lymphedema therapy but never went.  Spent winter in [place]:  11/21/2023 - 4/16/2024 adjuvant chemotherapy: AC Dose-dense Taxol      4/24/2024 CT: Postsurgical changes right breast and axilla.  No adenopathy.  Thin-walled fluid collection right axilla likely representing postsurgical seroma.  Small pericardial effusion.     5/28/2024 - 6/24/2024 adjuvant radiation therapy right breast and regional lymph nodes 4256 cGy in 16 fractions +1000 cGy in 4 fraction boost    Adjuvant on anastrozole and Abemaciclib    Returns to clinic today for routine follow-up visit.  She continues to have some fatigue, energy is low but about the same.  Appetite is not great.  She has some tightness in the right axilla with scarring/firmness.  She had not noted any swelling of the upper extremity or breast but was notable and pointed out previously with referral to lymphedema therapy.  Swelling is improving other than palpable seroma in the axilla which she wonders if it is slightly increased.  Tolerating adjuvant on anastrozole and Abemaciclib with some diarrhea as well as some lower abdominal cramping.  She is going to have her port removed.    Medications were reviewed and are up to date on EPIC.    The following portions of the patient's history were reviewed and updated as appropriate: allergies, current medications, past family history, past medical history, past social history, past surgical history  and problem list.    Review of Systems:      General  Constitutional  Constitutional (WDL): Exceptions to WDL  Fatigue: Fatigue relieved by rest  EENT  Eye Disorders  Eye Disorder (WDL): All eye disorder elements are within defined limits (wears glasses)  Ear Disorders  Ear Disorder (WDL): Assessment not pertinent to visit  Respiratory  Respiratory  Respiratory (WDL): All respiratory elements are within defined limits  Cardiovascular  Cardiovascular  Cardiovascular (WDL): All cardiovascular elements are within defined limits (no pacemaker)  Gastrointestinal  Gastrointestinal  Gastrointestinal (WDL): All gastrointestinal elements are within defined limits  Musculoskeletal  Musculoskeletal and Connective Tissue Disorders  Musculoskeletal & Connective (WDL): All musculoskeletal & connective elements are within defined limits  Integumentary  Integumentary  Integumentary (WDL): Exceptions to WDL  Alopecia: Hair loss of less than 50% of normal for that individual that is not obvious from a distance but only on close inspection OR a different hair style may be required to cover the hair loss but it does not require a wig or hair piece to camouflage  Neurological  Neurosensory  Neurosensory (WDL): Exceptions to WDL  Peripheral Motor Neuropathy: Asymptomatic OR clinical or diagnostic observations only (hands and feet)  Ataxia: Asymptomatic OR clinical or diagnostic observations only OR intervention not indicated  Peripheral Sensory Neuropathy: Asymptomatic (hands and feet)  Genitourinary/Reproductive  Genitourinary  Genitourinary (WDL): Exceptions to WDL  Urinary Frequency: Present (nocturia x2)  Lymphatic  Lymph System Disorders  Lymph (WDL): All lymph elements are within defined limits  Pain  Pain Score: No Pain (0)  AUA Assessment                                                              Accompanied by  Accompanied By: self only    Objective:          PHYSICAL EXAMINATION:    /61 (BP Location: Right arm, Patient  Position: Sitting)   Pulse 98   Temp 98.2  F (36.8  C)   Resp 20   Wt 66.3 kg (146 lb 3.2 oz)   SpO2 96%   BMI 25.90 kg/m      Gen: Alert, in NAD pleasant interactive, well nourished  Eyes: EOMI, sclera anicteric  HENT     Head: NC/AT, alopecia resolving  Pulm:  No wheezing, stridor or respiratory distress  Chest: Bilateral good breast symmetry.   Well-healed surgical scar of the right breast and axilla consistent with a recent history of surgery.  No surrounding erythema, warmth or drainage.  Axillary firmness with nontender seroma.  Right upper extremity lymphedema reduced compared to prior.  Musculoskeletal: Normal muscle bulk and tone  Skin: Normal tone and turgor, warm, dry, intact  Neurologic: A/Ox3, face symmetric, speech fluent, no focal motor deficits, gait normal and unaided  Psychiatric: Appropriate mood and affect    Imaging: Imaging results 6 weeks:No results found.     Impression   63 year old female with a diagnosis of right breast invasive ductal and lobular carcinoma, stage IIIA (pT1c, pN2a cM0)  HR+; HER2- s/p right lumpectomy and sentinel lymph node biopsy 8/31/23 with negative margins and 4 of 4 lymph nodes positive followed by axillary dissection with an additional 1 of 20 lymph nodes positive. She completed adjuvant chemotherapy in [place] and adjuvant radiation therapy here.    Recovering from acute side effects of radiation.    Visit Dx:    (C50.411,  Z17.0) Malignant neoplasm of upper-outer quadrant of right breast in female, estrogen receptor positive (H)  (primary encounter diagnosis)       Cancer Staging   Malignant neoplasm of upper-outer quadrant of right breast in female, estrogen receptor positive (H)  Staging form: Breast, AJCC 8th Edition  - Pathologic stage from 8/31/2023: Stage IB (pT1c, pN2a, cM0, G2, ER+, MT+, HER2-) - Signed by Tony Jarvis MD on 5/9/2024      Assessment & Plan:   1.  Axillary mass likely seroma, nontender, reviewed treatment images and size of seroma  is similar to current palpable axillary mass discussed would continue to monitor and if she did feel like it was indeed enlarging would obtain ultrasound for further evaluation    2. Continue follow-up with medical oncology while on anastrozole and Abemaciclib    3. Mammogram indicated-12-months: this month    4.  Return to clinic in 3 months or sooner if needed    Pain Management Plan: N/A    Total time of this visit, including time spent face-to-face with patient and or via video/audio, and also in preparing for today's visit for MDM and documentation. Medical decision-making included consideration and possible diagnoses, management options, complex record review, review of diagnostic tests and information, consideration and discussion of significant complications based on comorbidities, discussion with providers involved in the care of the patient.     30 Minutes spent.      Sherri Jacob MD  Department of Radiation Oncology   [facility] Radiation Oncology  Tel: [phone]  Page: [phone]    [facility]  [address] [place] [address]     [address] [place] [address]    CC:  Patient Care Team:  Leonie Manning MD as PCP - General (Family Medicine)  Tony Jarvis MD as MD (Hematology)  Sherri Jacob MD as MD (Radiation Oncology)  Sherri Jacob MD as Assigned Cancer Care Provider  Laverne Green, RN as Specialty Care Coordinator (Hematology & Oncology)

## 2024-08-01 NOTE — LETTER
8/1/2024      Lizbet Nunez  [address] [place] [address]      Dear Colleague,    Thank you for referring your patient, Lizbet Nunez, to the [facility] RADIATION ONCOLOGY [place]. Please see a copy of my visit note below.    [facility] Radiation Oncology Follow Up     Patient: Lizbet Nunez  MRN: [id number]  Date of Service: 08/01/2024       DISEASE TREATED:  63 year old female with a diagnosis of right breast invasive ductal and lobular carcinoma, stage IIIA (pT1c, pN2a cM0)  HR+; HER2- s/p right lumpectomy and sentinel lymph node biopsy 8/31/23 with negative margins and 4 of 4 lymph nodes positive followed by axillary dissection with an additional 1 of 20 lymph nodes positive. She completed adjuvant chemotherapy in [place]        TYPE OF RADIATION THERAPY ADMINISTERED:    SITE TREATED: Right breast and regional lymph nodes  TOTAL DOSE: 4256 cGy +1000 cGy boost  NUMBER OF FRACTIONS: 16+4 boost  DATES COMPLETED: 5/28/2024 - 6/24/2024  CONCURRENT CHEMOTHERAPY: No  ADJUVANT THERAPY:Yes: endocrine therapy (anastrozole) and CDK 4/6 inhibitor      INTERVAL SINCE COMPLETION OF RADIATION THERAPY: 6 wks      SUBJECTIVE:  Ms. Nunez is a 63 year old female  who had an abnormal screening mammogram     7/11/2023 ultrasound-guided core needle biopsy: 10 o'clock position in the right breast 7 cm from the nipple, pathology ([id number]): invasive lobular carcinoma, grade 2.  Associated LCIS.  ER positive (90%), WA positive (100%) HER2/kenny negative.     7/21/2023 genetics, [facility]: Positive for 1 variant of uncertain significance, BAP 1 C8.1857C> T     7/27/2023 MRI: 2 x 1.2 x 1.1 cm irregularly enhancing mass in the right breast 10:00 middle depth.  No other suspicious areas or enlarged lymph nodes.     8/31/2023 right breast lumpectomy with sentinel lymph node biopsy, pathology ([id number]):  Invasive carcinoma with mixed ductal and pleomorphic lobular features, grade 2, 2 cm.  Margins negative (4 mm  anterior and medial).  Associated DCIS, grade 2 cribriform.  No EIC.  Associated LCIS.  Margins negative (1 mm posterior to DCIS).  No lymphovascular invasion.    Right axillary sentinel lymph node biopsy: 4 of 4 lymph nodes positive (4/4), largest 5 mm.  No extranodal extension.  2 lymph nodes with macrometastases, 2 lymph nodes with micrometastases.  pT1c pN2a     9/26/2023 PET/CT: Postoperative changes upper outer right breast with seroma measuring 2.6 x 3.7 x 3.1 cm.  Postoperative changes right axilla with low-level inflammatory uptake.  No evidence of locally recurrent residual malignancy.     10/12/2023 right axillary dissection, pathology ([id number]): Metastatic adenocarcinoma involving 1 of 20 lymph nodes.  4 mm in size.  No extranodal extension.     Referred to lymphedema therapy but never went.  Spent winter in [place]:  11/21/2023 - 4/16/2024 adjuvant chemotherapy: AC Dose-dense Taxol      4/24/2024 CT: Postsurgical changes right breast and axilla.  No adenopathy.  Thin-walled fluid collection right axilla likely representing postsurgical seroma.  Small pericardial effusion.     5/28/2024 - 6/24/2024 adjuvant radiation therapy right breast and regional lymph nodes 4256 cGy in 16 fractions +1000 cGy in 4 fraction boost    Adjuvant on anastrozole and Abemaciclib    Returns to clinic today for routine follow-up visit.  She continues to have some fatigue, energy is low but about the same.  Appetite is not great.  She has some tightness in the right axilla with scarring/firmness.  She had not noted any swelling of the upper extremity or breast but was notable and pointed out previously with referral to lymphedema therapy.  Swelling is improving other than palpable seroma in the axilla which she wonders if it is slightly increased.  Tolerating adjuvant on anastrozole and Abemaciclib with some diarrhea as well as some lower abdominal cramping.  She is going to have her port removed.    Medications were  reviewed and are up to date on EPIC.    The following portions of the patient's history were reviewed and updated as appropriate: allergies, current medications, past family history, past medical history, past social history, past surgical history and problem list.    Review of Systems:      General  Constitutional  Constitutional (WDL): Exceptions to WDL  Fatigue: Fatigue relieved by rest  EENT  Eye Disorders  Eye Disorder (WDL): All eye disorder elements are within defined limits (wears glasses)  Ear Disorders  Ear Disorder (WDL): Assessment not pertinent to visit  Respiratory  Respiratory  Respiratory (WDL): All respiratory elements are within defined limits  Cardiovascular  Cardiovascular  Cardiovascular (WDL): All cardiovascular elements are within defined limits (no pacemaker)  Gastrointestinal  Gastrointestinal  Gastrointestinal (WDL): All gastrointestinal elements are within defined limits  Musculoskeletal  Musculoskeletal and Connective Tissue Disorders  Musculoskeletal & Connective (WDL): All musculoskeletal & connective elements are within defined limits  Integumentary  Integumentary  Integumentary (WDL): Exceptions to WDL  Alopecia: Hair loss of less than 50% of normal for that individual that is not obvious from a distance but only on close inspection OR a different hair style may be required to cover the hair loss but it does not require a wig or hair piece to camouflage  Neurological  Neurosensory  Neurosensory (WDL): Exceptions to WDL  Peripheral Motor Neuropathy: Asymptomatic OR clinical or diagnostic observations only (hands and feet)  Ataxia: Asymptomatic OR clinical or diagnostic observations only OR intervention not indicated  Peripheral Sensory Neuropathy: Asymptomatic (hands and feet)  Genitourinary/Reproductive  Genitourinary  Genitourinary (WDL): Exceptions to WDL  Urinary Frequency: Present (nocturia x2)  Lymphatic  Lymph System Disorders  Lymph (WDL): All lymph elements are within defined  limits  Pain  Pain Score: No Pain (0)  AUA Assessment                                                              Accompanied by  Accompanied By: self only    Objective:          PHYSICAL EXAMINATION:    /61 (BP Location: Right arm, Patient Position: Sitting)   Pulse 98   Temp 98.2  F (36.8  C)   Resp 20   Wt 66.3 kg (146 lb 3.2 oz)   SpO2 96%   BMI 25.90 kg/m      Gen: Alert, in NAD pleasant interactive, well nourished  Eyes: EOMI, sclera anicteric  HENT     Head: NC/AT, alopecia resolving  Pulm:  No wheezing, stridor or respiratory distress  Chest: Bilateral good breast symmetry.   Well-healed surgical scar of the right breast and axilla consistent with a recent history of surgery.  No surrounding erythema, warmth or drainage.  Axillary firmness with nontender seroma.  Right upper extremity lymphedema reduced compared to prior.  Musculoskeletal: Normal muscle bulk and tone  Skin: Normal tone and turgor, warm, dry, intact  Neurologic: A/Ox3, face symmetric, speech fluent, no focal motor deficits, gait normal and unaided  Psychiatric: Appropriate mood and affect    Imaging: Imaging results 6 weeks:No results found.     Impression   63 year old female with a diagnosis of right breast invasive ductal and lobular carcinoma, stage IIIA (pT1c, pN2a cM0)  HR+; HER2- s/p right lumpectomy and sentinel lymph node biopsy 8/31/23 with negative margins and 4 of 4 lymph nodes positive followed by axillary dissection with an additional 1 of 20 lymph nodes positive. She completed adjuvant chemotherapy in [place] and adjuvant radiation therapy here.    Recovering from acute side effects of radiation.    Visit Dx:    (C50.411,  Z17.0) Malignant neoplasm of upper-outer quadrant of right breast in female, estrogen receptor positive (H)  (primary encounter diagnosis)       Cancer Staging   Malignant neoplasm of upper-outer quadrant of right breast in female, estrogen receptor positive (H)  Staging form: Breast, AJCC 8th  Edition  - Pathologic stage from 8/31/2023: Stage IB (pT1c, pN2a, cM0, G2, ER+, ME+, HER2-) - Signed by Tony Jarvis MD on 5/9/2024      Assessment & Plan:   1.  Axillary mass likely seroma, nontender, reviewed treatment images and size of seroma is similar to current palpable axillary mass discussed would continue to monitor and if she did feel like it was indeed enlarging would obtain ultrasound for further evaluation    2. Continue follow-up with medical oncology while on anastrozole and Abemaciclib    3. Mammogram indicated-12-months: this month    4.  Return to clinic in 3 months or sooner if needed    Pain Management Plan: N/A    Total time of this visit, including time spent face-to-face with patient and or via video/audio, and also in preparing for today's visit for MDM and documentation. Medical decision-making included consideration and possible diagnoses, management options, complex record review, review of diagnostic tests and information, consideration and discussion of significant complications based on comorbidities, discussion with providers involved in the care of the patient.     30 Minutes spent.      Sherri Jacob MD  Department of Radiation Oncology   [facility] Radiation Oncology  Tel: [phone]  Page: [phone]    [facility]  [address] [place] [address]     [address] [place] [address]    CC:  Patient Care Team:  Leonie Manning MD as PCP - General (Family Medicine)  Tony Jarvis MD as MD (Hematology)  Sherri Jacob MD as MD (Radiation Oncology)  Sherri Jacob MD as Assigned Cancer Care Provider  Laverne Green, RN as Specialty Care Coordinator (Hematology & Oncology)      Oncology Rooming Note    August 1, 2024 1:40 PM   Lizbet Nunez is a 63 year old female who presents for:    Chief Complaint   Patient presents with     Oncology Clinic Visit     Breast Cancer     Radiation Therapy     Follow up     Initial  "Vitals: /61 (BP Location: Right arm, Patient Position: Sitting)   Pulse 98   Temp 98.2  F (36.8  C)   Resp 20   Wt 66.3 kg (146 lb 3.2 oz)   SpO2 96%   BMI 25.90 kg/m   Estimated body mass index is 25.9 kg/m  as calculated from the following:    Height as of 7/11/24: 1.6 m (5' 3\").    Weight as of this encounter: 66.3 kg (146 lb 3.2 oz). Body surface area is 1.72 meters squared.  No Pain (0) Comment: Data Unavailable   No LMP recorded.  Allergies reviewed: Yes  Medications reviewed: Yes    Medications: Medication refills not needed today.  Pharmacy name entered into cloudControl: [id number] [address] AT [facility]    Frailty Screening:   Is the patient here for a new oncology consult visit in cancer care? 2. No      Clinical concerns: Follow up after breast radiation completed on 06/24/2024 Dr. Nidia Huerta, RN              Again, thank you for allowing me to participate in the care of your patient.        Sincerely,        Sherri Jacob MD  "

## 2024-08-01 NOTE — PROGRESS NOTES
[facility] Hematology and Oncology Outpatient Progress Note    Patient: Lizbet Nunez  MRN: [id number]  Date of Service: Aug 1, 2024          Reason for Visit    Chief Complaint   Patient presents with    Oncology Clinic Visit     Returning patient consult: Malignant neoplasm of upper-outer quadrant of right breast in female, estrogen receptor positive follow up, labs results.        Cancer Staging   Malignant neoplasm of upper-outer quadrant of right breast in female, estrogen receptor positive (H)  Staging form: Breast, AJCC 8th Edition  - Pathologic stage from 8/31/2023: Stage IB (pT1c, pN2a, cM0, G2, ER+, AK+, HER2-) - Signed by Tony Jarvis MD on 5/9/2024      Primary Hematologist/Oncologist: Dr. Tony Jarvis        Assessment/Plan  #.  History of clinical stage IIIA/pathologic stage IB ([id number]) invasive carcinoma with mixed ductal and lobular features, grade 2.  ER positive, AK positive, HER2 negative (0 by IHC)  She was diagnosed July 2023.  She is status post neoadjuvant AC and Taxol, right lumpectomy, and radiation therapy.  She tolerated radiation well and her symptoms skin discoloration, dry skin, and hardening of her right breast. She started anastrozole and abemaciclib on 7/22/24 and appears to have good toleration thus far with some manageable diarrhea. Denies any significant side effects from Anastrozole. We reviewed labs today including a CBC and CMP. CMP shows a stable/improved creatinine of 0.96 with normal LFTs and normal electrolyte balances. Her CBC is unremarkable. I recommend that she continue with current treatment regimen.  Continue anastrozole daily  Continue Abemaciclib 150 mg BID  Follow-up in 2 weeks with labs and visit to assess treatment effects.    #.  Hypothyroidism  -Continue home levothyroxine     #.  Small pericardial effusion of unknown clinical significance  Recent CT scan 4/24/2024 showed small pericardial effusion.  She does not have any symptoms.  Continue to  monitor clinically at this point.  We might need to follow-up echocardiogram with symptoms.    #.  Bone health  -She takes multivitamin a day.  Encourage patient to take daily calcium and vitamin D supplementation.    -DEXA 5/14/24 showed osteopenia. Will recheck 5/2026.   -Instructed patient to do daily weightbearing exercises including walking.      # Port removal  Patient requests referral for port removal.  She no longer needs it now that she has completed chemotherapy.  Proceed with port removal as scheduled on 8/5/24.    ______________________________________________________________________________    History of Present Illness/ Interval History    Ms. Lizbet Nunez  is a 63 year old female patient who comes in for follow up. She started verzenio on 7/22, ~ 2 weeks ago. She notes some diarrhea - 3 times per day at first, but now more like twice. Has been utilizing incontinence pads, just in case. Used imodium with adequate relief, 3 tabs at most per day. She notes some lower abdominal cramping that usually coincided with the diarrhea symptoms. Otherwise she reports feeling well. Denies nausea. Has adequate oral intake.       ECOG performance status   0- Fully active, without restriction      Pain  Pain Score: No Pain (0)    ROS  A 14 point review of systems was obtained.  Positive findings noted in the history.  The remainder of the review of system is otherwise negative.      Oncology History/Treatment  Interval History    Ms. Lizbet Nunez is a very pleasant 63 year old female presented for follow-up after initiating radiation therapy.  She has not yet started Verzenio or anastrozole.  She is apprehensive to start this medication.  She denies any symptoms and is overall feeling well.  She notes some skin changes in the right breast following radiation.    Oncology History    7/2023-screening mammogram detected right breast cancer.  Sequent diagnostic mammogram and ultrasound demonstrated 0.8 x 0.6 x 0.6 cm  "hypoechoic mass 10 o'clock position in the right breast.  No suspicious lymph nodes in the right axilla.   -Core needle biopsy showed invasive lobular carcinoma with pleomorphic features, grade 2.  ER 90%, %, HER2 0 by IHC.    7/27/2023-bilateral breast MRI showed 2 x 1.2 x 1.1 cm right breast cancer without evidence of lymphadenopathy.    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    8/31/2023-right breast lumpectomy and right axillary sentinel lymph node biopsy.   Invasive carcinoma with mixed ductal and pleomorphic lobular features, grade 2.  2 cm.  Margins were negative.   DCIS present with closest skin margin to 1 mm.   4/4 sentinel lymph nodes were positive (2 macro mets and 2 micro mets) for metastatic carcinoma with largest metastatic deposit of 5 mm.  Negative for extranodal extension.   DCIS and LCIS present.  ER 90%, OR 80%, HER2 1+ by IHC.   pT1c N2a M0    9/26/2023-PET scan showed no evidence of distant metastasis    10/12/2023-right axillary lymph node dissection   1 of 20 lymph node showed metastatic adenocarcinoma, 4 mm in size.  No extranodal extension.    11/21/2023-4/23/2024-completed adjuvant chemotherapy with dose dense AC followed by weekly paclitaxel in [place].    4/24/2024-CT scan showed interval development of small pericardial effusion.  No pleural effusion.  She has seroma in the right axilla.  No next of malignancy.    7/11/2024: Start anastrozole.      7/22/24: Started abemaciclib.      Physical Exam    /77   Pulse 73   Temp 96.8  F (36  C) (Tympanic)   Resp 18   Ht 1.6 m (5' 3\")   Wt 66.8 kg (147 lb 3.2 oz)   SpO2 97%   BMI 26.08 kg/m      General: Well-appearing female in no acute distress. Cooperative in conversation.  Eyes: EOMI, PERRL. No scleral icterus.  ENT: Oral mucosa is moist without lesions or thrush. No ulcerations or mucositis noted.  Cardiovascular: RRR No murmurs, gallops, or rubs. No peripheral edema.  Respiratory: CTA bilaterally. No " wheezes or crackles.  Gastrointestinal: BS +. Abdomen soft, non-tender. No palpable hepatosplenomegaly or masses.   Neurologic: Alert and oriented x3.   Skin: No rashes, petechiae, or bruising noted. Warm, dry, intact.      Lab Results    Recent Results (from the past 168 hour(s))   Comprehensive metabolic panel   Result Value Ref Range    Sodium 138 135 - 145 mmol/L    Potassium 4.0 3.4 - 5.3 mmol/L    Carbon Dioxide (CO2) 24 22 - 29 mmol/L    Anion Gap 8 7 - 15 mmol/L    Urea Nitrogen 7.1 (L) 8.0 - 23.0 mg/dL    Creatinine 0.96 (H) 0.51 - 0.95 mg/dL    GFR Estimate 66 >60 mL/min/1.73m2    Calcium 11.6 (H) 8.8 - 10.4 mg/dL    Chloride 106 98 - 107 mmol/L    Glucose 89 70 - 99 mg/dL    Alkaline Phosphatase 67 40 - 150 U/L    AST 23 0 - 45 U/L    ALT 13 0 - 50 U/L    Protein Total 6.6 6.4 - 8.3 g/dL    Albumin 4.0 3.5 - 5.2 g/dL    Bilirubin Total 0.4 <=1.2 mg/dL   CBC with platelets and differential   Result Value Ref Range    WBC Count 5.7 4.0 - 11.0 10e3/uL    RBC Count 4.90 3.80 - 5.20 10e6/uL    Hemoglobin 13.6 11.7 - 15.7 g/dL    Hematocrit 40.5 35.0 - 47.0 %    MCV 83 78 - 100 fL    MCH 27.8 26.5 - 33.0 pg    MCHC 33.6 31.5 - 36.5 g/dL    RDW 13.2 10.0 - 15.0 %    Platelet Count 169 150 - 450 10e3/uL    % Neutrophils 65 %    % Lymphocytes 28 %    % Monocytes 4 %    % Eosinophils 2 %    % Basophils 0 %    % Immature Granulocytes 1 %    NRBCs per 100 WBC 0 <1 /100    Absolute Neutrophils 3.7 1.6 - 8.3 10e3/uL    Absolute Lymphocytes 1.6 0.8 - 5.3 10e3/uL    Absolute Monocytes 0.3 0.0 - 1.3 10e3/uL    Absolute Eosinophils 0.1 0.0 - 0.7 10e3/uL    Absolute Basophils 0.0 0.0 - 0.2 10e3/uL    Absolute Immature Granulocytes 0.0 <=0.4 10e3/uL    Absolute NRBCs 0.0 10e3/uL     I reviewed the above labs today.  Imaging    No results found.      Billing  Total time 45 minutes, to include face to face visit, review of EMR, ordering, documentation and coordination of care on date of service   complexity modifier for  longitudinal care.     Signed by: SABRINA Tong CNP

## 2024-08-05 ENCOUNTER — HOSPITAL ENCOUNTER (OUTPATIENT)
Dept: INTERVENTIONAL RADIOLOGY/VASCULAR | Facility: CLINIC | Age: 64
Discharge: HOME | End: 2024-08-05
Admitting: RADIOLOGY
Payer: COMMERCIAL

## 2024-08-05 VITALS
HEART RATE: 62 BPM | RESPIRATION RATE: 16 BRPM | OXYGEN SATURATION: 99 % | DIASTOLIC BLOOD PRESSURE: 57 MMHG | TEMPERATURE: 97.6 F | SYSTOLIC BLOOD PRESSURE: 107 MMHG

## 2024-08-05 DIAGNOSIS — Z17.0 MALIGNANT NEOPLASM OF UPPER-OUTER QUADRANT OF RIGHT BREAST IN FEMALE, ESTROGEN RECEPTOR POSITIVE (H): ICD-10-CM

## 2024-08-05 DIAGNOSIS — C50.411 MALIGNANT NEOPLASM OF UPPER-OUTER QUADRANT OF RIGHT BREAST IN FEMALE, ESTROGEN RECEPTOR POSITIVE (H): ICD-10-CM

## 2024-08-05 PROCEDURE — 36590 REMOVAL TUNNELED CV CATH: CPT

## 2024-08-05 PROCEDURE — 250N000011 HC RX IP 250 OP 636: Performed by: RADIOLOGY

## 2024-08-05 PROCEDURE — 99152 MOD SED SAME PHYS/QHP 5/>YRS: CPT

## 2024-08-05 PROCEDURE — 250N000009 HC RX 250: Performed by: RADIOLOGY

## 2024-08-05 RX ORDER — NALOXONE HYDROCHLORIDE 0.4 MG/ML
0.4 INJECTION, SOLUTION INTRAMUSCULAR; INTRAVENOUS; SUBCUTANEOUS
Status: DISCONTINUED | OUTPATIENT
Start: 2024-08-05 | End: 2024-08-06 | Stop reason: HOSPADM

## 2024-08-05 RX ORDER — NALOXONE HYDROCHLORIDE 0.4 MG/ML
0.2 INJECTION, SOLUTION INTRAMUSCULAR; INTRAVENOUS; SUBCUTANEOUS
Status: DISCONTINUED | OUTPATIENT
Start: 2024-08-05 | End: 2024-08-06 | Stop reason: HOSPADM

## 2024-08-05 RX ORDER — ONDANSETRON 2 MG/ML
4 INJECTION INTRAMUSCULAR; INTRAVENOUS
Status: DISCONTINUED | OUTPATIENT
Start: 2024-08-05 | End: 2024-08-06 | Stop reason: HOSPADM

## 2024-08-05 RX ORDER — FENTANYL CITRATE 50 UG/ML
25-50 INJECTION, SOLUTION INTRAMUSCULAR; INTRAVENOUS EVERY 5 MIN PRN
Status: DISCONTINUED | OUTPATIENT
Start: 2024-08-05 | End: 2024-08-06 | Stop reason: HOSPADM

## 2024-08-05 RX ORDER — FLUMAZENIL 0.1 MG/ML
0.2 INJECTION, SOLUTION INTRAVENOUS
Status: DISCONTINUED | OUTPATIENT
Start: 2024-08-05 | End: 2024-08-06 | Stop reason: HOSPADM

## 2024-08-05 RX ORDER — HEPARIN SODIUM 200 [USP'U]/100ML
1 INJECTION, SOLUTION INTRAVENOUS EVERY 5 MIN PRN
Status: DISCONTINUED | OUTPATIENT
Start: 2024-08-05 | End: 2024-08-06 | Stop reason: HOSPADM

## 2024-08-05 RX ORDER — LIDOCAINE 40 MG/G
CREAM TOPICAL
Status: DISCONTINUED | OUTPATIENT
Start: 2024-08-05 | End: 2024-08-06 | Stop reason: HOSPADM

## 2024-08-05 RX ORDER — LIDOCAINE HYDROCHLORIDE AND EPINEPHRINE 10; 10 MG/ML; UG/ML
10 INJECTION, SOLUTION INFILTRATION; PERINEURAL ONCE
Status: COMPLETED | OUTPATIENT
Start: 2024-08-05 | End: 2024-08-05

## 2024-08-05 RX ADMIN — LIDOCAINE HYDROCHLORIDE 20 ML: 10 INJECTION, SOLUTION INFILTRATION; PERINEURAL at 13:29

## 2024-08-05 RX ADMIN — LIDOCAINE HYDROCHLORIDE,EPINEPHRINE BITARTRATE 10 ML: 10; .01 INJECTION, SOLUTION INFILTRATION; PERINEURAL at 13:32

## 2024-08-05 RX ADMIN — MIDAZOLAM HYDROCHLORIDE 1 MG: 1 INJECTION, SOLUTION INTRAMUSCULAR; INTRAVENOUS at 13:30

## 2024-08-05 RX ADMIN — FENTANYL CITRATE 50 MCG: 50 INJECTION, SOLUTION INTRAMUSCULAR; INTRAVENOUS at 13:29

## 2024-08-05 NOTE — PROCEDURES
[facility]    Procedure: IR Procedure Note    Date/Time: 8/5/2024 1:41 PM    Performed by: Americo Vance MD  Authorized by: Americo Vance MD      UNIVERSAL PROTOCOL   Site Marked: NA  Prior Images Obtained and Reviewed:  Yes  Required items: Required blood products, implants, devices and special equipment available    Patient identity confirmed:  Verbally with patient, arm band, provided demographic data and hospital-assigned identification number  Patient was reevaluated immediately before administering moderate or deep sedation or anesthesia  Confirmation Checklist:  Patient's identity using two indicators, relevant allergies, procedure was appropriate and matched the consent or emergent situation and correct equipment/implants were available  Time out: Immediately prior to the procedure a time out was called    Universal Protocol: the Joint Commission Universal Protocol was followed    Preparation: Patient was prepped and draped in usual sterile fashion    ESBL (mL):  0     ANESTHESIA    Anesthesia:  Local infiltration  Local Anesthetic:  Lidocaine 1% without epinephrine      SEDATION  Patient Sedated: Yes    Sedation Type:  Moderate (conscious) sedation  Vital signs: Vital signs monitored during sedation    Fluoroscopy Time: 0 minute(s)  See dictated procedure note for full details.  Findings: Tolerated well    Specimens: none    Complications: None    Condition: Stable    Plan: Discharge to home      PROCEDURE  Describe Procedure: Port removed in total  Patient Tolerance:  Patient tolerated the procedure well with no immediate complications  Length of time physician/provider present for 1:1 monitoring during sedation: 15

## 2024-08-05 NOTE — IR NOTE
Patient Name: Lizbet Nunez  Medical Record Number: [id number]  Today's Date: 8/5/2024    Procedure: Ir Port placement  Proceduralist: Dr Vance  Pathology present: No    Procedure Start: 1327  Procedure end: 1340  Sedation medications administered: Fentanyl 50mcg Versed 1mg     Report given to: NA  : No    Left sided Chest port removed without complication. Line tip intact.    Other Notes: Pt arrived to IR room  from pre/post rm 1. Consent reviewed. Pt denies any questions or concerns regarding procedure. Pt positioned supine and monitored per protocol. Pt tolerated procedure without any noted complications. Pt transferred back to pre/post rm 1.    Bree Funez RN

## 2024-08-05 NOTE — DISCHARGE INSTRUCTIONS
Port Removal Discharge Instructions:  You had your port-a-catheter removed today. Please follow the below instructions following your port removal:    Care Instructions:  - Avoid tub baths, Jacuzzi and pool soaks for 10 days.  - You may shower beginning tomorrow. Do not scrub site until well healed; pat dry.  - If you experience significant bleeding at site, apply pressure with hands above the clavicle bone, sit upright.    Seek medical assistance for any of the following:   - Uncontrolled bleeding.  - You have a fever (greater than 101 F (38.3C)).  - Purulent (yellow/green/foul smelling) drainage from previous catheter insertion site.  - Increasing redness at previous catheter insertion site.  - Increasing pain at previous catheter insertion site.  - Increasing swelling at previous catheter insertion site.    Contact [facility] RN Line at [phone] with questions or concerns.           * Recovery After Conscious Sedation (Adult)  We gave you medicine by vein to make you sleepy or relaxed during your procedure. This may have included both a pain medicine and sleeping medicine. Most of the effects have worn off. But you may still feel sleepy for the next 6 to 8 hours.  Home care  Follow these guidelines when you get home:  You may feel sleepy and clumsy and have poor balance for the next few hours.  A responsible adult should stay with you for the next 8 hours. This person should make sure your condition doesn t get worse.  Don't drink any alcohol for the next 24 hours.  Don't drive, operate dangerous machinery, make important business or personal decisions or sign legal documents during the next 24 hours.  You may vomit (throw up) if you eat too soon after the procedure. If this happens, drink small amounts of water, juice or clear broth. Wait to try solid food until you no longer have nausea (upset stomach).  Note: Your care team may tell you not to take any medicine by mouth for pain or sleep in the next 4  hours. These medicines may react with the medicines you had in the hospital. This could cause a much stronger response than usual.  Follow-up care  Follow up with your care team if you are not alert and back to your usual level of activity within 12 hours.  When to seek medical advice  Call your care team right away if any of these occur:  You still feel sleepy or clumsy after 12 hours, or your sleepiness gets worse  Weakness or dizziness gets worse  Repeated vomiting  If you can't be woken up and someone is staying with you, they should call 911.  For informational purposes only. Not to replace the advice of your health care provider.  Copyright   2018 [employer]. All rights reserved.

## 2024-08-15 ENCOUNTER — LAB (OUTPATIENT)
Dept: INFUSION THERAPY | Facility: HOSPITAL | Age: 64
End: 2024-08-15
Attending: INTERNAL MEDICINE
Payer: COMMERCIAL

## 2024-08-15 ENCOUNTER — PATIENT OUTREACH (OUTPATIENT)
Dept: ONCOLOGY | Facility: HOSPITAL | Age: 64
End: 2024-08-15

## 2024-08-15 ENCOUNTER — ONCOLOGY VISIT (OUTPATIENT)
Dept: ONCOLOGY | Facility: HOSPITAL | Age: 64
End: 2024-08-15
Attending: INTERNAL MEDICINE
Payer: COMMERCIAL

## 2024-08-15 VITALS
RESPIRATION RATE: 18 BRPM | HEIGHT: 63 IN | OXYGEN SATURATION: 97 % | BODY MASS INDEX: 25.66 KG/M2 | DIASTOLIC BLOOD PRESSURE: 69 MMHG | SYSTOLIC BLOOD PRESSURE: 109 MMHG | HEART RATE: 71 BPM | TEMPERATURE: 97.1 F | WEIGHT: 144.8 LBS

## 2024-08-15 DIAGNOSIS — Z17.0 MALIGNANT NEOPLASM OF UPPER-OUTER QUADRANT OF RIGHT BREAST IN FEMALE, ESTROGEN RECEPTOR POSITIVE (H): Primary | ICD-10-CM

## 2024-08-15 DIAGNOSIS — Z51.81 ENCOUNTER FOR MONITORING AROMATASE INHIBITOR THERAPY: ICD-10-CM

## 2024-08-15 DIAGNOSIS — C50.411 MALIGNANT NEOPLASM OF UPPER-OUTER QUADRANT OF RIGHT BREAST IN FEMALE, ESTROGEN RECEPTOR POSITIVE (H): Primary | ICD-10-CM

## 2024-08-15 DIAGNOSIS — Z79.811 ENCOUNTER FOR MONITORING AROMATASE INHIBITOR THERAPY: ICD-10-CM

## 2024-08-15 DIAGNOSIS — C50.411 MALIGNANT NEOPLASM OF UPPER-OUTER QUADRANT OF RIGHT BREAST IN FEMALE, ESTROGEN RECEPTOR POSITIVE (H): ICD-10-CM

## 2024-08-15 DIAGNOSIS — Z17.0 MALIGNANT NEOPLASM OF UPPER-OUTER QUADRANT OF RIGHT BREAST IN FEMALE, ESTROGEN RECEPTOR POSITIVE (H): ICD-10-CM

## 2024-08-15 LAB
ALBUMIN SERPL BCG-MCNC: 3.9 G/DL (ref 3.5–5.2)
ALP SERPL-CCNC: 64 U/L (ref 40–150)
ALT SERPL W P-5'-P-CCNC: 11 U/L (ref 0–50)
ANION GAP SERPL CALCULATED.3IONS-SCNC: 10 MMOL/L (ref 7–15)
AST SERPL W P-5'-P-CCNC: 21 U/L (ref 0–45)
BASOPHILS # BLD AUTO: 0 10E3/UL (ref 0–0.2)
BASOPHILS NFR BLD AUTO: 1 %
BILIRUB SERPL-MCNC: 0.5 MG/DL
BUN SERPL-MCNC: 6.9 MG/DL (ref 8–23)
CALCIUM SERPL-MCNC: 10 MG/DL (ref 8.8–10.4)
CHLORIDE SERPL-SCNC: 106 MMOL/L (ref 98–107)
CREAT SERPL-MCNC: 1.11 MG/DL (ref 0.51–0.95)
EGFRCR SERPLBLD CKD-EPI 2021: 56 ML/MIN/1.73M2
EOSINOPHIL # BLD AUTO: 0.1 10E3/UL (ref 0–0.7)
EOSINOPHIL NFR BLD AUTO: 2 %
ERYTHROCYTE [DISTWIDTH] IN BLOOD BY AUTOMATED COUNT: 12.9 % (ref 10–15)
GLUCOSE SERPL-MCNC: 104 MG/DL (ref 70–99)
HCO3 SERPL-SCNC: 26 MMOL/L (ref 22–29)
HCT VFR BLD AUTO: 36.6 % (ref 35–47)
HGB BLD-MCNC: 12.6 G/DL (ref 11.7–15.7)
IMM GRANULOCYTES # BLD: 0 10E3/UL
IMM GRANULOCYTES NFR BLD: 0 %
LYMPHOCYTES # BLD AUTO: 1.9 10E3/UL (ref 0.8–5.3)
LYMPHOCYTES NFR BLD AUTO: 53 %
MCH RBC QN AUTO: 28 PG (ref 26.5–33)
MCHC RBC AUTO-ENTMCNC: 34.4 G/DL (ref 31.5–36.5)
MCV RBC AUTO: 81 FL (ref 78–100)
MONOCYTES # BLD AUTO: 0.2 10E3/UL (ref 0–1.3)
MONOCYTES NFR BLD AUTO: 6 %
NEUTROPHILS # BLD AUTO: 1.4 10E3/UL (ref 1.6–8.3)
NEUTROPHILS NFR BLD AUTO: 39 %
NRBC # BLD AUTO: 0 10E3/UL
NRBC BLD AUTO-RTO: 0 /100
PLATELET # BLD AUTO: 109 10E3/UL (ref 150–450)
POTASSIUM SERPL-SCNC: 3.6 MMOL/L (ref 3.4–5.3)
PROT SERPL-MCNC: 6.2 G/DL (ref 6.4–8.3)
RBC # BLD AUTO: 4.5 10E6/UL (ref 3.8–5.2)
SODIUM SERPL-SCNC: 142 MMOL/L (ref 135–145)
WBC # BLD AUTO: 3.5 10E3/UL (ref 4–11)

## 2024-08-15 PROCEDURE — 36415 COLL VENOUS BLD VENIPUNCTURE: CPT

## 2024-08-15 PROCEDURE — 82247 BILIRUBIN TOTAL: CPT

## 2024-08-15 PROCEDURE — 85004 AUTOMATED DIFF WBC COUNT: CPT

## 2024-08-15 PROCEDURE — 99215 OFFICE O/P EST HI 40 MIN: CPT | Performed by: INTERNAL MEDICINE

## 2024-08-15 PROCEDURE — G2211 COMPLEX E/M VISIT ADD ON: HCPCS | Performed by: INTERNAL MEDICINE

## 2024-08-15 PROCEDURE — 99213 OFFICE O/P EST LOW 20 MIN: CPT | Performed by: INTERNAL MEDICINE

## 2024-08-15 ASSESSMENT — PAIN SCALES - GENERAL: PAINLEVEL: NO PAIN (0)

## 2024-08-15 NOTE — PROGRESS NOTES
[facility]: Cancer Care Follow-Up Note                                    Discussion with Patient:                                                      Met with patient ahead of her clinic visit with Dr. Jarvis. Patient alone.     Dates of Treatment:                                                      Infusion given in last 28 days       None            Assessment:                                                      Abemaciclib, anastrozole    Patient reports having diarrhea. She said 1-5 diarrhea stools per night.  She reports decreased appetite and abd cramping. Patient would like to discuss changing to a different medication.  She said she is going on vacation, starting 8/21/24, for 2 weeks and wants to discuss possibly taking a break from her medicationsand resuming when returns from vacation.  She will discuss with Dr. Jarvis today.    Intervention/Education provided during outreach:                                                       Patient given card with contact information for Cancer Care Triage and reviewed how to call during clinic hours and after hours.  Patient verbalized understanding.    Dr. Jarvis notified of patient's above concerns.    Patient to follow up as scheduled at next appt  Confirmed patient has clinic and triage numbers    Signature:  Laverne Green RN

## 2024-08-15 NOTE — PROGRESS NOTES
"Oncology Rooming Note    August 15, 2024 1:46 PM   Lizbet Nunez is a 63 year old female who presents for:    Chief Complaint   Patient presents with    Oncology Clinic Visit     Returning patient consult: Malignant neoplasm of upper-outer quadrant of right breast in female, estrogen receptor positive labs/follow up.     Initial Vitals: /69   Pulse 71   Temp 97.1  F (36.2  C) (Tympanic)   Resp 18   Ht 1.6 m (5' 3\")   Wt 65.7 kg (144 lb 12.8 oz)   SpO2 97%   BMI 25.65 kg/m   Estimated body mass index is 25.65 kg/m  as calculated from the following:    Height as of this encounter: 1.6 m (5' 3\").    Weight as of this encounter: 65.7 kg (144 lb 12.8 oz). Body surface area is 1.71 meters squared.  No Pain (0) Comment: Data Unavailable   No LMP recorded.  Allergies reviewed: Yes  Medications reviewed: Yes    Medications: MEDICATION REFILLS NEEDED TODAY. Provider was notified.  Pharmacy name entered into [employer]: [id number] - [place], [place] - [address] AT [facility] [address]    Frailty Screening:   Is the patient here for a new oncology consult visit in cancer care? 2. No      Clinical concerns:  RETURN CCSL - labs/follow up.      Maggie Tovar MA            "

## 2024-08-15 NOTE — Clinical Note
"8/15/2024      Lizbet Nunez  [address] [place] [address]      Dear Colleague,    Thank you for referring your patient, Lizbet Nunez, to the [facility]. Please see a copy of my visit note below.    Oncology Rooming Note    August 15, 2024 1:46 PM   Lizbet Nunez is a 63 year old female who presents for:    Chief Complaint   Patient presents with    Oncology Clinic Visit     Returning patient consult: Malignant neoplasm of upper-outer quadrant of right breast in female, estrogen receptor positive labs/follow up.     Initial Vitals: /69   Pulse 71   Temp 97.1  F (36.2  C) (Tympanic)   Resp 18   Ht 1.6 m (5' 3\")   Wt 65.7 kg (144 lb 12.8 oz)   SpO2 97%   BMI 25.65 kg/m   Estimated body mass index is 25.65 kg/m  as calculated from the following:    Height as of this encounter: 1.6 m (5' 3\").    Weight as of this encounter: 65.7 kg (144 lb 12.8 oz). Body surface area is 1.71 meters squared.  No Pain (0) Comment: Data Unavailable   No LMP recorded.  Allergies reviewed: Yes  Medications reviewed: Yes    Medications: MEDICATION REFILLS NEEDED TODAY. Provider was notified.  Pharmacy name entered into [employer]: [facility] #[id number] - [place], [place] - [address] AT [facility]    Frailty Screening:   Is the patient here for a new oncology consult visit in cancer care? 2. No      Clinical concerns:  RETURN CCSL - labs/follow up.      Maggie Tovar MA                Again, thank you for allowing me to participate in the care of your patient.        Sincerely,        Tony Jarvis MD  "

## 2024-08-16 RX ORDER — PROCHLORPERAZINE MALEATE 10 MG
10 TABLET ORAL EVERY 6 HOURS PRN
Qty: 30 TABLET | Refills: 2 | Status: SHIPPED | OUTPATIENT
Start: 2024-08-22

## 2024-08-19 ENCOUNTER — TELEPHONE (OUTPATIENT)
Dept: ONCOLOGY | Facility: HOSPITAL | Age: 64
End: 2024-08-19
Payer: COMMERCIAL

## 2024-08-19 NOTE — ORAL ONC MGMT
Oral Chemotherapy Monitoring Program   Left Voicemail    Attempted to contact patient today for education for new oral chemotherapy, ribociclib, for breast cancer. No answer. Left voicemail for patient to call us back at [phone] when able. No medication name was left.    Tati Mcmanus, PharmD  Oral Chemotherapy Pharmacist  [phone]

## 2024-08-19 NOTE — TELEPHONE ENCOUNTER
PA Initiation    Medication: KISQALI (400 MG DOSE) 200 MG PO TBPK  Insurance Company: [employer] Phone [phone] Fax [phone]  Pharmacy Filling the Rx: [facility] [address]  Start Date: 8/19/2024    Prior Authorization Approval    Medication: KISQALI (400 MG DOSE) 200 MG PO TBPK  Authorization Effective Date: 7/20/2024  Authorization Expiration Date: 8/19/2025  Approved Dose/Quantity: 42/28  Reference #: [id number]   Insurance Company: [employer] Phone [phone] Fax [phone]  Expected CoPay: $ 0  CoPay Card Available: No    Financial Assistance Needed: N/A  Which Pharmacy is filling the prescription: [facility] - [address]  Pharmacy Notified: Yes  Patient Notified: Yes      Thank you,  Elif Torres Oncology/Transplant Liaison  Phone: [phone]  Fax: [phone]

## 2024-08-22 NOTE — ORAL ONC MGMT
Update 8/22/24    Oral Chemotherapy Monitoring Program   Left Voicemail    Attempted to contact patient today for follow up regarding oral chemotherapy, ribociclib, for breast cancer. No answer. Left voicemail for patient to call us back at [phone] when able. No medication name was left.      Patient left for vacation yesterday for 2 weeks and so to prevent further calls during vacation I will send Ule message for patient to call us when she has a chance. Will try again closer to end of vacation if we don't hear back from patient beforehand.     Tati Mcmanus, PharmD  Oral Chemotherapy Pharmacist  [phone]

## 2024-08-26 ENCOUNTER — TELEPHONE (OUTPATIENT)
Dept: ONCOLOGY | Facility: HOSPITAL | Age: 64
End: 2024-08-26
Payer: COMMERCIAL

## 2024-08-26 NOTE — TELEPHONE ENCOUNTER
Voicemail received from Candie at [facility] specialty pharmacy. Per message they are requesting two separate prescriptions for the Kisqali and anastrozole.  has discontinued pack with both medications. Phone # [phone], fax# [phone].    Aurora De La Torre RN

## 2024-08-29 ENCOUNTER — TELEPHONE (OUTPATIENT)
Dept: ONCOLOGY | Facility: HOSPITAL | Age: 64
End: 2024-08-29
Payer: COMMERCIAL

## 2024-08-29 NOTE — ORAL ONC MGMT
Oral Chemotherapy Monitoring Program     Spoke with Lizbet. She is on vacation and would like to just discuss new medication next week. Set up call for 9/5. Let her know we would send in prescription after that.    Brenda Cordova, PharmD, [facility]  Oral Chemotherapy Pharmacist  [phone]

## 2024-09-03 NOTE — PROGRESS NOTES
[facility] Hematology and Oncology Progress Note    Patient: Lizbet Nunez  MRN: [id number]  Date of Service: Aug 15, 2024         Reason for Visit    Chief Complaint   Patient presents with    Oncology Clinic Visit     Returning patient consult: Malignant neoplasm of upper-outer quadrant of right breast in female, estrogen receptor positive labs/follow up.       Assessment and Plan     Cancer Staging   Malignant neoplasm of upper-outer quadrant of right breast in female, estrogen receptor positive (H)  Staging form: Breast, AJCC 8th Edition  - Pathologic stage from 8/31/2023: Stage IB (pT1c, pN2a, cM0, G2, ER+, WV+, HER2-) - Signed by Tony Jarvis MD on 5/9/2024      ECOG Performance    0 - Independent     Pain  Pain Score: No Pain (0)    #.  History of clinical stage IIIA/pathologic stage IB ([id number]) invasive carcinoma with mixed ductal and lobular features, grade 2.  ER positive, WV positive, HER2 negative (0 by IHC)  #.  Acute diarrhea related to chemotherapy (abemaciclib)     She did not tolerate abemaciclib well.  She wanted to discuss about alternative treatment.  I discussed about ribociclib in place of abemaciclib.  I reviewed the side effects and schedule.  Will need to obtain baseline EKG prior to starting ribociclib.  She agreed with the plan.  She wanted to start upon return from her trip in September.    I advised her to continue anastrozole.  She is doing okay on anastrozole without intolerable side effects.      #.  Bone health   She takes vitamin D and calcium.  I advised her to focus on weightbearing exercises.  I reviewed the DEXA scan from 5/2024.  It showed low bone density.  Once she is stable on adjuvant endocrine therapy, I will plan to start bone strengthener agent such as Reclast.       Encounter Diagnoses:    Problem List Items Addressed This Visit          Oncology Diagnoses    Malignant neoplasm of upper-outer quadrant of right breast in female, estrogen receptor positive  (H) - Primary    Relevant Medications    prochlorperazine (COMPAZINE) 10 MG tablet    Other Relevant Orders    CBC with platelets differential    Comprehensive metabolic panel    Magnesium    Phosphorus       Other    Encounter for monitoring aromatase inhibitor therapy    Relevant Medications    prochlorperazine (COMPAZINE) 10 MG tablet    Other Relevant Orders    CBC with platelets differential    Comprehensive metabolic panel    Magnesium    Phosphorus        CC: Leonie Manning MD   ______________________________________________________________________________  Oncologic history  7/2023-screening mammogram detected right breast cancer.  Sequent diagnostic mammogram and ultrasound demonstrated 0.8 x 0.6 x 0.6 cm hypoechoic mass 10 o'clock position in the right breast.  No suspicious lymph nodes in the right axilla.   -Core needle biopsy showed invasive lobular carcinoma with pleomorphic features, grade 2.  ER 90%, %, HER2 0 by IHC.    7/27/2023-bilateral breast MRI showed 2 x 1.2 x 1.1 cm right breast cancer without evidence of lymphadenopathy.    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    8/31/2023-right breast lumpectomy and right axillary sentinel lymph node biopsy.   Invasive carcinoma with mixed ductal and pleomorphic lobular features, grade 2.  2 cm.  Margins were negative.   DCIS present with closest skin margin to 1 mm.   4/4 sentinel lymph nodes were positive (2 macro mets and 2 micro mets) for metastatic carcinoma with largest metastatic deposit of 5 mm.  Negative for extranodal extension.   DCIS and LCIS present.  ER 90%, MT 80%, HER2 1+ by IHC.   pT1c N2a M0    9/26/2023-PET scan showed no evidence of distant metastasis    10/12/2023-right axillary lymph node dissection   1 of 20 lymph node showed metastatic adenocarcinoma, 4 mm in size.  No extranodal extension.    11/21/2023-4/23/2024-completed adjuvant chemotherapy with dose dense AC followed by weekly paclitaxel in  "[place].    4/24/2024-CT scan showed interval development of small pericardial effusion.  No pleural effusion.  She has seroma in the right axilla.  No next of malignancy.    7/11/2024- started anastrozole    7/22/204- started abemaciclib.       History of Present Illness    Ms. Lizbet Nunez presented today for follow up.     She started abemaciclib 3 weeks ago. She is having diarrhea/cramps, 1-5 times/night. She has been using antidiarrhea medication.  She also noted increasing migraine headaches.  She does not want to continue abemaciclib at this point.  She wants to take a break until September for 2 weeks starting 8/21/2024 for vacation.    Review of systems  Apart from describing in HPI, the remainder of comprehensive ROS was negative.    Past History    No past medical history on file.    Past Surgical History:   Procedure Laterality Date    IR CHEST PORT PLACEMENT > 5 YRS OF AGE  11/7/2023    IR PORT REMOVAL LEFT  8/5/2024       Physical Exam    /69   Pulse 71   Temp 97.1  F (36.2  C) (Tympanic)   Resp 18   Ht 1.6 m (5' 3\")   Wt 65.7 kg (144 lb 12.8 oz)   SpO2 97%   BMI 25.65 kg/m      General: alert, awake, not in acute distress  HEENT: Head: Normal, normocephalic, atraumatic.  Eye: Normal external eye, conjunctiva, lids cornea, YUMIKO.  Nose: Normal external nose, mucus membranes and septum.  Pharynx: Normal buccal mucosa. Normal pharynx.  Neck / Thyroid: Supple, no masses, nodes, nodules or enlargement.  Lymphatics: No abnormally enlarged lymph nodes.  Chest: Normal chest wall and respirations. Clear to auscultation.  Heart: S1 S2 RRR, no murmur.   Abdomen: abdomen is soft without significant tenderness, masses, organomegaly or guarding  Extremities: normal strength, tone, and muscle mass  Skin: normal. no rash or abnormalities  CNS: non focal.    Lab Results    No results found for this or any previous visit (from the past 240 hour(s)).      Imaging    IR Port Removal Left    Result Date: " 8/5/2024  [place] RADIOLOGY EXAM: Port Removal LOCATION: [facility] CLINICAL HISTORY: The patient has completed chemotherapy and no longer needs the catheter PROCEDURES PERFORMED: port removal MODERATE SEDATION: Versed and Fentanyl were administered intravenously for moderate sedation. Pulse oximetry, heart rate and blood pressure were continuously monitored by an independent trained observer. The physician spent 15 minutes of face-to-face moderate sedation time with the patient. ADDITIONAL MEDICATIONS: see EMR CONTRAST: None FLUOROSCOPIC TIME: None CUMULATIVE AIR KERMA/DOSE:  None STERILE BARRIER TECHNIQUE: Maximal Sterile Barrier Technique Utilized: Cap AND mask AND sterile gown AND sterile gloves AND sterile full body drape AND hand hygiene AND skin preparation 2% chlorhexidine for cutaneous antisepsis (or acceptable alternative  antiseptics).   Sterile Ultrasound Technique Utilized ?Sterile gel AND sterile probe covers. UNIVERSAL PROTOCOL: Standard universal protocol per facility guidelines was followed. See EMR for documentation. TECHNIQUE: Risks, benefits and alternatives were explained to the patient in detail.  All questions were answered.  Written, informed consent was given to proceed with the procedure.   The patient was placed in the supine position on the angiography table. The chest was prepped and draped in the usual sterile fashion. One percent lidocaine was used for local anesthesia. A small skin incision was made over the chest wall port. The port and the catheter were dissected free from the surrounding soft tissues and removed in their entirety. Hemostasis was achieved with manual compression.  The port was then removed from the pocket. The incision was layered absorbable suture and surgical glue. FINDINGS:  The port has been removed in its entirety. . The port pocket is clean and dry.     IMPRESSION: Successful port removal      The longitudinal plan of care for the  diagnosis(es)/condition(s) as documented were addressed during this visit. Due to the added complexity in care, I will continue to support Lizbet in the subsequent management and with ongoing continuity of care.     45 minutes spent by me on the date of the encounter doing chart review, history and exam, documentation and further activities as noted above.     Signed by: Tony Jarvis MD

## 2024-09-05 ENCOUNTER — TELEPHONE (OUTPATIENT)
Dept: ONCOLOGY | Facility: HOSPITAL | Age: 64
End: 2024-09-05
Payer: COMMERCIAL

## 2024-09-05 NOTE — ORAL ONC MGMT
Oral Chemotherapy Monitoring Program   Left Voicemail    Attempted to contact patient today for follow up regarding oral chemotherapy, ribociclib, for new teach education. No answer. Left voicemail for patient to call us back at [phone] when able. No medication name was left.    Raji Rowell, PharmD, [facility]  Clinical Oncology Pharmacist  September 5, 2024

## 2024-09-06 ENCOUNTER — TELEPHONE (OUTPATIENT)
Dept: ONCOLOGY | Facility: HOSPITAL | Age: 64
End: 2024-09-06
Payer: COMMERCIAL

## 2024-09-06 NOTE — ORAL ONC MGMT
Oral Chemotherapy Monitoring Program   Left Voicemail    Attempted to contact patient today for follow up regarding oral chemotherapy, ribociclib, for new start education. No answer. Left voicemail for patient to call us back at [phone] when able. No medication name was left.    Raji Rowell, PharmD, [facility]  Clinical Oncology Pharmacist  September 6, 2024

## 2024-09-09 ENCOUNTER — TELEPHONE (OUTPATIENT)
Dept: ONCOLOGY | Facility: HOSPITAL | Age: 64
End: 2024-09-09
Payer: COMMERCIAL

## 2024-09-09 NOTE — ORAL ONC MGMT
Oral Chemotherapy Monitoring Program   Left Voicemail    Attempted to contact patient today for follow up regarding oral chemotherapy, ribociclib, for new teach. No answer. Left voicemail for patient to call us back at [phone] when able. No medication name was left.    Claudia Leos, PharmD  Oral Chemotherapy Monitoring Program

## 2024-09-10 ENCOUNTER — TELEPHONE (OUTPATIENT)
Dept: ONCOLOGY | Facility: HOSPITAL | Age: 64
End: 2024-09-10
Payer: COMMERCIAL

## 2024-09-10 DIAGNOSIS — C50.411 MALIGNANT NEOPLASM OF UPPER-OUTER QUADRANT OF RIGHT BREAST IN FEMALE, ESTROGEN RECEPTOR POSITIVE (H): Primary | ICD-10-CM

## 2024-09-10 DIAGNOSIS — Z17.0 MALIGNANT NEOPLASM OF UPPER-OUTER QUADRANT OF RIGHT BREAST IN FEMALE, ESTROGEN RECEPTOR POSITIVE (H): Primary | ICD-10-CM

## 2024-09-10 DIAGNOSIS — Z79.811 ENCOUNTER FOR MONITORING AROMATASE INHIBITOR THERAPY: ICD-10-CM

## 2024-09-10 DIAGNOSIS — Z51.81 ENCOUNTER FOR MONITORING AROMATASE INHIBITOR THERAPY: ICD-10-CM

## 2024-09-10 NOTE — ORAL ONC MGMT
"  Oral Chemotherapy Monitoring Program    Lab Monitoring Plan    Subjective/Objective:  Lizbet Nunez is a 63 year old female contacted by phone for an initial visit for oral chemotherapy education.          7/18/2024     9:00 AM 7/18/2024     3:00 PM 7/29/2024     2:00 PM 9/5/2024     1:00 PM 9/6/2024    10:00 AM 9/9/2024     2:00 PM 9/10/2024    11:00 AM   ORAL CHEMOTHERAPY   Assessment Type Left Voicemail New Teach Initial Follow up Left Voicemail Left Voicemail Left Voicemail New Teach   Diagnosis Code Breast Cancer Breast Cancer Breast Cancer Breast Cancer Breast Cancer Breast Cancer Breast Cancer   Providers Dr. Matheus Jarvis   Clinic Name/Location [facility]   Drug Name Verzenio (abemaciclib) Verzenio (abemaciclib) Verzenio (abemaciclib) Kisqali (ribociclib) Kisqali (ribociclib) Kisqali (ribociclib) Kisqali (ribociclib)   Dose 150 mg 150 mg 150 mg 400 mg 400 mg 400 mg 400 mg   Current Schedule BID BID BID Daily Daily Daily Daily   Cycle Details Continuous Continuous Continuous 3 weeks on, 1 week off 3 weeks on, 1 week off 3 weeks on, 1 week off 3 weeks on, 1 week off   Start Date of Last Cycle   7/22/2024       Doses missed in last 2 weeks   0       Adherence Assessment   Adherent       Adverse Effects   Constipation       Any new drug interactions?  Yes No    Yes   Pharmacist Intervention?  Yes     Yes   Intervention(s)  Patient Education     Patient Education   Is the dose as ordered appropriate for the patient?  Yes Yes    Yes       Last PHQ-2 Score on record:        No data to display                Vitals:  BP:   BP Readings from Last 1 Encounters:   08/15/24 109/69     Wt Readings from Last 1 Encounters:   08/15/24 65.7 kg (144 lb 12.8 oz)     Estimated body surface area is 1.71 meters squared as calculated from the following:    Height as of 8/15/24: 1.6 m (5' 3\").    Weight as of " 8/15/24: 65.7 kg (144 lb 12.8 oz).    Labs:  _  Result Component Current Result Ref Range   Sodium 142 (8/15/2024) 135 - 145 mmol/L     _  Result Component Current Result Ref Range   Potassium 3.6 (8/15/2024) 3.4 - 5.3 mmol/L     _  Result Component Current Result Ref Range   Calcium 10.0 (8/15/2024) 8.8 - 10.4 mg/dL     No results found for Mag within last 30 days.     No results found for Phos within last 30 days.     _  Result Component Current Result Ref Range   Albumin 3.9 (8/15/2024) 3.5 - 5.2 g/dL     _  Result Component Current Result Ref Range   Urea Nitrogen 6.9 (L) (8/15/2024) 8.0 - 23.0 mg/dL     _  Result Component Current Result Ref Range   Creatinine 1.11 (H) (8/15/2024) 0.51 - 0.95 mg/dL     _  Result Component Current Result Ref Range   AST 21 (8/15/2024) 0 - 45 U/L     _  Result Component Current Result Ref Range   ALT 11 (8/15/2024) 0 - 50 U/L     _  Result Component Current Result Ref Range   Bilirubin Total 0.5 (8/15/2024) <=1.2 mg/dL     _  Result Component Current Result Ref Range   WBC Count 3.5 (L) (8/15/2024) 4.0 - 11.0 10e3/uL     _  Result Component Current Result Ref Range   Hemoglobin 12.6 (8/15/2024) 11.7 - 15.7 g/dL     _  Result Component Current Result Ref Range   Platelet Count 109 (L) (8/15/2024) 150 - 450 10e3/uL     No results found for ANC within last 30 days.     _  Result Component Current Result Ref Range   Absolute Neutrophils 1.4 (L) (8/15/2024) 1.6 - 8.3 10e3/uL        Assessment:  Patient is appropriate to start therapy.    Plan:  Basic chemotherapy teaching was reviewed with the patient including indication, start date of therapy, dose, administration, adverse effects, missed doses, food and drug interactions, monitoring, side effect management, office contact information, and safe handling. Written materials were provided and all questions answered.    Follow-Up:  Lizbet returns to see Dr. Jarvis on Thursday and will get baseline labs and EKG and then can start the  ribociclib once given the ok by Dr Jarvis. We will call Lizbet 7-10 days after she starts.     Tati Mcmanus, PharmD  Oral Chemotherapy Pharmacist  [phone]

## 2024-09-11 ENCOUNTER — THERAPY VISIT (OUTPATIENT)
Dept: PHYSICAL THERAPY | Facility: REHABILITATION | Age: 64
End: 2024-09-11
Payer: COMMERCIAL

## 2024-09-11 DIAGNOSIS — I89.0 LYMPHEDEMA: ICD-10-CM

## 2024-09-11 DIAGNOSIS — M25.611 DECREASED RANGE OF MOTION OF RIGHT SHOULDER: ICD-10-CM

## 2024-09-11 DIAGNOSIS — C50.411 MALIGNANT NEOPLASM OF UPPER-OUTER QUADRANT OF RIGHT BREAST IN FEMALE, ESTROGEN RECEPTOR POSITIVE (H): Primary | ICD-10-CM

## 2024-09-11 DIAGNOSIS — Z17.0 MALIGNANT NEOPLASM OF UPPER-OUTER QUADRANT OF RIGHT BREAST IN FEMALE, ESTROGEN RECEPTOR POSITIVE (H): Primary | ICD-10-CM

## 2024-09-11 PROCEDURE — 97140 MANUAL THERAPY 1/> REGIONS: CPT | Mod: GP | Performed by: PHYSICAL THERAPIST

## 2024-09-12 ENCOUNTER — ONCOLOGY VISIT (OUTPATIENT)
Dept: ONCOLOGY | Facility: HOSPITAL | Age: 64
End: 2024-09-12
Attending: INTERNAL MEDICINE
Payer: COMMERCIAL

## 2024-09-12 ENCOUNTER — HOSPITAL ENCOUNTER (OUTPATIENT)
Dept: CARDIOLOGY | Facility: CLINIC | Age: 64
Discharge: HOME | End: 2024-09-12
Attending: INTERNAL MEDICINE | Admitting: INTERNAL MEDICINE
Payer: COMMERCIAL

## 2024-09-12 ENCOUNTER — LAB (OUTPATIENT)
Dept: INFUSION THERAPY | Facility: HOSPITAL | Age: 64
End: 2024-09-12
Attending: INTERNAL MEDICINE
Payer: COMMERCIAL

## 2024-09-12 VITALS
TEMPERATURE: 97.6 F | HEART RATE: 82 BPM | BODY MASS INDEX: 26.84 KG/M2 | WEIGHT: 151.5 LBS | DIASTOLIC BLOOD PRESSURE: 60 MMHG | RESPIRATION RATE: 16 BRPM | SYSTOLIC BLOOD PRESSURE: 109 MMHG | HEIGHT: 63 IN | OXYGEN SATURATION: 99 %

## 2024-09-12 DIAGNOSIS — C50.411 MALIGNANT NEOPLASM OF UPPER-OUTER QUADRANT OF RIGHT BREAST IN FEMALE, ESTROGEN RECEPTOR POSITIVE (H): ICD-10-CM

## 2024-09-12 DIAGNOSIS — Z51.81 ENCOUNTER FOR MONITORING AROMATASE INHIBITOR THERAPY: ICD-10-CM

## 2024-09-12 DIAGNOSIS — C50.411 MALIGNANT NEOPLASM OF UPPER-OUTER QUADRANT OF RIGHT BREAST IN FEMALE, ESTROGEN RECEPTOR POSITIVE (H): Primary | ICD-10-CM

## 2024-09-12 DIAGNOSIS — Z79.811 ENCOUNTER FOR MONITORING AROMATASE INHIBITOR THERAPY: ICD-10-CM

## 2024-09-12 DIAGNOSIS — Z17.0 MALIGNANT NEOPLASM OF UPPER-OUTER QUADRANT OF RIGHT BREAST IN FEMALE, ESTROGEN RECEPTOR POSITIVE (H): ICD-10-CM

## 2024-09-12 DIAGNOSIS — Z17.0 MALIGNANT NEOPLASM OF UPPER-OUTER QUADRANT OF RIGHT BREAST IN FEMALE, ESTROGEN RECEPTOR POSITIVE (H): Primary | ICD-10-CM

## 2024-09-12 LAB
ALBUMIN SERPL BCG-MCNC: 3.8 G/DL (ref 3.5–5.2)
ALP SERPL-CCNC: 76 U/L (ref 40–150)
ALT SERPL W P-5'-P-CCNC: 34 U/L (ref 0–50)
ANION GAP SERPL CALCULATED.3IONS-SCNC: 11 MMOL/L (ref 7–15)
AST SERPL W P-5'-P-CCNC: 30 U/L (ref 0–45)
ATRIAL RATE - MUSE: 72 BPM
BASOPHILS # BLD AUTO: 0 10E3/UL (ref 0–0.2)
BASOPHILS NFR BLD AUTO: 0 %
BILIRUB SERPL-MCNC: 0.3 MG/DL
BUN SERPL-MCNC: 9.6 MG/DL (ref 8–23)
CALCIUM SERPL-MCNC: 9.8 MG/DL (ref 8.8–10.4)
CHLORIDE SERPL-SCNC: 105 MMOL/L (ref 98–107)
CREAT SERPL-MCNC: 0.79 MG/DL (ref 0.51–0.95)
DIASTOLIC BLOOD PRESSURE - MUSE: NORMAL MMHG
EGFRCR SERPLBLD CKD-EPI 2021: 84 ML/MIN/1.73M2
EOSINOPHIL # BLD AUTO: 0.1 10E3/UL (ref 0–0.7)
EOSINOPHIL NFR BLD AUTO: 2 %
ERYTHROCYTE [DISTWIDTH] IN BLOOD BY AUTOMATED COUNT: 15.8 % (ref 10–15)
GLUCOSE SERPL-MCNC: 105 MG/DL (ref 70–99)
HCO3 SERPL-SCNC: 24 MMOL/L (ref 22–29)
HCT VFR BLD AUTO: 37.4 % (ref 35–47)
HGB BLD-MCNC: 12.5 G/DL (ref 11.7–15.7)
IMM GRANULOCYTES # BLD: 0 10E3/UL
IMM GRANULOCYTES NFR BLD: 0 %
INTERPRETATION ECG - MUSE: NORMAL
LYMPHOCYTES # BLD AUTO: 1.2 10E3/UL (ref 0.8–5.3)
LYMPHOCYTES NFR BLD AUTO: 25 %
MAGNESIUM SERPL-MCNC: 1.9 MG/DL (ref 1.7–2.3)
MCH RBC QN AUTO: 29.3 PG (ref 26.5–33)
MCHC RBC AUTO-ENTMCNC: 33.4 G/DL (ref 31.5–36.5)
MCV RBC AUTO: 88 FL (ref 78–100)
MONOCYTES # BLD AUTO: 0.5 10E3/UL (ref 0–1.3)
MONOCYTES NFR BLD AUTO: 10 %
NEUTROPHILS # BLD AUTO: 3 10E3/UL (ref 1.6–8.3)
NEUTROPHILS NFR BLD AUTO: 62 %
NRBC # BLD AUTO: 0 10E3/UL
NRBC BLD AUTO-RTO: 0 /100
P AXIS - MUSE: 54 DEGREES
PHOSPHATE SERPL-MCNC: 3.8 MG/DL (ref 2.5–4.5)
PLATELET # BLD AUTO: 204 10E3/UL (ref 150–450)
POTASSIUM SERPL-SCNC: 4.3 MMOL/L (ref 3.4–5.3)
PR INTERVAL - MUSE: 138 MS
PROT SERPL-MCNC: 6.1 G/DL (ref 6.4–8.3)
QRS DURATION - MUSE: 70 MS
QT - MUSE: 388 MS
QTC - MUSE: 424 MS
R AXIS - MUSE: 27 DEGREES
RBC # BLD AUTO: 4.27 10E6/UL (ref 3.8–5.2)
SODIUM SERPL-SCNC: 140 MMOL/L (ref 135–145)
SYSTOLIC BLOOD PRESSURE - MUSE: NORMAL MMHG
T AXIS - MUSE: 53 DEGREES
VENTRICULAR RATE- MUSE: 72 BPM
WBC # BLD AUTO: 4.7 10E3/UL (ref 4–11)

## 2024-09-12 PROCEDURE — G2211 COMPLEX E/M VISIT ADD ON: HCPCS | Performed by: INTERNAL MEDICINE

## 2024-09-12 PROCEDURE — 99214 OFFICE O/P EST MOD 30 MIN: CPT | Performed by: INTERNAL MEDICINE

## 2024-09-12 PROCEDURE — 80053 COMPREHEN METABOLIC PANEL: CPT

## 2024-09-12 PROCEDURE — 36415 COLL VENOUS BLD VENIPUNCTURE: CPT

## 2024-09-12 PROCEDURE — 83735 ASSAY OF MAGNESIUM: CPT

## 2024-09-12 PROCEDURE — 93005 ELECTROCARDIOGRAM TRACING: CPT

## 2024-09-12 PROCEDURE — 85004 AUTOMATED DIFF WBC COUNT: CPT

## 2024-09-12 PROCEDURE — 84100 ASSAY OF PHOSPHORUS: CPT

## 2024-09-12 ASSESSMENT — PAIN SCALES - GENERAL: PAINLEVEL: NO PAIN (0)

## 2024-09-12 NOTE — Clinical Note
"9/12/2024      Lizbet Nunez  [address] [place] [address]      Dear Colleague,    Thank you for referring your patient, Lizbet Nunez, to the [facility]. Please see a copy of my visit note below.    Oncology Rooming Note    September 12, 2024 12:50 PM   Lizbet Nunez is a 63 year old female who presents for:    Chief Complaint   Patient presents with    Oncology Clinic Visit     Malignant neoplasm of upper-outer quadrant of right breast in female, estrogen receptor positive      Initial Vitals: /60   Pulse 82   Temp 97.6  F (36.4  C)   Resp 16   Ht 1.6 m (5' 3\")   Wt 68.7 kg (151 lb 8 oz)   SpO2 99%   BMI 26.84 kg/m   Estimated body mass index is 26.84 kg/m  as calculated from the following:    Height as of this encounter: 1.6 m (5' 3\").    Weight as of this encounter: 68.7 kg (151 lb 8 oz). Body surface area is 1.75 meters squared.  No Pain (0) Comment: Data Unavailable   No LMP recorded.  Allergies reviewed: Yes  Medications reviewed: Yes    Medications: Medication refills not needed today.  Pharmacy name entered into [employer]:    [id number] - [place], [place] - [address] AT [facility] - [place] [place] [facility] [address] [facility]    Frailty Screening:   Is the patient here for a new oncology consult visit in cancer care? 2. No      Clinical concerns:  1 month follow up      Tila Quinonez                Again, thank you for allowing me to participate in the care of your patient.        Sincerely,        Tony Jarvis MD  "

## 2024-09-12 NOTE — PROGRESS NOTES
"Oncology Rooming Note    September 12, 2024 12:50 PM   Lizbet Nunez is a 63 year old female who presents for:    Chief Complaint   Patient presents with    Oncology Clinic Visit     Malignant neoplasm of upper-outer quadrant of right breast in female, estrogen receptor positive      Initial Vitals: /60   Pulse 82   Temp 97.6  F (36.4  C)   Resp 16   Ht 1.6 m (5' 3\")   Wt 68.7 kg (151 lb 8 oz)   SpO2 99%   BMI 26.84 kg/m   Estimated body mass index is 26.84 kg/m  as calculated from the following:    Height as of this encounter: 1.6 m (5' 3\").    Weight as of this encounter: 68.7 kg (151 lb 8 oz). Body surface area is 1.75 meters squared.  No Pain (0) Comment: Data Unavailable   No LMP recorded.  Allergies reviewed: Yes  Medications reviewed: Yes    Medications: Medication refills not needed today.  Pharmacy name entered into [employer]:    [id number] [place], [place] - [address] AT [facility] - [place], [place] - [address]    Frailty Screening:   Is the patient here for a new oncology consult visit in cancer care? 2. No      Clinical concerns:  1 month follow up      Tila Quinonez            "

## 2024-09-18 DIAGNOSIS — Z17.0 MALIGNANT NEOPLASM OF UPPER-OUTER QUADRANT OF RIGHT BREAST IN FEMALE, ESTROGEN RECEPTOR POSITIVE (H): ICD-10-CM

## 2024-09-18 DIAGNOSIS — Z51.81 ENCOUNTER FOR MONITORING AROMATASE INHIBITOR THERAPY: ICD-10-CM

## 2024-09-18 DIAGNOSIS — C50.411 MALIGNANT NEOPLASM OF UPPER-OUTER QUADRANT OF RIGHT BREAST IN FEMALE, ESTROGEN RECEPTOR POSITIVE (H): ICD-10-CM

## 2024-09-18 DIAGNOSIS — Z79.811 ENCOUNTER FOR MONITORING AROMATASE INHIBITOR THERAPY: ICD-10-CM

## 2024-09-18 RX ORDER — RIBOCICLIB 200 MG/1
TABLET, FILM COATED ORAL
Qty: 42 TABLET | Refills: 0 | OUTPATIENT
Start: 2024-09-18

## 2024-09-18 NOTE — TELEPHONE ENCOUNTER
Refusing RX refill request. Medication sent on 9/10, will sent refill when clinically appropriate. Oral chemo pharmacy team manages refills.    Aurora De La Torre RN

## 2024-09-20 ENCOUNTER — TELEPHONE (OUTPATIENT)
Dept: ONCOLOGY | Facility: HOSPITAL | Age: 64
End: 2024-09-20
Payer: COMMERCIAL

## 2024-09-20 NOTE — ORAL ONC MGMT
Oral Chemotherapy Monitoring Program   Left Voicemail    Attempted to contact patient today for follow up regarding oral chemotherapy, ribociclib, for initial assessment. No answer. Left voicemail for patient to call us back at [phone] when able. No medication name was left.    Brenda Cordova, PharmD, [facility]  Oral Chemotherapy Pharmacist  [phone]

## 2024-09-23 ENCOUNTER — TELEPHONE (OUTPATIENT)
Dept: ONCOLOGY | Facility: HOSPITAL | Age: 64
End: 2024-09-23
Payer: COMMERCIAL

## 2024-09-23 NOTE — ORAL ONC MGMT
Oral Chemotherapy Monitoring Program   Left Voicemail    Attempted to contact patient today for follow up regarding oral chemotherapy, ribociclib, for breast cancer. No answer. Left voicemail for patient to call us back at [phone] when able. No medication name was left.    Will send Register My InfoÂ®hart message also.    Tati Mcmanus, PharmD  Oral Chemotherapy Pharmacist  [phone]

## 2024-09-26 ENCOUNTER — PATIENT OUTREACH (OUTPATIENT)
Dept: ONCOLOGY | Facility: HOSPITAL | Age: 64
End: 2024-09-26

## 2024-09-26 ENCOUNTER — LAB (OUTPATIENT)
Dept: INFUSION THERAPY | Facility: HOSPITAL | Age: 64
End: 2024-09-26
Attending: INTERNAL MEDICINE
Payer: COMMERCIAL

## 2024-09-26 ENCOUNTER — HOSPITAL ENCOUNTER (OUTPATIENT)
Dept: CARDIOLOGY | Facility: CLINIC | Age: 64
Discharge: HOME | End: 2024-09-26
Attending: INTERNAL MEDICINE | Admitting: INTERNAL MEDICINE
Payer: COMMERCIAL

## 2024-09-26 DIAGNOSIS — Z51.81 ENCOUNTER FOR MONITORING AROMATASE INHIBITOR THERAPY: ICD-10-CM

## 2024-09-26 DIAGNOSIS — Z79.811 ENCOUNTER FOR MONITORING AROMATASE INHIBITOR THERAPY: ICD-10-CM

## 2024-09-26 DIAGNOSIS — C50.411 MALIGNANT NEOPLASM OF UPPER-OUTER QUADRANT OF RIGHT BREAST IN FEMALE, ESTROGEN RECEPTOR POSITIVE (H): ICD-10-CM

## 2024-09-26 DIAGNOSIS — Z17.0 MALIGNANT NEOPLASM OF UPPER-OUTER QUADRANT OF RIGHT BREAST IN FEMALE, ESTROGEN RECEPTOR POSITIVE (H): ICD-10-CM

## 2024-09-26 LAB
ALBUMIN SERPL BCG-MCNC: 3.9 G/DL (ref 3.5–5.2)
ALP SERPL-CCNC: 68 U/L (ref 40–150)
ALT SERPL W P-5'-P-CCNC: 23 U/L (ref 0–50)
ANION GAP SERPL CALCULATED.3IONS-SCNC: 13 MMOL/L (ref 7–15)
AST SERPL W P-5'-P-CCNC: 25 U/L (ref 0–45)
ATRIAL RATE - MUSE: 71 BPM
BASOPHILS # BLD AUTO: 0 10E3/UL (ref 0–0.2)
BASOPHILS NFR BLD AUTO: 1 %
BILIRUB SERPL-MCNC: 0.4 MG/DL
BUN SERPL-MCNC: 14.8 MG/DL (ref 8–23)
CALCIUM SERPL-MCNC: 9.7 MG/DL (ref 8.8–10.4)
CHLORIDE SERPL-SCNC: 108 MMOL/L (ref 98–107)
CREAT SERPL-MCNC: 0.94 MG/DL (ref 0.51–0.95)
DIASTOLIC BLOOD PRESSURE - MUSE: NORMAL MMHG
EGFRCR SERPLBLD CKD-EPI 2021: 68 ML/MIN/1.73M2
EOSINOPHIL # BLD AUTO: 0.1 10E3/UL (ref 0–0.7)
EOSINOPHIL NFR BLD AUTO: 4 %
ERYTHROCYTE [DISTWIDTH] IN BLOOD BY AUTOMATED COUNT: 15.5 % (ref 10–15)
GLUCOSE SERPL-MCNC: 109 MG/DL (ref 70–99)
HCO3 SERPL-SCNC: 20 MMOL/L (ref 22–29)
HCT VFR BLD AUTO: 34.9 % (ref 35–47)
HGB BLD-MCNC: 12 G/DL (ref 11.7–15.7)
IMM GRANULOCYTES # BLD: 0 10E3/UL
IMM GRANULOCYTES NFR BLD: 0 %
INTERPRETATION ECG - MUSE: NORMAL
LYMPHOCYTES # BLD AUTO: 0.8 10E3/UL (ref 0.8–5.3)
LYMPHOCYTES NFR BLD AUTO: 28 %
MAGNESIUM SERPL-MCNC: 2 MG/DL (ref 1.7–2.3)
MCH RBC QN AUTO: 30.8 PG (ref 26.5–33)
MCHC RBC AUTO-ENTMCNC: 34.4 G/DL (ref 31.5–36.5)
MCV RBC AUTO: 90 FL (ref 78–100)
MONOCYTES # BLD AUTO: 0.2 10E3/UL (ref 0–1.3)
MONOCYTES NFR BLD AUTO: 6 %
NEUTROPHILS # BLD AUTO: 1.7 10E3/UL (ref 1.6–8.3)
NEUTROPHILS NFR BLD AUTO: 62 %
NRBC # BLD AUTO: 0 10E3/UL
NRBC BLD AUTO-RTO: 0 /100
P AXIS - MUSE: 66 DEGREES
PHOSPHATE SERPL-MCNC: 3.5 MG/DL (ref 2.5–4.5)
PLATELET # BLD AUTO: 193 10E3/UL (ref 150–450)
POTASSIUM SERPL-SCNC: 4.1 MMOL/L (ref 3.4–5.3)
PR INTERVAL - MUSE: 140 MS
PROT SERPL-MCNC: 6 G/DL (ref 6.4–8.3)
QRS DURATION - MUSE: 70 MS
QT - MUSE: 390 MS
QTC - MUSE: 423 MS
R AXIS - MUSE: 27 DEGREES
RBC # BLD AUTO: 3.9 10E6/UL (ref 3.8–5.2)
SODIUM SERPL-SCNC: 141 MMOL/L (ref 135–145)
SYSTOLIC BLOOD PRESSURE - MUSE: NORMAL MMHG
T AXIS - MUSE: 65 DEGREES
VENTRICULAR RATE- MUSE: 71 BPM
WBC # BLD AUTO: 2.8 10E3/UL (ref 4–11)

## 2024-09-26 PROCEDURE — 80053 COMPREHEN METABOLIC PANEL: CPT

## 2024-09-26 PROCEDURE — 84100 ASSAY OF PHOSPHORUS: CPT

## 2024-09-26 PROCEDURE — 83735 ASSAY OF MAGNESIUM: CPT

## 2024-09-26 PROCEDURE — 93005 ELECTROCARDIOGRAM TRACING: CPT

## 2024-09-26 PROCEDURE — 93010 ELECTROCARDIOGRAM REPORT: CPT | Performed by: INTERNAL MEDICINE

## 2024-09-26 PROCEDURE — 36415 COLL VENOUS BLD VENIPUNCTURE: CPT

## 2024-09-26 PROCEDURE — 85025 COMPLETE CBC W/AUTO DIFF WBC: CPT

## 2024-09-26 NOTE — PROGRESS NOTES
"[facility]: Cancer Care Follow-Up Note                                    Discussion with Patient:                                                      Dr. Jarvis reviewed EKG and lab results (CBC, CMP, Mg, P) and said everything looks good.  No changes. Follow-up as scheduled for EKG, labs, Dr. Jarvis on 10/10/24.     Dates of Treatment:                                                      Infusion given in last 28 days       None            Assessment:                                                      Refer to EKG and lab results from 9/26/24.    Intervention/Education provided during outreach:                                                       Called patientX3-\"call couldn't be completed. Try your call again later\".    [employer] message sent to patient.    Patient to follow up as scheduled at next appt    Signature:  Laverne Green RN      "

## 2024-09-27 ENCOUNTER — TELEPHONE (OUTPATIENT)
Dept: PHARMACY | Facility: CLINIC | Age: 64
End: 2024-09-27
Payer: COMMERCIAL

## 2024-09-27 ENCOUNTER — TELEPHONE (OUTPATIENT)
Dept: ONCOLOGY | Facility: HOSPITAL | Age: 64
End: 2024-09-27
Payer: COMMERCIAL

## 2024-09-27 NOTE — ORAL ONC MGMT
Oral Chemotherapy Monitoring Program  Lab Follow Up    Patient currently on ribociclib therapy for breast cancer.    Reviewed lab results from 9/26/24.    No concerning abnormalities.  EKG, Qtc 426 good    Assessment & Plan:  Continue ribociclib 400 mg 3 weeks on, 1 week off    Attempted to contact patient but no answer. Left voicemail for patient to call us back at [phone] when able. No medication name was left but did let her know her labs and EKG looked good so to continue her medication prescribed by Dr. Jarvis.     I also asked her to let us know when she started her medication either via mychart or phone so we can make sure we get her a refill on time.    Follow-Up:  We will try again to contact Lizbet to make sure she is doing okay.     Tati Mcmanus, PharmD  Oral Chemotherapy Pharmacist  [phone]

## 2024-09-29 RX ORDER — ANASTROZOLE 1 MG/1
1 TABLET ORAL DAILY
Qty: 90 TABLET | Refills: 3 | Status: SHIPPED | OUTPATIENT
Start: 2024-09-29

## 2024-09-30 NOTE — PROGRESS NOTES
[facility] Hematology and Oncology Progress Note    Patient: Lizbet Nunez  MRN: [id number]  Date of Service: Sep 12, 2024         Reason for Visit    Chief Complaint   Patient presents with    Oncology Clinic Visit     Malignant neoplasm of upper-outer quadrant of right breast in female, estrogen receptor positive        Assessment and Plan     Cancer Staging   Malignant neoplasm of upper-outer quadrant of right breast in female, estrogen receptor positive (H)  Staging form: Breast, AJCC 8th Edition  - Pathologic stage from 8/31/2023: Stage IB (pT1c, pN2a, cM0, G2, ER+, AZ+, HER2-) - Signed by Tony Jarvis MD on 5/9/2024      ECOG Performance    0 - Independent     Pain  Pain Score: No Pain (0)    #.  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  ER positive, AZ positive, HER2 negative (0 by IHC)     She looks clinically well.  I reviewed her labs and EKG.  They are all unremarkable and normal.  QTc was 424 ms. I recommended her to start ribociclib 400 mg daily for 21 days on and 7 days off.   She will return to clinic in 2 weeks for recheck EKG and labs.  Follow-up labs and provider visit and EKG in 4 weeks.    She is advised to continue anastrozole.  She is advised to call us sooner with any concerns.     #.  Bone health   She takes vitamin D and calcium.  I advised her to focus on weightbearing exercises.  I reviewed the DEXA scan from 5/2024.  It showed low bone density.  Once she is stable on adjuvant endocrine therapy, I will plan to start bone strengthener agent such as Reclast.       Encounter Diagnoses:    Problem List Items Addressed This Visit          Oncology Diagnoses    Malignant neoplasm of upper-outer quadrant of right breast in female, estrogen receptor positive (H) - Primary    Relevant Medications    anastrozole (ARIMIDEX) 1 MG tablet    Other Relevant Orders    CBC with platelets differential  (Completed)    Comprehensive metabolic panel (Completed)    Magnesium (Completed)    Phosphorus (Completed)    CBC with platelets differential    Comprehensive metabolic panel    Magnesium    Phosphorus    EKG 12-lead, complete (Completed)    EKG 12-lead complete w/read - Clinics       Other    Encounter for monitoring aromatase inhibitor therapy    Relevant Orders    CBC with platelets differential (Completed)    Comprehensive metabolic panel (Completed)    Magnesium (Completed)    Phosphorus (Completed)    CBC with platelets differential    Comprehensive metabolic panel    Magnesium    Phosphorus    EKG 12-lead, complete (Completed)    EKG 12-lead complete w/read - Clinics        CC: Leonie Manning MD   ______________________________________________________________________________  Oncologic history  7/2023-screening mammogram detected right breast cancer.  Sequent diagnostic mammogram and ultrasound demonstrated 0.8 x 0.6 x 0.6 cm hypoechoic mass 10 o'clock position in the right breast.  No suspicious lymph nodes in the right axilla.   -Core needle biopsy showed invasive lobular carcinoma with pleomorphic features, grade 2.  ER 90%, %, HER2 0 by IHC.    7/27/2023-bilateral breast MRI showed 2 x 1.2 x 1.1 cm right breast cancer without evidence of lymphadenopathy.    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    8/31/2023-right breast lumpectomy and right axillary sentinel lymph node biopsy.   Invasive carcinoma with mixed ductal and pleomorphic lobular features, grade 2.  2 cm.  Margins were negative.   DCIS present with closest skin margin to 1 mm.   4/4 sentinel lymph nodes were positive (2 macro mets and 2 micro mets) for metastatic carcinoma with largest metastatic deposit of 5 mm.  Negative for extranodal extension.   DCIS and LCIS present.  ER 90%, PA 80%, HER2 1+ by IHC.   pT1c N2a M0    9/26/2023-PET scan showed no evidence of distant metastasis    10/12/2023-right axillary lymph node  "dissection   1 of 20 lymph node showed metastatic adenocarcinoma, 4 mm in size.  No extranodal extension.    11/21/2023-4/23/2024-completed adjuvant chemotherapy with dose dense AC followed by weekly paclitaxel in [place].    4/24/2024-CT scan showed interval development of small pericardial effusion.  No pleural effusion.  She has seroma in the right axilla.  No next of malignancy.    7/11/2024- started anastrozole    7/22/204- started abemaciclib.  Stopped on 8/15/2024 due to intolerable and undesirable side effects of diarrhea.    9/12/2024-started ribociclib.      History of Present Illness    Ms. Lizbet Nunez presented today for follow up.     She is feeling well.  She does not have any side effects from anastrozole.  She is here to start ribociclib.      Review of systems  Apart from describing in HPI, the remainder of comprehensive ROS was negative.    Past History    No past medical history on file.    Past Surgical History:   Procedure Laterality Date    IR CHEST PORT PLACEMENT > 5 YRS OF AGE  11/7/2023    IR PORT REMOVAL LEFT  8/5/2024       Physical Exam    /60   Pulse 82   Temp 97.6  F (36.4  C)   Resp 16   Ht 1.6 m (5' 3\")   Wt 68.7 kg (151 lb 8 oz)   SpO2 99%   BMI 26.84 kg/m      General: alert, awake, not in acute distress  HEENT: Head: Normal, normocephalic, atraumatic.  Eye: Normal external eye, conjunctiva, lids cornea, YUMIKO.  Nose: Normal external nose, mucus membranes and septum.  Pharynx: Normal buccal mucosa. Normal pharynx.  Neck / Thyroid: Supple, no masses, nodes, nodules or enlargement.  Lymphatics: No abnormally enlarged lymph nodes.  Chest: Normal chest wall and respirations. Clear to auscultation.  Heart: S1 S2 RRR, no murmur.   Abdomen: abdomen is soft without significant tenderness, masses, organomegaly or guarding  Extremities: normal strength, tone, and muscle mass  Skin: normal. no rash or abnormalities  CNS: non focal.    Lab Results    Recent Results (from the past " 240 hour(s))   EKG 12-lead, complete    Collection Time: 09/26/24 11:29 AM   Result Value Ref Range    Systolic Blood Pressure  mmHg    Diastolic Blood Pressure  mmHg    Ventricular Rate 71 BPM    Atrial Rate 71 BPM    IA Interval 140 ms    QRS Duration 70 ms     ms    QTc 423 ms    P Axis 66 degrees    R AXIS 27 degrees    T Axis 65 degrees    Interpretation ECG       Sinus rhythm    Normal ECG  When compared with ECG of 12-Sep-2024 11:33,  No significant change was found  Confirmed by KARLA RICE MD LOC: ([id number]) on 9/26/2024 1:11:07 PM     Comprehensive metabolic panel    Collection Time: 09/26/24 12:42 PM   Result Value Ref Range    Sodium 141 135 - 145 mmol/L    Potassium 4.1 3.4 - 5.3 mmol/L    Carbon Dioxide (CO2) 20 (L) 22 - 29 mmol/L    Anion Gap 13 7 - 15 mmol/L    Urea Nitrogen 14.8 8.0 - 23.0 mg/dL    Creatinine 0.94 0.51 - 0.95 mg/dL    GFR Estimate 68 >60 mL/min/1.73m2    Calcium 9.7 8.8 - 10.4 mg/dL    Chloride 108 (H) 98 - 107 mmol/L    Glucose 109 (H) 70 - 99 mg/dL    Alkaline Phosphatase 68 40 - 150 U/L    AST 25 0 - 45 U/L    ALT 23 0 - 50 U/L    Protein Total 6.0 (L) 6.4 - 8.3 g/dL    Albumin 3.9 3.5 - 5.2 g/dL    Bilirubin Total 0.4 <=1.2 mg/dL   Magnesium    Collection Time: 09/26/24 12:42 PM   Result Value Ref Range    Magnesium 2.0 1.7 - 2.3 mg/dL   Phosphorus    Collection Time: 09/26/24 12:42 PM   Result Value Ref Range    Phosphorus 3.5 2.5 - 4.5 mg/dL   CBC with platelets and differential    Collection Time: 09/26/24 12:42 PM   Result Value Ref Range    WBC Count 2.8 (L) 4.0 - 11.0 10e3/uL    RBC Count 3.90 3.80 - 5.20 10e6/uL    Hemoglobin 12.0 11.7 - 15.7 g/dL    Hematocrit 34.9 (L) 35.0 - 47.0 %    MCV 90 78 - 100 fL    MCH 30.8 26.5 - 33.0 pg    MCHC 34.4 31.5 - 36.5 g/dL    RDW 15.5 (H) 10.0 - 15.0 %    Platelet Count 193 150 - 450 10e3/uL    % Neutrophils 62 %    % Lymphocytes 28 %    % Monocytes 6 %    % Eosinophils 4 %    % Basophils 1 %    % Immature Granulocytes 0 %     NRBCs per 100 WBC 0 <1 /100    Absolute Neutrophils 1.7 1.6 - 8.3 10e3/uL    Absolute Lymphocytes 0.8 0.8 - 5.3 10e3/uL    Absolute Monocytes 0.2 0.0 - 1.3 10e3/uL    Absolute Eosinophils 0.1 0.0 - 0.7 10e3/uL    Absolute Basophils 0.0 0.0 - 0.2 10e3/uL    Absolute Immature Granulocytes 0.0 <=0.4 10e3/uL    Absolute NRBCs 0.0 10e3/uL         Imaging    MA Screening Bilateral w/ Manuela    Result Date: 9/16/2024  MM MAMMOGRAM SCREENING BILAT W 3D MANUELA W CAD performed on 9/13/24 FDA Accredited Facility: [place] [address] [phone] Compared to: 07/05/2023 MM Mammogram Screening Bilat W 3D Manuela W CAD, 03/07/2018 MM Mammogram Screening Bilat W Manuela W CAD, and 04/29/2015 MAMMOGRAM SCREENING BILATERAL   FINDINGS: Bilateral screening mammogram was performed with the assistance of Computer-Aided Detection and breast tomosynthesis. There are scattered areas of fibroglandular density. There are breast conservation changes on the right. There is no radiographic evidence of malignancy.      : ACR BI-RADS Category 2: Benign RECOMMENDATION: Follow Up Imaging in 12 months - Bilateral The results and recommendations of this examination will be communicated to the patient.     The longitudinal plan of care for the diagnosis(es)/condition(s) as documented were addressed during this visit. Due to the added complexity in care, I will continue to support Lizbet in the subsequent management and with ongoing continuity of care.     30 minutes spent by me on the date of the encounter doing chart review, history and exam, documentation and further activities as noted above.    Signed by: Tony Jarvis MD

## 2024-10-01 ENCOUNTER — TELEPHONE (OUTPATIENT)
Dept: ONCOLOGY | Facility: HOSPITAL | Age: 64
End: 2024-10-01
Payer: COMMERCIAL

## 2024-10-01 NOTE — ORAL ONC MGMT
"Oral Chemotherapy Monitoring Program   Attempted to Leave Voicemail    Attempted to contact patient today for follow up regarding oral chemotherapy, ribociclib, for routine assessment. No answer. Could not leave Voicemail since the call could not go through to her listed phone number.     Raji Rowell, PharmD, BCOP  Oral Chemotherapy Pharmacist  [facility] Phone: [phone]  In Basket Pools: \"[facility] ORAL CHEMOTHERAPY PHARMACIST\" or \"[facility] ORAL CHEMOTHERAPY PHARMACIST\"  October 1, 2024      "

## 2024-10-04 ENCOUNTER — THERAPY VISIT (OUTPATIENT)
Dept: PHYSICAL THERAPY | Facility: REHABILITATION | Age: 64
End: 2024-10-04
Payer: COMMERCIAL

## 2024-10-04 DIAGNOSIS — C50.411 MALIGNANT NEOPLASM OF UPPER-OUTER QUADRANT OF RIGHT BREAST IN FEMALE, ESTROGEN RECEPTOR POSITIVE (H): Primary | ICD-10-CM

## 2024-10-04 DIAGNOSIS — M25.611 DECREASED RANGE OF MOTION OF RIGHT SHOULDER: ICD-10-CM

## 2024-10-04 DIAGNOSIS — Z17.0 MALIGNANT NEOPLASM OF UPPER-OUTER QUADRANT OF RIGHT BREAST IN FEMALE, ESTROGEN RECEPTOR POSITIVE (H): Primary | ICD-10-CM

## 2024-10-04 DIAGNOSIS — I89.0 LYMPHEDEMA: ICD-10-CM

## 2024-10-04 PROCEDURE — 97140 MANUAL THERAPY 1/> REGIONS: CPT | Mod: GP | Performed by: PHYSICAL THERAPIST

## 2024-10-10 ENCOUNTER — HOSPITAL ENCOUNTER (OUTPATIENT)
Dept: CARDIOLOGY | Facility: CLINIC | Age: 64
Discharge: HOME | End: 2024-10-10
Attending: INTERNAL MEDICINE | Admitting: INTERNAL MEDICINE
Payer: COMMERCIAL

## 2024-10-10 ENCOUNTER — TRANSCRIBE ORDERS (OUTPATIENT)
Dept: ONCOLOGY | Facility: HOSPITAL | Age: 64
End: 2024-10-10
Payer: COMMERCIAL

## 2024-10-10 ENCOUNTER — LAB (OUTPATIENT)
Dept: INFUSION THERAPY | Facility: HOSPITAL | Age: 64
End: 2024-10-10
Attending: INTERNAL MEDICINE
Payer: COMMERCIAL

## 2024-10-10 ENCOUNTER — ONCOLOGY VISIT (OUTPATIENT)
Dept: ONCOLOGY | Facility: HOSPITAL | Age: 64
End: 2024-10-10
Attending: INTERNAL MEDICINE
Payer: COMMERCIAL

## 2024-10-10 VITALS
DIASTOLIC BLOOD PRESSURE: 69 MMHG | WEIGHT: 158 LBS | BODY MASS INDEX: 28 KG/M2 | HEART RATE: 80 BPM | SYSTOLIC BLOOD PRESSURE: 118 MMHG | OXYGEN SATURATION: 98 % | TEMPERATURE: 97.8 F | RESPIRATION RATE: 16 BRPM | HEIGHT: 63 IN

## 2024-10-10 DIAGNOSIS — Z51.81 ENCOUNTER FOR MONITORING AROMATASE INHIBITOR THERAPY: ICD-10-CM

## 2024-10-10 DIAGNOSIS — C50.411 MALIGNANT NEOPLASM OF UPPER-OUTER QUADRANT OF RIGHT BREAST IN FEMALE, ESTROGEN RECEPTOR POSITIVE (H): ICD-10-CM

## 2024-10-10 DIAGNOSIS — Z17.0 MALIGNANT NEOPLASM OF UPPER-OUTER QUADRANT OF RIGHT BREAST IN FEMALE, ESTROGEN RECEPTOR POSITIVE (H): ICD-10-CM

## 2024-10-10 DIAGNOSIS — Z51.81 ENCOUNTER FOR THERAPEUTIC DRUG MONITORING: ICD-10-CM

## 2024-10-10 DIAGNOSIS — C50.411 MALIGNANT NEOPLASM OF UPPER-OUTER QUADRANT OF RIGHT BREAST IN FEMALE, ESTROGEN RECEPTOR POSITIVE (H): Primary | ICD-10-CM

## 2024-10-10 DIAGNOSIS — Z79.811 ENCOUNTER FOR MONITORING AROMATASE INHIBITOR THERAPY: ICD-10-CM

## 2024-10-10 DIAGNOSIS — C50.919 BREAST CANCER (H): Primary | ICD-10-CM

## 2024-10-10 DIAGNOSIS — C50.919 BREAST CANCER (H): ICD-10-CM

## 2024-10-10 DIAGNOSIS — Z79.811 LONG TERM CURRENT USE OF AROMATASE INHIBITOR: ICD-10-CM

## 2024-10-10 DIAGNOSIS — Z17.0 MALIGNANT NEOPLASM OF UPPER-OUTER QUADRANT OF RIGHT BREAST IN FEMALE, ESTROGEN RECEPTOR POSITIVE (H): Primary | ICD-10-CM

## 2024-10-10 LAB
ALBUMIN SERPL BCG-MCNC: 3.8 G/DL (ref 3.5–5.2)
ALP SERPL-CCNC: 81 U/L (ref 40–150)
ALT SERPL W P-5'-P-CCNC: 46 U/L (ref 0–50)
ANION GAP SERPL CALCULATED.3IONS-SCNC: 9 MMOL/L (ref 7–15)
AST SERPL W P-5'-P-CCNC: 46 U/L (ref 0–45)
ATRIAL RATE - MUSE: 71 BPM
BASOPHILS # BLD AUTO: 0 10E3/UL (ref 0–0.2)
BASOPHILS NFR BLD AUTO: 1 %
BILIRUB SERPL-MCNC: 0.2 MG/DL
BUN SERPL-MCNC: 10.1 MG/DL (ref 8–23)
CALCIUM SERPL-MCNC: 9.9 MG/DL (ref 8.8–10.4)
CHLORIDE SERPL-SCNC: 107 MMOL/L (ref 98–107)
CREAT SERPL-MCNC: 0.78 MG/DL (ref 0.51–0.95)
DIASTOLIC BLOOD PRESSURE - MUSE: NORMAL MMHG
EGFRCR SERPLBLD CKD-EPI 2021: 84 ML/MIN/1.73M2
EOSINOPHIL # BLD AUTO: 0 10E3/UL (ref 0–0.7)
EOSINOPHIL NFR BLD AUTO: 1 %
ERYTHROCYTE [DISTWIDTH] IN BLOOD BY AUTOMATED COUNT: 16.3 % (ref 10–15)
GLUCOSE SERPL-MCNC: 108 MG/DL (ref 70–99)
HCO3 SERPL-SCNC: 25 MMOL/L (ref 22–29)
HCT VFR BLD AUTO: 36.5 % (ref 35–47)
HGB BLD-MCNC: 12.4 G/DL (ref 11.7–15.7)
IMM GRANULOCYTES # BLD: 0 10E3/UL
IMM GRANULOCYTES NFR BLD: 0 %
INTERPRETATION ECG - MUSE: NORMAL
LYMPHOCYTES # BLD AUTO: 0.9 10E3/UL (ref 0.8–5.3)
LYMPHOCYTES NFR BLD AUTO: 31 %
MAGNESIUM SERPL-MCNC: 1.9 MG/DL (ref 1.7–2.3)
MCH RBC QN AUTO: 31.6 PG (ref 26.5–33)
MCHC RBC AUTO-ENTMCNC: 34 G/DL (ref 31.5–36.5)
MCV RBC AUTO: 93 FL (ref 78–100)
MONOCYTES # BLD AUTO: 0.3 10E3/UL (ref 0–1.3)
MONOCYTES NFR BLD AUTO: 11 %
NEUTROPHILS # BLD AUTO: 1.6 10E3/UL (ref 1.6–8.3)
NEUTROPHILS NFR BLD AUTO: 56 %
NRBC # BLD AUTO: 0 10E3/UL
NRBC BLD AUTO-RTO: 0 /100
P AXIS - MUSE: 63 DEGREES
PHOSPHATE SERPL-MCNC: 3.2 MG/DL (ref 2.5–4.5)
PLATELET # BLD AUTO: 168 10E3/UL (ref 150–450)
POTASSIUM SERPL-SCNC: 4.2 MMOL/L (ref 3.4–5.3)
PR INTERVAL - MUSE: 136 MS
PROT SERPL-MCNC: 6 G/DL (ref 6.4–8.3)
QRS DURATION - MUSE: 74 MS
QT - MUSE: 392 MS
QTC - MUSE: 425 MS
R AXIS - MUSE: 39 DEGREES
RBC # BLD AUTO: 3.92 10E6/UL (ref 3.8–5.2)
SODIUM SERPL-SCNC: 141 MMOL/L (ref 135–145)
SYSTOLIC BLOOD PRESSURE - MUSE: NORMAL MMHG
T AXIS - MUSE: 64 DEGREES
VENTRICULAR RATE- MUSE: 71 BPM
WBC # BLD AUTO: 2.9 10E3/UL (ref 4–11)

## 2024-10-10 PROCEDURE — 99214 OFFICE O/P EST MOD 30 MIN: CPT

## 2024-10-10 PROCEDURE — 36415 COLL VENOUS BLD VENIPUNCTURE: CPT

## 2024-10-10 PROCEDURE — 93005 ELECTROCARDIOGRAM TRACING: CPT

## 2024-10-10 PROCEDURE — 85025 COMPLETE CBC W/AUTO DIFF WBC: CPT

## 2024-10-10 PROCEDURE — 80053 COMPREHEN METABOLIC PANEL: CPT

## 2024-10-10 PROCEDURE — 99213 OFFICE O/P EST LOW 20 MIN: CPT

## 2024-10-10 PROCEDURE — 83735 ASSAY OF MAGNESIUM: CPT

## 2024-10-10 PROCEDURE — G2211 COMPLEX E/M VISIT ADD ON: HCPCS

## 2024-10-10 PROCEDURE — 84100 ASSAY OF PHOSPHORUS: CPT

## 2024-10-10 PROCEDURE — 93010 ELECTROCARDIOGRAM REPORT: CPT | Performed by: GENERAL ACUTE CARE HOSPITAL

## 2024-10-10 ASSESSMENT — PAIN SCALES - GENERAL: PAINLEVEL: NO PAIN (0)

## 2024-10-10 NOTE — PROGRESS NOTES
"Oncology Rooming Note    October 10, 2024 12:06 PM   Lizbet Nunez is a 64 year old female who presents for:    Chief Complaint   Patient presents with    Oncology Clinic Visit     Malignant neoplasm of upper-outer quadrant of right breast in female, estrogen receptor positive     Initial Vitals: /69   Pulse 80   Temp 97.8  F (36.6  C)   Resp 16   Ht 1.6 m (5' 3\")   Wt 71.7 kg (158 lb)   SpO2 98%   BMI 27.99 kg/m   Estimated body mass index is 27.99 kg/m  as calculated from the following:    Height as of this encounter: 1.6 m (5' 3\").    Weight as of this encounter: 71.7 kg (158 lb). Body surface area is 1.79 meters squared.  No Pain (0) Comment: Data Unavailable   No LMP recorded.  Allergies reviewed: Yes  Medications reviewed: Yes    Medications: MEDICATION REFILLS NEEDED TODAY. Provider was notified.  Pharmacy name entered into [employer] D[employer]:    [id number] - [place], [place] - [address] [facility] [place], [place] - [address] SPECIAL[facility] [place], [place] [address]    Frailty Screening:   Is the patient here for a new oncology consult visit in cancer care? 2. No      Clinical concerns:  labs and follow up      Tila Quinonez              "

## 2024-10-10 NOTE — LETTER
10/10/2024      Lizbet Nunez  [address] [place] [address]      Dear Colleague,    Thank you for referring your patient, Lizbet Nunez, to the [facility]. Please see a copy of my visit note below.    [facility] Hematology and Oncology Outpatient Progress Note    Patient: Lizbet Nunez  MRN: [id number]  Date of Service: Oct 10, 2024          Reason for Visit    Chief Complaint   Patient presents with     Oncology Clinic Visit     Malignant neoplasm of upper-outer quadrant of right breast in female, estrogen receptor positive        Cancer Staging   Malignant neoplasm of upper-outer quadrant of right breast in female, estrogen receptor positive (H)  Staging form: Breast, AJCC 8th Edition  - Pathologic stage from 8/31/2023: Stage IB (pT1c, pN2a, cM0, G2, ER+, CA+, HER2-) - Signed by Tony Jarvis MD on 5/9/2024      Primary Hematologist/Oncologist: Dr. Tony Jarvis        Assessment/Plan  #.  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  ER positive, CA positive, HER2 negative (0 by IHC)  Patient is clinically stable. She reports good toleration of Kisqali and anastrozole thus far. She has notable side effects of fatigue and hot flashes. We reviewed labs today including CBC, CMP. Labs are all stable/adequate. WBC has decreased to 2.9, with normal ANC. EKG stable, QTc was 425 ms.  With good tolerationof current therapy, I do recommend to continue with plan.   Proceed with next cycle of Kisqali at current dose 400 mg daily for 21 days on and 7 days off.  Continue anastrozole daily.  Labs in 2 weeks per protocol.  Follow-up with labs and provider visit in 4 weeks. Patient states she will be transferring care to her [place] oncologist prior to next visit.  I will ask our nursing staff to help coordinate care.    #.  Bone health  She takes vitamin D and calcium.  I advised her to focus  on weightbearing exercises.  I reviewed the DEXA scan from 5/2024.  It showed low bone density.  Once she is stable on adjuvant endocrine therapy, I will plan to start bone strengthener agent such as Reclast.     ______________________________________________________________________________    History of Present Illness/ Interval History    Ms. Lizbet Nunez  is a 64 year old patient who is seen today after starting Verzenio and anastrozole.  She reports that she is feeling well, though does have increased fatigue.  She has been able to nap which is helpful.  She has some muscle pain.  Normally she is constipated so this medication has made her stools more regular, no diarrhea.  No fevers.  She does have some mild hot flashes that are manageable.  Planning on leaving November 8 for [place] for the winter months.      ECOG performance status   0- Fully active, without restriction        Pain  Pain Score: No Pain (0)    ROS  A 14 point review of systems was obtained.  Positive findings noted in the history.  The remainder of the review of system is otherwise negative.      Oncology History/Treatment  7/2023-screening mammogram detected right breast cancer.  Sequent diagnostic mammogram and ultrasound demonstrated 0.8 x 0.6 x 0.6 cm hypoechoic mass 10 o'clock position in the right breast.  No suspicious lymph nodes in the right axilla.   -Core needle biopsy showed invasive lobular carcinoma with pleomorphic features, grade 2.  ER 90%, %, HER2 0 by IHC.    7/27/2023-bilateral breast MRI showed 2 x 1.2 x 1.1 cm right breast cancer without evidence of lymphadenopathy.    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    8/31/2023-right breast lumpectomy and right axillary sentinel lymph node biopsy.   Invasive carcinoma with mixed ductal and pleomorphic lobular features, grade 2.  2 cm.  Margins were negative.   DCIS present with closest skin margin to 1 mm.   4/4 sentinel lymph nodes were positive (2 macro  "mets and 2 micro mets) for metastatic carcinoma with largest metastatic deposit of 5 mm.  Negative for extranodal extension.   DCIS and LCIS present.  ER 90%, AK 80%, HER2 1+ by IHC.   pT1c N2a M0    9/26/2023-PET scan showed no evidence of distant metastasis    10/12/2023-right axillary lymph node dissection   1 of 20 lymph node showed metastatic adenocarcinoma, 4 mm in size.  No extranodal extension.    11/21/2023-4/23/2024-completed adjuvant chemotherapy with dose dense AC followed by weekly paclitaxel in [place].    4/24/2024-CT scan showed interval development of small pericardial effusion.  No pleural effusion.  She has seroma in the right axilla.  No next of malignancy.    7/11/2024- started anastrozole    7/22/204- started abemaciclib.  Stopped on 8/15/2024 due to intolerable and undesirable side effects of diarrhea.    9/12/2024-started ribociclib.        Physical Exam    /69   Pulse 80   Temp 97.8  F (36.6  C)   Resp 16   Ht 1.6 m (5' 3\")   Wt 71.7 kg (158 lb)   SpO2 98%   BMI 27.99 kg/m      GENERAL: no acute distress. Cooperative in conversation.   HEENT: pupils are equal, round and reactive. Oral mucosa is moist and intact.  RESP:Chest symmetric. Regular respiratory rate. No stridor.  ABD: Nondistended, soft.  EXTREMITIES: No lower extremity edema.   NEURO: non focal. Alert and oriented x3.   PSYCH: within normal limits. No depression or anxiety.  SKIN: warm dry intact      Lab Results    Recent Results (from the past 168 hour(s))   ECG 12-Lead with MUSE - [facility]   Result Value Ref Range    Systolic Blood Pressure  mmHg    Diastolic Blood Pressure  mmHg    Ventricular Rate 71 BPM    Atrial Rate 71 BPM    AK Interval 136 ms    QRS Duration 74 ms     ms    QTc 425 ms    P Axis 63 degrees    R AXIS 39 degrees    T Axis 64 degrees    Interpretation ECG       Sinus rhythm  Low voltage QRS  Borderline ECG  When compared with ECG of 26-Sep-2024 11:29,  No significant change was " found  Confirmed by JAKY MASSEY MD LOC:WW ([id number]) on 10/10/2024 2:19:59 PM     Comprehensive metabolic panel   Result Value Ref Range    Sodium 141 135 - 145 mmol/L    Potassium 4.2 3.4 - 5.3 mmol/L    Carbon Dioxide (CO2) 25 22 - 29 mmol/L    Anion Gap 9 7 - 15 mmol/L    Urea Nitrogen 10.1 8.0 - 23.0 mg/dL    Creatinine 0.78 0.51 - 0.95 mg/dL    GFR Estimate 84 >60 mL/min/1.73m2    Calcium 9.9 8.8 - 10.4 mg/dL    Chloride 107 98 - 107 mmol/L    Glucose 108 (H) 70 - 99 mg/dL    Alkaline Phosphatase 81 40 - 150 U/L    AST 46 (H) 0 - 45 U/L    ALT 46 0 - 50 U/L    Protein Total 6.0 (L) 6.4 - 8.3 g/dL    Albumin 3.8 3.5 - 5.2 g/dL    Bilirubin Total 0.2 <=1.2 mg/dL   Magnesium   Result Value Ref Range    Magnesium 1.9 1.7 - 2.3 mg/dL   Phosphorus   Result Value Ref Range    Phosphorus 3.2 2.5 - 4.5 mg/dL   CBC with platelets and differential   Result Value Ref Range    WBC Count 2.9 (L) 4.0 - 11.0 10e3/uL    RBC Count 3.92 3.80 - 5.20 10e6/uL    Hemoglobin 12.4 11.7 - 15.7 g/dL    Hematocrit 36.5 35.0 - 47.0 %    MCV 93 78 - 100 fL    MCH 31.6 26.5 - 33.0 pg    MCHC 34.0 31.5 - 36.5 g/dL    RDW 16.3 (H) 10.0 - 15.0 %    Platelet Count 168 150 - 450 10e3/uL    % Neutrophils 56 %    % Lymphocytes 31 %    % Monocytes 11 %    % Eosinophils 1 %    % Basophils 1 %    % Immature Granulocytes 0 %    NRBCs per 100 WBC 0 <1 /100    Absolute Neutrophils 1.6 1.6 - 8.3 10e3/uL    Absolute Lymphocytes 0.9 0.8 - 5.3 10e3/uL    Absolute Monocytes 0.3 0.0 - 1.3 10e3/uL    Absolute Eosinophils 0.0 0.0 - 0.7 10e3/uL    Absolute Basophils 0.0 0.0 - 0.2 10e3/uL    Absolute Immature Granulocytes 0.0 <=0.4 10e3/uL    Absolute NRBCs 0.0 10e3/uL     I reviewed the above labs today.  Imaging    MA Screening Bilateral w/ Manuela    Result Date: 9/16/2024  MM MAMMOGRAM SCREENING BILAT W 3D MANUELA W CAD performed on 9/13/24 FDA Accredited Facility: [place], [place] [id number] [phone] Compared to: 07/05/2023 MM Mammogram  "Screening Bilat W 3D Flo W CAD, 03/07/2018 MM Mammogram Screening Bilat W Flo W CAD, and 04/29/2015 MAMMOGRAM SCREENING BILATERAL   FINDINGS: Bilateral screening mammogram was performed with the assistance of Computer-Aided Detection and breast tomosynthesis. There are scattered areas of fibroglandular density. There are breast conservation changes on the right. There is no radiographic evidence of malignancy.      : ACR BI-RADS Category 2: Benign RECOMMENDATION: Follow Up Imaging in 12 months - Bilateral The results and recommendations of this examination will be communicated to the patient.       Billing  Total time 35 minutes, to include face to face visit, review of EMR, ordering, documentation and coordination of care on date of service. The longitudinal plan of care for the diagnosis(es)/condition(s) as documented were addressed during this visit. Due to the added complexity in care, I will continue to support Lizbet in the subsequent management and with ongoing continuity of care.    Signed by: SABRINA Tong CNP        Chart documentation with Dragon Voice recognition Software. Although reviewed after completion, some words and grammatical errors may remain.    Oncology Rooming Note    October 10, 2024 12:06 PM   Lizbet Nunez is a 64 year old female who presents for:    Chief Complaint   Patient presents with     Oncology Clinic Visit     Malignant neoplasm of upper-outer quadrant of right breast in female, estrogen receptor positive     Initial Vitals: /69   Pulse 80   Temp 97.8  F (36.6  C)   Resp 16   Ht 1.6 m (5' 3\")   Wt 71.7 kg (158 lb)   SpO2 98%   BMI 27.99 kg/m   Estimated body mass index is 27.99 kg/m  as calculated from the following:    Height as of this encounter: 1.6 m (5' 3\").    Weight as of this encounter: 71.7 kg (158 lb). Body surface area is 1.79 meters squared.  No Pain (0) Comment: Data Unavailable   No LMP recorded.  Allergies reviewed: Yes  Medications reviewed: " Yes    Medications: MEDICATION REFILLS NEEDED TODAY. Provider was notified.  Pharmacy name entered into OnGreen:    [id number] - [place], [place] - [address] [facility] [address]  [facility] - [place], [place] - [address] [facility] [place] - [place], [place] - [address]    Frailty Screening:   Is the patient here for a new oncology consult visit in cancer care? 2. No      Clinical concerns:  labs and follow up      Tila Quinonez                Again, thank you for allowing me to participate in the care of your patient.        Sincerely,        SABRINA Tong CNP

## 2024-10-10 NOTE — PROGRESS NOTES
[facility] Hematology and Oncology Outpatient Progress Note    Patient: Lizbet Nunez  MRN: [id number]  Date of Service: Oct 10, 2024          Reason for Visit    Chief Complaint   Patient presents with    Oncology Clinic Visit     Malignant neoplasm of upper-outer quadrant of right breast in female, estrogen receptor positive        Cancer Staging   Malignant neoplasm of upper-outer quadrant of right breast in female, estrogen receptor positive (H)  Staging form: Breast, AJCC 8th Edition  - Pathologic stage from 8/31/2023: Stage IB (pT1c, pN2a, cM0, G2, ER+, TN+, HER2-) - Signed by Tony Jarvis MD on 5/9/2024      Primary Hematologist/Oncologist: Dr. Tony Jarvis        Assessment/Plan  #.  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  ER positive, TN positive, HER2 negative (0 by IHC)  Patient is clinically stable. She reports good toleration of Kisqali and anastrozole thus far. She has notable side effects of fatigue and hot flashes. We reviewed labs today including CBC, CMP. Labs are all stable/adequate. WBC has decreased to 2.9, with normal ANC. EKG stable, QTc was 425 ms.  With good tolerationof current therapy, I do recommend to continue with plan.   Proceed with next cycle of Kisqali at current dose 400 mg daily for 21 days on and 7 days off.  Continue anastrozole daily.  Labs in 2 weeks per protocol.  Follow-up with labs and provider visit in 4 weeks. Patient states she will be transferring care to her [place] oncologist prior to next visit.  I will ask our nursing staff to help coordinate care.    #.  Bone health  She takes vitamin D and calcium.  I advised her to focus on weightbearing exercises.  I reviewed the DEXA scan from 5/2024.  It showed low bone density.  Once she is stable on adjuvant endocrine therapy, I will plan to start bone strengthener agent such as Reclast.      ______________________________________________________________________________    History of Present Illness/ Interval History    Ms. Lizbet Nunez  is a 64 year old patient who is seen today after starting Verzenio and anastrozole.  She reports that she is feeling well, though does have increased fatigue.  She has been able to nap which is helpful.  She has some muscle pain.  Normally she is constipated so this medication has made her stools more regular, no diarrhea.  No fevers.  She does have some mild hot flashes that are manageable.  Planning on leaving November 8 for [place] for the winter months.      ECOG performance status   0- Fully active, without restriction        Pain  Pain Score: No Pain (0)    ROS  A 14 point review of systems was obtained.  Positive findings noted in the history.  The remainder of the review of system is otherwise negative.      Oncology History/Treatment  7/2023-screening mammogram detected right breast cancer.  Sequent diagnostic mammogram and ultrasound demonstrated 0.8 x 0.6 x 0.6 cm hypoechoic mass 10 o'clock position in the right breast.  No suspicious lymph nodes in the right axilla.   -Core needle biopsy showed invasive lobular carcinoma with pleomorphic features, grade 2.  ER 90%, %, HER2 0 by IHC.    7/27/2023-bilateral breast MRI showed 2 x 1.2 x 1.1 cm right breast cancer without evidence of lymphadenopathy.    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    8/31/2023-right breast lumpectomy and right axillary sentinel lymph node biopsy.   Invasive carcinoma with mixed ductal and pleomorphic lobular features, grade 2.  2 cm.  Margins were negative.   DCIS present with closest skin margin to 1 mm.   4/4 sentinel lymph nodes were positive (2 macro mets and 2 micro mets) for metastatic carcinoma with largest metastatic deposit of 5 mm.  Negative for extranodal extension.   DCIS and LCIS present.  ER 90%, KS 80%, HER2 1+ by IHC.   pT1c N2a  "M0    9/26/2023-PET scan showed no evidence of distant metastasis    10/12/2023-right axillary lymph node dissection   1 of 20 lymph node showed metastatic adenocarcinoma, 4 mm in size.  No extranodal extension.    11/21/2023-4/23/2024-completed adjuvant chemotherapy with dose dense AC followed by weekly paclitaxel in [place].    4/24/2024-CT scan showed interval development of small pericardial effusion.  No pleural effusion.  She has seroma in the right axilla.  No next of malignancy.    7/11/2024- started anastrozole    7/22/204- started abemaciclib.  Stopped on 8/15/2024 due to intolerable and undesirable side effects of diarrhea.    9/12/2024-started ribociclib.        Physical Exam    /69   Pulse 80   Temp 97.8  F (36.6  C)   Resp 16   Ht 1.6 m (5' 3\")   Wt 71.7 kg (158 lb)   SpO2 98%   BMI 27.99 kg/m      GENERAL: no acute distress. Cooperative in conversation.   HEENT: pupils are equal, round and reactive. Oral mucosa is moist and intact.  RESP:Chest symmetric. Regular respiratory rate. No stridor.  ABD: Nondistended, soft.  EXTREMITIES: No lower extremity edema.   NEURO: non focal. Alert and oriented x3.   PSYCH: within normal limits. No depression or anxiety.  SKIN: warm dry intact      Lab Results    Recent Results (from the past 168 hour(s))   ECG 12-Lead with MUSE - [facility]   Result Value Ref Range    Systolic Blood Pressure  mmHg    Diastolic Blood Pressure  mmHg    Ventricular Rate 71 BPM    Atrial Rate 71 BPM    KS Interval 136 ms    QRS Duration 74 ms     ms    QTc 425 ms    P Axis 63 degrees    R AXIS 39 degrees    T Axis 64 degrees    Interpretation ECG       Sinus rhythm  Low voltage QRS  Borderline ECG  When compared with ECG of 26-Sep-2024 11:29,  No significant change was found  Confirmed by JAKY MASSEY MD LOC:WW ([id number]) on 10/10/2024 2:19:59 PM     Comprehensive metabolic panel   Result Value Ref Range    Sodium 141 135 - 145 mmol/L    Potassium 4.2 3.4 - 5.3 mmol/L "    Carbon Dioxide (CO2) 25 22 - 29 mmol/L    Anion Gap 9 7 - 15 mmol/L    Urea Nitrogen 10.1 8.0 - 23.0 mg/dL    Creatinine 0.78 0.51 - 0.95 mg/dL    GFR Estimate 84 >60 mL/min/1.73m2    Calcium 9.9 8.8 - 10.4 mg/dL    Chloride 107 98 - 107 mmol/L    Glucose 108 (H) 70 - 99 mg/dL    Alkaline Phosphatase 81 40 - 150 U/L    AST 46 (H) 0 - 45 U/L    ALT 46 0 - 50 U/L    Protein Total 6.0 (L) 6.4 - 8.3 g/dL    Albumin 3.8 3.5 - 5.2 g/dL    Bilirubin Total 0.2 <=1.2 mg/dL   Magnesium   Result Value Ref Range    Magnesium 1.9 1.7 - 2.3 mg/dL   Phosphorus   Result Value Ref Range    Phosphorus 3.2 2.5 - 4.5 mg/dL   CBC with platelets and differential   Result Value Ref Range    WBC Count 2.9 (L) 4.0 - 11.0 10e3/uL    RBC Count 3.92 3.80 - 5.20 10e6/uL    Hemoglobin 12.4 11.7 - 15.7 g/dL    Hematocrit 36.5 35.0 - 47.0 %    MCV 93 78 - 100 fL    MCH 31.6 26.5 - 33.0 pg    MCHC 34.0 31.5 - 36.5 g/dL    RDW 16.3 (H) 10.0 - 15.0 %    Platelet Count 168 150 - 450 10e3/uL    % Neutrophils 56 %    % Lymphocytes 31 %    % Monocytes 11 %    % Eosinophils 1 %    % Basophils 1 %    % Immature Granulocytes 0 %    NRBCs per 100 WBC 0 <1 /100    Absolute Neutrophils 1.6 1.6 - 8.3 10e3/uL    Absolute Lymphocytes 0.9 0.8 - 5.3 10e3/uL    Absolute Monocytes 0.3 0.0 - 1.3 10e3/uL    Absolute Eosinophils 0.0 0.0 - 0.7 10e3/uL    Absolute Basophils 0.0 0.0 - 0.2 10e3/uL    Absolute Immature Granulocytes 0.0 <=0.4 10e3/uL    Absolute NRBCs 0.0 10e3/uL     I reviewed the above labs today.  Imaging    MA Screening Bilateral w/ Manuela    Result Date: 9/16/2024  MM MAMMOGRAM SCREENING BILAT W 3D MANUELA W CAD performed on 9/13/24 FDA Accredited Facility: [place] [address] [phone] Compared to: 07/05/2023 MM Mammogram Screening Bilat W 3D Manuela W CAD, 03/07/2018 MM Mammogram Screening Bilat W Manuela W CAD, and 04/29/2015 MAMMOGRAM SCREENING BILATERAL   FINDINGS: Bilateral screening mammogram was performed with the  assistance of Computer-Aided Detection and breast tomosynthesis. There are scattered areas of fibroglandular density. There are breast conservation changes on the right. There is no radiographic evidence of malignancy.      : ACR BI-RADS Category 2: Benign RECOMMENDATION: Follow Up Imaging in 12 months - Bilateral The results and recommendations of this examination will be communicated to the patient.       Billing  Total time 35 minutes, to include face to face visit, review of EMR, ordering, documentation and coordination of care on date of service. The longitudinal plan of care for the diagnosis(es)/condition(s) as documented were addressed during this visit. Due to the added complexity in care, I will continue to support Lizbet in the subsequent management and with ongoing continuity of care.    Signed by: SABRINA Tong CNP        Chart documentation with Dragon Voice recognition Software. Although reviewed after completion, some words and grammatical errors may remain.

## 2024-10-14 DIAGNOSIS — Z17.0 MALIGNANT NEOPLASM OF UPPER-OUTER QUADRANT OF RIGHT BREAST IN FEMALE, ESTROGEN RECEPTOR POSITIVE (H): Primary | ICD-10-CM

## 2024-10-14 DIAGNOSIS — Z51.81 ENCOUNTER FOR MONITORING AROMATASE INHIBITOR THERAPY: ICD-10-CM

## 2024-10-14 DIAGNOSIS — C50.411 MALIGNANT NEOPLASM OF UPPER-OUTER QUADRANT OF RIGHT BREAST IN FEMALE, ESTROGEN RECEPTOR POSITIVE (H): Primary | ICD-10-CM

## 2024-10-14 DIAGNOSIS — Z79.811 ENCOUNTER FOR MONITORING AROMATASE INHIBITOR THERAPY: ICD-10-CM

## 2024-10-17 ENCOUNTER — THERAPY VISIT (OUTPATIENT)
Dept: PHYSICAL THERAPY | Facility: REHABILITATION | Age: 64
End: 2024-10-17
Payer: COMMERCIAL

## 2024-10-17 DIAGNOSIS — I89.0 LYMPHEDEMA: ICD-10-CM

## 2024-10-17 DIAGNOSIS — M25.611 DECREASED RANGE OF MOTION OF RIGHT SHOULDER: ICD-10-CM

## 2024-10-17 DIAGNOSIS — C50.411 MALIGNANT NEOPLASM OF UPPER-OUTER QUADRANT OF RIGHT BREAST IN FEMALE, ESTROGEN RECEPTOR POSITIVE (H): Primary | ICD-10-CM

## 2024-10-17 DIAGNOSIS — Z17.0 MALIGNANT NEOPLASM OF UPPER-OUTER QUADRANT OF RIGHT BREAST IN FEMALE, ESTROGEN RECEPTOR POSITIVE (H): Primary | ICD-10-CM

## 2024-10-17 PROCEDURE — 97140 MANUAL THERAPY 1/> REGIONS: CPT | Mod: GP | Performed by: PHYSICAL THERAPIST

## 2024-10-17 PROCEDURE — 97110 THERAPEUTIC EXERCISES: CPT | Mod: GP | Performed by: PHYSICAL THERAPIST

## 2024-10-24 ENCOUNTER — TELEPHONE (OUTPATIENT)
Dept: ONCOLOGY | Facility: HOSPITAL | Age: 64
End: 2024-10-24

## 2024-10-24 ENCOUNTER — PATIENT OUTREACH (OUTPATIENT)
Dept: ONCOLOGY | Facility: HOSPITAL | Age: 64
End: 2024-10-24

## 2024-10-24 ENCOUNTER — MYC MEDICAL ADVICE (OUTPATIENT)
Dept: PHARMACY | Facility: CLINIC | Age: 64
End: 2024-10-24

## 2024-10-24 NOTE — ORAL ONC MGMT
"Oral Chemotherapy Monitoring Program   Left Voicemail    Attempted to contact patient today for follow up regarding oral chemotherapy, ribociclib, for routine assessment and let her know about lab results. No answer. Left voicemail for patient to call us back at [phone] when able. No medication name was left.  Did send her a Jingdong message as well letting her know her labs had no concerning results- however in hindsight realize I was looking at labs from 10/10/24 not 10/24/24.  Raji Rowell, PharmD, BCOP  Oral Chemotherapy Pharmacist  [facility] [place] Phone: [phone]  In Basket Pools: \"[facility] ORAL CHEMOTHERAPY PHARMACIST\" or \"[facility] ORAL CHEMOTHERAPY PHARMACIST\"  October 24, 2024       "

## 2024-10-24 NOTE — PROGRESS NOTES
[facility]: Cancer Care                                                                                          Patient due for CBC, CMP today. Patient canceled appointment via [employer]. No reason given.  Patient on ribociclib so needs labs checked.  Called patient and left message to call back to reschedule canceled lab appointment. No patient identifiers left.    10/28/24:  No return call from patient and patient didn't call back to schedule lab appointment. [employer] message sent to patient to reschedule lab appointment.  Also asked when patient she will be leaving for [place], when she will be returning, and if she has established care in [place].     10/29/24:  No return call from patient.  [employer] message read on 10/28/24 [id number].    Message sent to Oral Chemo Pharmacy Team that patient needs labs before ribociclib refill can be sent.     10/31/24:  Patient scheduled for labs on 11/1/24. Brenda Tasha said she will monitor for results and review with Dr. Jarvis to see what she wants to do given lack of response and plans to go to [place].    Signature:  Laverne Green RN

## 2024-10-29 NOTE — ORAL ONC MGMT
"Message was sent in error as patient did NOT have labs drawn on 10/24/24. RNCC aware and has been working on getting patient rescheduled for labs prior to her moving to [place].  Raji Rowell, PharmD, [facility]  Oral Chemotherapy Pharmacist  [facility] Phone: [phone]  In Basket Pools: \"[facility] ORAL CHEMOTHERAPY PHARMACIST\" or \"[facility] ORAL CHEMOTHERAPY PHARMACIST\"  October 29, 2024    "

## 2024-11-01 ENCOUNTER — OFFICE VISIT (OUTPATIENT)
Dept: RADIATION ONCOLOGY | Facility: CLINIC | Age: 64
End: 2024-11-01
Attending: INTERNAL MEDICINE
Payer: COMMERCIAL

## 2024-11-01 ENCOUNTER — LAB (OUTPATIENT)
Dept: INFUSION THERAPY | Facility: HOSPITAL | Age: 64
End: 2024-11-01
Attending: INTERNAL MEDICINE
Payer: COMMERCIAL

## 2024-11-01 ENCOUNTER — TELEPHONE (OUTPATIENT)
Dept: ONCOLOGY | Facility: HOSPITAL | Age: 64
End: 2024-11-01

## 2024-11-01 VITALS
OXYGEN SATURATION: 98 % | RESPIRATION RATE: 16 BRPM | SYSTOLIC BLOOD PRESSURE: 135 MMHG | HEART RATE: 76 BPM | TEMPERATURE: 97.6 F | WEIGHT: 155.5 LBS | BODY MASS INDEX: 27.55 KG/M2 | DIASTOLIC BLOOD PRESSURE: 63 MMHG

## 2024-11-01 DIAGNOSIS — Z51.81 ENCOUNTER FOR MONITORING AROMATASE INHIBITOR THERAPY: ICD-10-CM

## 2024-11-01 DIAGNOSIS — Z79.811 ENCOUNTER FOR MONITORING AROMATASE INHIBITOR THERAPY: ICD-10-CM

## 2024-11-01 DIAGNOSIS — Z17.0 MALIGNANT NEOPLASM OF UPPER-OUTER QUADRANT OF RIGHT BREAST IN FEMALE, ESTROGEN RECEPTOR POSITIVE (H): Primary | ICD-10-CM

## 2024-11-01 DIAGNOSIS — C50.411 MALIGNANT NEOPLASM OF UPPER-OUTER QUADRANT OF RIGHT BREAST IN FEMALE, ESTROGEN RECEPTOR POSITIVE (H): Primary | ICD-10-CM

## 2024-11-01 DIAGNOSIS — C50.411 MALIGNANT NEOPLASM OF UPPER-OUTER QUADRANT OF RIGHT BREAST IN FEMALE, ESTROGEN RECEPTOR POSITIVE (H): ICD-10-CM

## 2024-11-01 DIAGNOSIS — Z17.0 MALIGNANT NEOPLASM OF UPPER-OUTER QUADRANT OF RIGHT BREAST IN FEMALE, ESTROGEN RECEPTOR POSITIVE (H): ICD-10-CM

## 2024-11-01 LAB
ALBUMIN SERPL BCG-MCNC: 3.7 G/DL (ref 3.5–5.2)
ALP SERPL-CCNC: 74 U/L (ref 40–150)
ALT SERPL W P-5'-P-CCNC: 43 U/L (ref 0–50)
ANION GAP SERPL CALCULATED.3IONS-SCNC: 12 MMOL/L (ref 7–15)
AST SERPL W P-5'-P-CCNC: 34 U/L (ref 0–45)
BASOPHILS # BLD AUTO: 0 10E3/UL (ref 0–0.2)
BASOPHILS NFR BLD AUTO: 1 %
BILIRUB SERPL-MCNC: 0.5 MG/DL
BUN SERPL-MCNC: 12.7 MG/DL (ref 8–23)
CALCIUM SERPL-MCNC: 10.1 MG/DL (ref 8.8–10.4)
CHLORIDE SERPL-SCNC: 102 MMOL/L (ref 98–107)
CREAT SERPL-MCNC: 0.91 MG/DL (ref 0.51–0.95)
EGFRCR SERPLBLD CKD-EPI 2021: 70 ML/MIN/1.73M2
EOSINOPHIL # BLD AUTO: 0.1 10E3/UL (ref 0–0.7)
EOSINOPHIL NFR BLD AUTO: 4 %
ERYTHROCYTE [DISTWIDTH] IN BLOOD BY AUTOMATED COUNT: 13.3 % (ref 10–15)
GLUCOSE SERPL-MCNC: 127 MG/DL (ref 70–99)
HCO3 SERPL-SCNC: 25 MMOL/L (ref 22–29)
HCT VFR BLD AUTO: 40 % (ref 35–47)
HGB BLD-MCNC: 13.8 G/DL (ref 11.7–15.7)
IMM GRANULOCYTES # BLD: 0 10E3/UL
IMM GRANULOCYTES NFR BLD: 0 %
LYMPHOCYTES # BLD AUTO: 1.2 10E3/UL (ref 0.8–5.3)
LYMPHOCYTES NFR BLD AUTO: 35 %
MAGNESIUM SERPL-MCNC: 1.9 MG/DL (ref 1.7–2.3)
MCH RBC QN AUTO: 31.7 PG (ref 26.5–33)
MCHC RBC AUTO-ENTMCNC: 34.5 G/DL (ref 31.5–36.5)
MCV RBC AUTO: 92 FL (ref 78–100)
MONOCYTES # BLD AUTO: 0.3 10E3/UL (ref 0–1.3)
MONOCYTES NFR BLD AUTO: 9 %
NEUTROPHILS # BLD AUTO: 1.7 10E3/UL (ref 1.6–8.3)
NEUTROPHILS NFR BLD AUTO: 50 %
NRBC # BLD AUTO: 0 10E3/UL
NRBC BLD AUTO-RTO: 0 /100
PHOSPHATE SERPL-MCNC: 3.5 MG/DL (ref 2.5–4.5)
PLATELET # BLD AUTO: 163 10E3/UL (ref 150–450)
POTASSIUM SERPL-SCNC: 4.2 MMOL/L (ref 3.4–5.3)
PROT SERPL-MCNC: 6.5 G/DL (ref 6.4–8.3)
RBC # BLD AUTO: 4.35 10E6/UL (ref 3.8–5.2)
SODIUM SERPL-SCNC: 139 MMOL/L (ref 135–145)
WBC # BLD AUTO: 3.3 10E3/UL (ref 4–11)

## 2024-11-01 PROCEDURE — 99214 OFFICE O/P EST MOD 30 MIN: CPT | Performed by: STUDENT IN AN ORGANIZED HEALTH CARE EDUCATION/TRAINING PROGRAM

## 2024-11-01 PROCEDURE — 84100 ASSAY OF PHOSPHORUS: CPT

## 2024-11-01 PROCEDURE — 83735 ASSAY OF MAGNESIUM: CPT

## 2024-11-01 PROCEDURE — 36415 COLL VENOUS BLD VENIPUNCTURE: CPT

## 2024-11-01 PROCEDURE — 80053 COMPREHEN METABOLIC PANEL: CPT

## 2024-11-01 PROCEDURE — 85004 AUTOMATED DIFF WBC COUNT: CPT

## 2024-11-01 ASSESSMENT — PAIN SCALES - GENERAL: PAINLEVEL_OUTOF10: SEVERE PAIN (6)

## 2024-11-01 NOTE — PROGRESS NOTES
[facility] Radiation Oncology Follow Up     Patient: Lizbet Nunez  MRN: [id number]  Date of Service: 11/01/2024       DISEASE TREATED:  63 year old female with a diagnosis of right breast invasive ductal and lobular carcinoma, stage IIIA (pT1c, pN2a cM0)  HR+; HER2- s/p right lumpectomy and sentinel lymph node biopsy 8/31/23 with negative margins and 4 of 4 lymph nodes positive followed by axillary dissection with an additional 1 of 20 lymph nodes positive. She completed adjuvant chemotherapy in [place]        TYPE OF RADIATION THERAPY ADMINISTERED:    SITE TREATED: Right breast and regional lymph nodes  TOTAL DOSE: 4256 cGy +1000 cGy boost  NUMBER OF FRACTIONS: 16+4 boost  DATES COMPLETED: 5/28/2024 - 6/24/2024  CONCURRENT CHEMOTHERAPY: No  ADJUVANT THERAPY:Yes: endocrine therapy (anastrozole) and CDK 4/6 inhibitor      INTERVAL SINCE COMPLETION OF RADIATION THERAPY: 5 mo      SUBJECTIVE:  Ms. Nunez is a 64 year old female who who had an abnormal screening mammogram     7/11/2023 ultrasound-guided core needle biopsy: 10 o'clock position in the right breast 7 cm from the nipple, pathology ([id number]): invasive lobular carcinoma, grade 2.  Associated LCIS.  ER positive (90%), AR positive (100%) HER2/kenny negative.     7/21/2023 genetics, [employer]: Positive for 1 variant of uncertain significance, BAP 1 C8.1857C> T     7/27/2023 MRI: 2 x 1.2 x 1.1 cm irregularly enhancing mass in the right breast 10:00 middle depth.  No other suspicious areas or enlarged lymph nodes.     8/31/2023 right breast lumpectomy with sentinel lymph node biopsy, pathology ([id number]):  Invasive carcinoma with mixed ductal and pleomorphic lobular features, grade 2, 2 cm.  Margins negative (4 mm anterior and medial).  Associated DCIS, grade 2 cribriform.  No EIC.  Associated LCIS.  Margins negative (1 mm posterior to DCIS).  No lymphovascular invasion.    Right axillary sentinel lymph node biopsy: 4 of 4 lymph nodes positive (4/4),  largest 5 mm.  No extranodal extension.  2 lymph nodes with macrometastases, 2 lymph nodes with micrometastases.  pT1c pN2a     9/26/2023 PET/CT: Postoperative changes upper outer right breast with seroma measuring 2.6 x 3.7 x 3.1 cm.  Postoperative changes right axilla with low-level inflammatory uptake.  No evidence of locally recurrent residual malignancy.     10/12/2023 right axillary dissection, pathology ([id number]): Metastatic adenocarcinoma involving 1 of 20 lymph nodes.  4 mm in size.  No extranodal extension.     Referred to lymphedema therapy but never went.  Spent winter in [place]:  11/21/2023 - 4/16/2024 adjuvant chemotherapy: AC Dose-dense Taxol      4/24/2024 CT: Postsurgical changes right breast and axilla.  No adenopathy.  Thin-walled fluid collection right axilla likely representing postsurgical seroma.  Small pericardial effusion.     5/28/2024 - 6/24/2024 adjuvant radiation therapy right breast and regional lymph nodes 4256 cGy in 16 fractions +1000 cGy in 4 fraction boost     Adjuvant on anastrozole and Abemaciclib     Returns to clinic today for routine follow-up visit.  She is doing well overall but having fatigue, hot flashes and muscle aches.  She ahs tightiness in the right axilla that has been ongoing.  She is working with PT weekly which is beneficial.  No swelling of chest or upper extremity. She has skin dryness in general for which she uses moisturizer daily.    Medications were reviewed and are up to date on EPIC.    The following portions of the patient's history were reviewed and updated as appropriate: allergies, current medications, past family history, past medical history, past social history, past surgical history and problem list.    Review of Systems:      General  Constitutional  Constitutional (WDL): Exceptions to WDL  Fatigue: Fatigue relieved by rest  Hot Flashes: Mild symptoms OR intervention not indicated  EENT  Eye Disorders  Eye Disorder (WDL): All eye disorder  elements are within defined limits (wears glasses)  Ear Disorders  Ear Disorder (WDL): Assessment not pertinent to visit  Respiratory  Respiratory  Respiratory (WDL): All respiratory elements are within defined limits  Cardiovascular  Cardiovascular  Cardiovascular (WDL): All cardiovascular elements are within defined limits (no pacemaker)  Gastrointestinal  Gastrointestinal  Gastrointestinal (WDL): All gastrointestinal elements are within defined limits  Musculoskeletal  Musculoskeletal and Connective Tissue Disorders  Musculoskeletal & Connective (WDL): Exceptions to WDL  Myalgia: Mild pain (generalized muscle aches with hormone therapy)  Integumentary  Integumentary  Integumentary (WDL): All integumentary elements are within defined limits  Alopecia: Absent or within normal limits  Neurological  Neurosensory  Neurosensory (WDL): Exceptions to WDL  Peripheral Motor Neuropathy: Asymptomatic OR clinical or diagnostic observations only (hands and feet)  Ataxia: Asymptomatic OR clinical or diagnostic observations only OR intervention not indicated  Peripheral Sensory Neuropathy: Asymptomatic (hands and feet)  Genitourinary/Reproductive  Genitourinary  Genitourinary (WDL): Exceptions to WDL  Urinary Frequency: Present (nocturia x2)  Lymphatic  Lymph System Disorders  Lymph (WDL): All lymph elements are within defined limits  Pain  Pain Score: Severe Pain (6)  AUA Assessment                                                              Accompanied by  Accompanied By: self only    Objective:     PHYSICAL EXAMINATION:    /63 (BP Location: Right arm, Patient Position: Sitting, Cuff Size: Adult Regular)   Pulse 76   Temp 97.6  F (36.4  C) (Oral)   Resp 16   Wt 70.5 kg (155 lb 8 oz)   SpO2 98%   BMI 27.55 kg/m      Gen: Alert, in NAD pleasant interactive, well nourished  Eyes: \EOMI, sclera anicteric  HENT     Head: NC/AT  Chest: Breast symmetry is stable post radiation.  She has no RT dermatitis, minimal residual  hyperpigmentation. Good ROM, right upper extremity lymphedema stable since initial consult.  No other acute or subacute sequelae of her radiation.   Musculoskeletal: Normal muscle bulk and tone  Skin: Normal tone and turgor, warm, dry, intact  Neurologic: A/Ox3, face symmetric, speech fluent, no focal motor deficits, gait normal and unaided  Psychiatric: Appropriate mood and affect    Imaging: Imaging results 6 weeks:No results found.     Impression   63 year old female with a diagnosis of right breast invasive ductal and lobular carcinoma, stage IIIA (pT1c, pN2a cM0)  HR+; HER2- s/p right lumpectomy and sentinel lymph node biopsy 8/31/23 with negative margins and 4 of 4 lymph nodes positive followed by axillary dissection with an additional 1 of 20 lymph nodes positive. She completed adjuvant chemotherapy in [place] and adjuvant radiation therapy here.     Recovering from acute side effects of radiation.    Visit Dx:    (C50.411,  Z17.0) Malignant neoplasm of upper-outer quadrant of right breast in female, estrogen receptor positive (H)  (primary encounter diagnosis)      Cancer Staging   Malignant neoplasm of upper-outer quadrant of right breast in female, estrogen receptor positive (H)  Staging form: Breast, AJCC 8th Edition  - Pathologic stage from 8/31/2023: Stage IB (pT1c, pN2a, cM0, G2, ER+, TN+, HER2-) - Signed by Tony Jarvis MD on 5/9/2024      Assessment & Plan:   1.  She is returning to [place] for winter in a couple weeks and will follow-up with medical oncology.  2.  Continue skin care  3.  Continue range of motion exercises and PT  4. Mammogram indicated-12-months-next in September 2025  5.  Discussed scheduled follow-up with us orfollow-up with return to clinic as needed patient prefers to contact us should any questions or concerns develop in the future to schedule an appointment.     Pain Management Plan: N/A    Total time of this visit, including time spent face-to-face with patient and or via  video/audio, and also in preparing for today's visit for MDM and documentation. Medical decision-making included consideration and possible diagnoses, management options, complex record review, review of diagnostic tests and information, consideration and discussion of significant complications based on comorbidities, discussion with providers involved in the care of the patient.     30 Minutes spent.     This note has been dictated using voice recognition software and as a result may contain minor grammatical errors and unintended word substitutions.      Sherri Jacob MD  Department of Radiation Oncology   [facility] Radiation Oncology  Tel: [phone]  Page: [phone]    [facility]  [address] [place] [address]     [address] [place] [address]    CC:  Patient Care Team:  Leonie Manning MD as PCP - General (Family Medicine)  Tony Jarvis MD as MD (Hematology)  Sherri Jacob MD as MD (Radiation Oncology)  Sherri Jacob MD as Assigned Cancer Care Provider  Laverne Green RN as Specialty Care Coordinator (Hematology & Oncology)

## 2024-11-01 NOTE — ORAL ONC MGMT
"Oral Chemotherapy Monitoring Program  Lab Follow Up    Patient currently on ribociclib therapy for breast cancer.    Reviewed lab results from today.    No concerning abnormalities.    Assessment & Plan:  Sent inbasket to care team to determine if they want to send in a refill of ribociclib. Patient leaving to [place] for the winter. Unknown what continued care looks like for her since she has been unresponsive.    Follow-Up:  Pending inbasket response.    Brenda Cordova, PharmD, BCOP  Oral Chemotherapy Pharmacist  [facility] [place] Phone: [phone]  In Basket Pools: \"[facility] ORAL CHEMOTHERAPY PHARMACIST\" or \"[facility] ORAL CHEMOTHERAPY PHARMACIST\"  November 1, 2024      "

## 2024-11-01 NOTE — PROGRESS NOTES
"Oncology Rooming Note    November 1, 2024 2:22 PM   Lizbet Nunez is a 64 year old female who presents for:    Chief Complaint   Patient presents with    Oncology Clinic Visit     Follow up with Dr. Jacob     Initial Vitals: /63 (BP Location: Right arm, Patient Position: Sitting, Cuff Size: Adult Regular)   Pulse 76   Temp 97.6  F (36.4  C) (Oral)   Resp 16   Wt 70.5 kg (155 lb 8 oz)   SpO2 98%   BMI 27.55 kg/m   Estimated body mass index is 27.55 kg/m  as calculated from the following:    Height as of 10/10/24: 1.6 m (5' 3\").    Weight as of this encounter: 70.5 kg (155 lb 8 oz). Body surface area is 1.77 meters squared.  Severe Pain (6) Comment: Data Unavailable   No LMP recorded.  Allergies reviewed: Yes  Medications reviewed: Yes    Medications: Medication refills not needed today.  Pharmacy name entered into daysoft:    [id number] [place], [place] - [address] [facility] [place], [place] - [address] [facility] [place], [place] - [address]    Frailty Screening:   Is the patient here for a new oncology consult visit in cancer care? 2. No      Clinical concerns: Patient here ambulatory for follow-up status post radiation for her breast cancer.  Patient had her mammograms done in September 2024.  Patient states that she will be going to [place] for the winter in a couple of weeks.  Patient states she continues to have cording and is continued with her physical therapy.  Plan return to clinic for follow-up as directed by provider.   Dr. Jacob was notified.      Valerie Stephenson RN              "

## 2024-11-01 NOTE — LETTER
"11/1/2024      Lizbet Nunez  [address] [place] [address]      Dear Colleague,    Thank you for referring your patient, Lizbet Nunez, to the [facility] RADIATION ONCOLOGY [place]. Please see a copy of my visit note below.    Oncology Rooming Note    November 1, 2024 2:22 PM   Lizbet Nunez is a 64 year old female who presents for:    Chief Complaint   Patient presents with     Oncology Clinic Visit     Follow up with Dr. Jacob     Initial Vitals: /63 (BP Location: Right arm, Patient Position: Sitting, Cuff Size: Adult Regular)   Pulse 76   Temp 97.6  F (36.4  C) (Oral)   Resp 16   Wt 70.5 kg (155 lb 8 oz)   SpO2 98%   BMI 27.55 kg/m   Estimated body mass index is 27.55 kg/m  as calculated from the following:    Height as of 10/10/24: 1.6 m (5' 3\").    Weight as of this encounter: 70.5 kg (155 lb 8 oz). Body surface area is 1.77 meters squared.  Severe Pain (6) Comment: Data Unavailable   No LMP recorded.  Allergies reviewed: Yes  Medications reviewed: Yes    Medications: Medication refills not needed today.  Pharmacy name entered into [employer]:    [id number] - [place], [place] - [address] AT [facility] - [place], [place] - [address] [facility] [place], [place] - [address] [facility] [address]    Frailty Screening:   Is the patient here for a new oncology consult visit in cancer care? 2. No      Clinical concerns: Patient here ambulatory for follow-up status post radiation for her breast cancer.  Patient had her mammograms done in September 2024.  Patient states that she will be going to [place] for the winter in a couple of weeks.  Patient states she continues to have cording and is continued with her physical therapy.  Plan return to clinic for follow-up as directed by provider.   Dr. Jacob was notified.      Valerie Stephenson, RN                [facility] Radiation Oncology Follow Up     Patient: Lizbet LAMA" Mayra  MRN: [id number]  Date of Service: 11/01/2024       DISEASE TREATED:  63 year old female with a diagnosis of right breast invasive ductal and lobular carcinoma, stage IIIA (pT1c, pN2a cM0)  HR+; HER2- s/p right lumpectomy and sentinel lymph node biopsy 8/31/23 with negative margins and 4 of 4 lymph nodes positive followed by axillary dissection with an additional 1 of 20 lymph nodes positive. She completed adjuvant chemotherapy in [place]        TYPE OF RADIATION THERAPY ADMINISTERED:    SITE TREATED: Right breast and regional lymph nodes  TOTAL DOSE: 4256 cGy +1000 cGy boost  NUMBER OF FRACTIONS: 16+4 boost  DATES COMPLETED: 5/28/2024 - 6/24/2024  CONCURRENT CHEMOTHERAPY: No  ADJUVANT THERAPY:Yes: endocrine therapy (anastrozole) and CDK 4/6 inhibitor      INTERVAL SINCE COMPLETION OF RADIATION THERAPY: 5 mo      SUBJECTIVE:  Ms. Nunez is a 64 year old female who who had an abnormal screening mammogram     7/11/2023 ultrasound-guided core needle biopsy: 10 o'clock position in the right breast 7 cm from the nipple, pathology ([id number]): invasive lobular carcinoma, grade 2.  Associated LCIS.  ER positive (90%), WA positive (100%) HER2/kenny negative.     7/21/2023 genetics, [facility]: Positive for 1 variant of uncertain significance, BAP 1 C8.1857C> T     7/27/2023 MRI: 2 x 1.2 x 1.1 cm irregularly enhancing mass in the right breast 10:00 middle depth.  No other suspicious areas or enlarged lymph nodes.     8/31/2023 right breast lumpectomy with sentinel lymph node biopsy, pathology ([id number]):  Invasive carcinoma with mixed ductal and pleomorphic lobular features, grade 2, 2 cm.  Margins negative (4 mm anterior and medial).  Associated DCIS, grade 2 cribriform.  No EIC.  Associated LCIS.  Margins negative (1 mm posterior to DCIS).  No lymphovascular invasion.    Right axillary sentinel lymph node biopsy: 4 of 4 lymph nodes positive (4/4), largest 5 mm.  No extranodal extension.  2 lymph nodes with  macrometastases, 2 lymph nodes with micrometastases.  pT1c pN2a     9/26/2023 PET/CT: Postoperative changes upper outer right breast with seroma measuring 2.6 x 3.7 x 3.1 cm.  Postoperative changes right axilla with low-level inflammatory uptake.  No evidence of locally recurrent residual malignancy.     10/12/2023 right axillary dissection, pathology ([id number]): Metastatic adenocarcinoma involving 1 of 20 lymph nodes.  4 mm in size.  No extranodal extension.     Referred to lymphedema therapy but never went.  Spent winter in [place]:  11/21/2023 - 4/16/2024 adjuvant chemotherapy: AC Dose-dense Taxol      4/24/2024 CT: Postsurgical changes right breast and axilla.  No adenopathy.  Thin-walled fluid collection right axilla likely representing postsurgical seroma.  Small pericardial effusion.     5/28/2024 - 6/24/2024 adjuvant radiation therapy right breast and regional lymph nodes 4256 cGy in 16 fractions +1000 cGy in 4 fraction boost     Adjuvant on anastrozole and Abemaciclib     Returns to clinic today for routine follow-up visit.  She is doing well overall but having fatigue, hot flashes and muscle aches.  She ahs tightiness in the right axilla that has been ongoing.  She is working with PT weekly which is beneficial.  No swelling of chest or upper extremity. She has skin dryness in general for which she uses moisturizer daily.    Medications were reviewed and are up to date on EPIC.    The following portions of the patient's history were reviewed and updated as appropriate: allergies, current medications, past family history, past medical history, past social history, past surgical history and problem list.    Review of Systems:      General  Constitutional  Constitutional (WDL): Exceptions to WDL  Fatigue: Fatigue relieved by rest  Hot Flashes: Mild symptoms OR intervention not indicated  EENT  Eye Disorders  Eye Disorder (WDL): All eye disorder elements are within defined limits (wears glasses)  Ear  Disorders  Ear Disorder (WDL): Assessment not pertinent to visit  Respiratory  Respiratory  Respiratory (WDL): All respiratory elements are within defined limits  Cardiovascular  Cardiovascular  Cardiovascular (WDL): All cardiovascular elements are within defined limits (no pacemaker)  Gastrointestinal  Gastrointestinal  Gastrointestinal (WDL): All gastrointestinal elements are within defined limits  Musculoskeletal  Musculoskeletal and Connective Tissue Disorders  Musculoskeletal & Connective (WDL): Exceptions to WDL  Myalgia: Mild pain (generalized muscle aches with hormone therapy)  Integumentary  Integumentary  Integumentary (WDL): All integumentary elements are within defined limits  Alopecia: Absent or within normal limits  Neurological  Neurosensory  Neurosensory (WDL): Exceptions to WDL  Peripheral Motor Neuropathy: Asymptomatic OR clinical or diagnostic observations only (hands and feet)  Ataxia: Asymptomatic OR clinical or diagnostic observations only OR intervention not indicated  Peripheral Sensory Neuropathy: Asymptomatic (hands and feet)  Genitourinary/Reproductive  Genitourinary  Genitourinary (WDL): Exceptions to WDL  Urinary Frequency: Present (nocturia x2)  Lymphatic  Lymph System Disorders  Lymph (WDL): All lymph elements are within defined limits  Pain  Pain Score: Severe Pain (6)  AUA Assessment                                                              Accompanied by  Accompanied By: self only    Objective:     PHYSICAL EXAMINATION:    /63 (BP Location: Right arm, Patient Position: Sitting, Cuff Size: Adult Regular)   Pulse 76   Temp 97.6  F (36.4  C) (Oral)   Resp 16   Wt 70.5 kg (155 lb 8 oz)   SpO2 98%   BMI 27.55 kg/m      Gen: Alert, in NAD pleasant interactive, well nourished  Eyes: \EOMI, sclera anicteric  HENT     Head: NC/AT  Chest: Breast symmetry is stable post radiation.  She has no RT dermatitis, minimal residual hyperpigmentation. Good ROM, right upper extremity  lymphedema stable since initial consult.  No other acute or subacute sequelae of her radiation.   Musculoskeletal: Normal muscle bulk and tone  Skin: Normal tone and turgor, warm, dry, intact  Neurologic: A/Ox3, face symmetric, speech fluent, no focal motor deficits, gait normal and unaided  Psychiatric: Appropriate mood and affect    Imaging: Imaging results 6 weeks:No results found.     Impression   63 year old female with a diagnosis of right breast invasive ductal and lobular carcinoma, stage IIIA (pT1c, pN2a cM0)  HR+; HER2- s/p right lumpectomy and sentinel lymph node biopsy 8/31/23 with negative margins and 4 of 4 lymph nodes positive followed by axillary dissection with an additional 1 of 20 lymph nodes positive. She completed adjuvant chemotherapy in [place] and adjuvant radiation therapy here.     Recovering from acute side effects of radiation.    Visit Dx:    (C50.411,  Z17.0) Malignant neoplasm of upper-outer quadrant of right breast in female, estrogen receptor positive (H)  (primary encounter diagnosis)      Cancer Staging   Malignant neoplasm of upper-outer quadrant of right breast in female, estrogen receptor positive (H)  Staging form: Breast, AJCC 8th Edition  - Pathologic stage from 8/31/2023: Stage IB (pT1c, pN2a, cM0, G2, ER+, HI+, HER2-) - Signed by Tony Jarvis MD on 5/9/2024      Assessment & Plan:   1.  She is returning to [place] for winter in a couple weeks and will follow-up with medical oncology.  2.  Continue skin care  3.  Continue range of motion exercises and PT  4. Mammogram indicated-12-months-next in September 2025  5.  Discussed scheduled follow-up with us orfollow-up with return to clinic as needed patient prefers to contact us should any questions or concerns develop in the future to schedule an appointment.     Pain Management Plan: N/A    Total time of this visit, including time spent face-to-face with patient and or via video/audio, and also in preparing for today's  visit for MDM and documentation. Medical decision-making included consideration and possible diagnoses, management options, complex record review, review of diagnostic tests and information, consideration and discussion of significant complications based on comorbidities, discussion with providers involved in the care of the patient.     30 Minutes spent.     This note has been dictated using voice recognition software and as a result may contain minor grammatical errors and unintended word substitutions.      Sherri Jacob MD  Department of Radiation Oncology   [facility] Radiation Oncology  Tel: [phone]  Page: [phone]    [facility]  [address] [place] [address]     [address] [place] [address]    CC:  Patient Care Team:  Leonie Manning MD as PCP - General (Family Medicine)  Tony Jarvis MD as MD (Hematology)  Sherri Jacob MD as MD (Radiation Oncology)  Sherri Jacob MD as Assigned Cancer Care Provider  Laverne Green, RN as Specialty Care Coordinator (Hematology & Oncology)      Again, thank you for allowing me to participate in the care of your patient.        Sincerely,        Sherri Jacob MD

## 2024-11-18 DIAGNOSIS — Z79.811 ENCOUNTER FOR MONITORING AROMATASE INHIBITOR THERAPY: ICD-10-CM

## 2024-11-18 DIAGNOSIS — Z17.0 MALIGNANT NEOPLASM OF UPPER-OUTER QUADRANT OF RIGHT BREAST IN FEMALE, ESTROGEN RECEPTOR POSITIVE (H): ICD-10-CM

## 2024-11-18 DIAGNOSIS — Z51.81 ENCOUNTER FOR MONITORING AROMATASE INHIBITOR THERAPY: ICD-10-CM

## 2024-11-18 DIAGNOSIS — C50.411 MALIGNANT NEOPLASM OF UPPER-OUTER QUADRANT OF RIGHT BREAST IN FEMALE, ESTROGEN RECEPTOR POSITIVE (H): ICD-10-CM

## 2024-11-18 RX ORDER — RIBOCICLIB 200 MG/1
TABLET, FILM COATED ORAL
Qty: 42 TABLET | Refills: 0 | OUTPATIENT
Start: 2024-11-18

## 2024-11-18 NOTE — TELEPHONE ENCOUNTER
Refusing refill request. Patient is due for follow up in clinic. Patient has not been responding to staff, care team unaware if she is in [place] or [place].    Aurora De La Torre RN

## 2024-12-10 ENCOUNTER — TELEPHONE (OUTPATIENT)
Dept: ONCOLOGY | Facility: HOSPITAL | Age: 64
End: 2024-12-10
Payer: COMMERCIAL

## 2024-12-10 NOTE — TELEPHONE ENCOUNTER
I received a phone call today from [facility] requesting a refill of patient's Kisqali.  Per Tati, oral chemo pharmacist, her treatment is currently on hold because she has not been following up with the clinic.  I gave this information to the pharmacy tech today and she was going to attempt to contact the patient and ask that she call the cancer center.  She has no other questions at this time.    Indy Mercado RN on 12/10/2024 at 11:14 AM

## 2024-12-22 ENCOUNTER — HEALTH MAINTENANCE LETTER (OUTPATIENT)
Age: 64
End: 2024-12-22

## 2025-01-02 ENCOUNTER — TELEPHONE (OUTPATIENT)
Dept: ONCOLOGY | Facility: HOSPITAL | Age: 65
End: 2025-01-02

## 2025-01-02 NOTE — TELEPHONE ENCOUNTER
Patient is requesting ribociclib (KISQALI) (400 MG DOSE) 200 MG tablet therapy pack - for a refill, please advise.

## 2025-06-15 ENCOUNTER — HEALTH MAINTENANCE LETTER (OUTPATIENT)
Age: 65
End: 2025-06-15

## 2025-07-15 ENCOUNTER — ONCOLOGY VISIT (OUTPATIENT)
Dept: ONCOLOGY | Facility: HOSPITAL | Age: 65
End: 2025-07-15
Attending: INTERNAL MEDICINE
Payer: COMMERCIAL

## 2025-07-15 ENCOUNTER — PATIENT OUTREACH (OUTPATIENT)
Dept: ONCOLOGY | Facility: HOSPITAL | Age: 65
End: 2025-07-15

## 2025-07-15 ENCOUNTER — LAB (OUTPATIENT)
Dept: INFUSION THERAPY | Facility: HOSPITAL | Age: 65
End: 2025-07-15
Attending: INTERNAL MEDICINE
Payer: COMMERCIAL

## 2025-07-15 VITALS
TEMPERATURE: 98.1 F | RESPIRATION RATE: 16 BRPM | SYSTOLIC BLOOD PRESSURE: 125 MMHG | BODY MASS INDEX: 26.91 KG/M2 | DIASTOLIC BLOOD PRESSURE: 76 MMHG | HEART RATE: 71 BPM | HEIGHT: 63 IN | OXYGEN SATURATION: 97 % | WEIGHT: 151.9 LBS

## 2025-07-15 DIAGNOSIS — Z79.811 ENCOUNTER FOR MONITORING AROMATASE INHIBITOR THERAPY: ICD-10-CM

## 2025-07-15 DIAGNOSIS — Z17.0 MALIGNANT NEOPLASM OF UPPER-OUTER QUADRANT OF RIGHT BREAST IN FEMALE, ESTROGEN RECEPTOR POSITIVE (H): Primary | ICD-10-CM

## 2025-07-15 DIAGNOSIS — Z51.81 ENCOUNTER FOR MONITORING AROMATASE INHIBITOR THERAPY: ICD-10-CM

## 2025-07-15 DIAGNOSIS — C50.411 MALIGNANT NEOPLASM OF UPPER-OUTER QUADRANT OF RIGHT BREAST IN FEMALE, ESTROGEN RECEPTOR POSITIVE (H): Primary | ICD-10-CM

## 2025-07-15 LAB
ALBUMIN SERPL BCG-MCNC: 4.3 G/DL (ref 3.5–5.2)
ALP SERPL-CCNC: 57 U/L (ref 40–150)
ALT SERPL W P-5'-P-CCNC: 23 U/L (ref 0–50)
ANION GAP SERPL CALCULATED.3IONS-SCNC: 9 MMOL/L (ref 7–15)
AST SERPL W P-5'-P-CCNC: 29 U/L (ref 0–45)
BASOPHILS # BLD AUTO: 0 10E3/UL (ref 0–0.2)
BASOPHILS NFR BLD AUTO: 1 %
BILIRUB SERPL-MCNC: 0.3 MG/DL
BUN SERPL-MCNC: 10.1 MG/DL (ref 8–23)
CALCIUM SERPL-MCNC: 10.7 MG/DL (ref 8.8–10.4)
CHLORIDE SERPL-SCNC: 103 MMOL/L (ref 98–107)
CREAT SERPL-MCNC: 0.78 MG/DL (ref 0.51–0.95)
EGFRCR SERPLBLD CKD-EPI 2021: 84 ML/MIN/1.73M2
EOSINOPHIL # BLD AUTO: 0 10E3/UL (ref 0–0.7)
EOSINOPHIL NFR BLD AUTO: 1 %
ERYTHROCYTE [DISTWIDTH] IN BLOOD BY AUTOMATED COUNT: 13.7 % (ref 10–15)
GLUCOSE SERPL-MCNC: 103 MG/DL (ref 70–99)
HCO3 SERPL-SCNC: 24 MMOL/L (ref 22–29)
HCT VFR BLD AUTO: 38.4 % (ref 35–47)
HGB BLD-MCNC: 13.4 G/DL (ref 11.7–15.7)
IMM GRANULOCYTES # BLD: 0 10E3/UL
IMM GRANULOCYTES NFR BLD: 0 %
LYMPHOCYTES # BLD AUTO: 1.1 10E3/UL (ref 0.8–5.3)
LYMPHOCYTES NFR BLD AUTO: 32 %
MCH RBC QN AUTO: 33 PG (ref 26.5–33)
MCHC RBC AUTO-ENTMCNC: 34.9 G/DL (ref 31.5–36.5)
MCV RBC AUTO: 95 FL (ref 78–100)
MONOCYTES # BLD AUTO: 0.5 10E3/UL (ref 0–1.3)
MONOCYTES NFR BLD AUTO: 14 %
NEUTROPHILS # BLD AUTO: 1.8 10E3/UL (ref 1.6–8.3)
NEUTROPHILS NFR BLD AUTO: 52 %
NRBC # BLD AUTO: 0 10E3/UL
NRBC BLD AUTO-RTO: 0 /100
PLATELET # BLD AUTO: 190 10E3/UL (ref 150–450)
POTASSIUM SERPL-SCNC: 3.9 MMOL/L (ref 3.4–5.3)
PROT SERPL-MCNC: 6.3 G/DL (ref 6.4–8.3)
RBC # BLD AUTO: 4.06 10E6/UL (ref 3.8–5.2)
SODIUM SERPL-SCNC: 136 MMOL/L (ref 135–145)
WBC # BLD AUTO: 3.4 10E3/UL (ref 4–11)

## 2025-07-15 PROCEDURE — 36415 COLL VENOUS BLD VENIPUNCTURE: CPT | Performed by: INTERNAL MEDICINE

## 2025-07-15 PROCEDURE — G2211 COMPLEX E/M VISIT ADD ON: HCPCS | Performed by: INTERNAL MEDICINE

## 2025-07-15 PROCEDURE — 85004 AUTOMATED DIFF WBC COUNT: CPT | Performed by: INTERNAL MEDICINE

## 2025-07-15 PROCEDURE — 84155 ASSAY OF PROTEIN SERUM: CPT | Performed by: INTERNAL MEDICINE

## 2025-07-15 PROCEDURE — 99214 OFFICE O/P EST MOD 30 MIN: CPT | Performed by: INTERNAL MEDICINE

## 2025-07-15 PROCEDURE — 99215 OFFICE O/P EST HI 40 MIN: CPT | Performed by: INTERNAL MEDICINE

## 2025-07-15 RX ORDER — ANASTROZOLE 1 MG/1
1 TABLET ORAL DAILY
Qty: 90 TABLET | Refills: 1 | Status: SHIPPED | OUTPATIENT
Start: 2025-07-15

## 2025-07-15 ASSESSMENT — PAIN SCALES - GENERAL: PAINLEVEL_OUTOF10: NO PAIN (0)

## 2025-07-15 NOTE — PROGRESS NOTES
[facility] Hematology and Oncology Progress Note    Patient: Lizbet Nunez  MRN: [id number]  Date of Service: Jul 15, 2025         Reason for Visit    Chief Complaint   Patient presents with    Oncology Clinic Visit     Malignant neoplasm of upper-outer quadrant of right breast in female, estrogen receptor positive (H)       Assessment and Plan     Cancer Staging   Malignant neoplasm of upper-outer quadrant of right breast in female, estrogen receptor positive (H)  Staging form: Breast, AJCC 8th Edition  - Pathologic stage from 8/31/2023: Stage IB (pT1c, pN2a, cM0, G2, ER+, MN+, HER2-) - Signed by Tony Jarvis MD on 5/9/2024      ECOG Performance    0 - Independent     Pain  Pain Score: No Pain (0)    #.  History of clinical stage IIIA/pathologic stage IB ([id number]) invasive carcinoma with mixed ductal and lobular features, grade 2.  ER positive, MN positive, HER2 negative (0 by IHC)    - No new lumps or unusual changes noted post-surgery for cancer with lymph node involvement.  - Joint and muscle pain are present but manageable with exercise.  - Hot flashes and muscle pain are likely side effects of Kisqali and anastrozole.  - Regular blood work is necessary to monitor the condition and medication effects.  - Treatment options discussed: continuation of Kisqali and anastrozole, regular blood work, weight-bearing exercises, calcium and vitamin D supplementation, mammogram scheduling, and coordination of care with [facility].      Plan  - Perform labs today before signing Kisqali prescription to ensure safety and efficacy. They are within normal range.  - Reinforce that she needs blood work every three months to monitor health status.  - Schedule a mammogram for September 2025 to ensure timely breast cancer screening.  - Schedule a follow-up appointment in three months for ongoing monitoring and care.  - Request release of medical records from [facility] to ensure  continuity of care.  - Continue weight-bearing exercises to maintain bone health.  - Refill anastrozole prescription to continue current treatment regimen.    #.  Low bone density   She takes vitamin D and calcium.  I advised her to focus on weightbearing exercises.  Will hold off starting bone strengthener agent such as Reclast. However, the challenging part is the follow up arrangment.      Encounter Diagnoses:    Problem List Items Addressed This Visit          Oncology Diagnoses    Malignant neoplasm of upper-outer quadrant of right breast in female, estrogen receptor positive (H) - Primary    Relevant Medications    anastrozole (ARIMIDEX) 1 MG tablet    Other Relevant Orders    CBC with Platelets & Differential (Completed)    Comprehensive metabolic panel (Completed)    CBC with platelets differential    Comprehensive metabolic panel    Magnesium    Phosphorus    CBC with platelets differential    Comprehensive metabolic panel    Magnesium    Phosphorus       Other    Encounter for monitoring aromatase inhibitor therapy    Relevant Orders    CBC with platelets differential    Comprehensive metabolic panel    Magnesium    Phosphorus    CBC with platelets differential    Comprehensive metabolic panel    Magnesium    Phosphorus        CC: Leonie Manning MD   ______________________________________________________________________________  Oncologic history  7/2023-screening mammogram detected right breast cancer.  Sequent diagnostic mammogram and ultrasound demonstrated 0.8 x 0.6 x 0.6 cm hypoechoic mass 10 o'clock position in the right breast.  No suspicious lymph nodes in the right axilla.   -Core needle biopsy showed invasive lobular carcinoma with pleomorphic features, grade 2.  ER 90%, %, HER2 0 by IHC.    7/27/2023-bilateral breast MRI showed 2 x 1.2 x 1.1 cm right breast cancer without evidence of lymphadenopathy.    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    8/31/2023-right breast  lumpectomy and right axillary sentinel lymph node biopsy.   Invasive carcinoma with mixed ductal and pleomorphic lobular features, grade 2.  2 cm.  Margins were negative.   DCIS present with closest skin margin to 1 mm.   4/4 sentinel lymph nodes were positive (2 macro mets and 2 micro mets) for metastatic carcinoma with largest metastatic deposit of 5 mm.  Negative for extranodal extension.   DCIS and LCIS present.  ER 90%, MN 80%, HER2 1+ by IHC.   pT1c N2a M0    9/26/2023-PET scan showed no evidence of distant metastasis    10/12/2023-right axillary lymph node dissection   1 of 20 lymph node showed metastatic adenocarcinoma, 4 mm in size.  No extranodal extension.    11/21/2023-4/23/2024-completed adjuvant chemotherapy with dose dense AC followed by weekly paclitaxel in [place].    4/24/2024-CT scan showed interval development of small pericardial effusion.  No pleural effusion.  She has seroma in the right axilla.  No next of malignancy.    7/11/2024- started anastrozole    7/22/2024- started abemaciclib.  Stopped on 8/15/2024 due to intolerable and undesirable side effects of diarrhea.    9/12/2024-started ribociclib.      History of Present Illness    Ms. Lizbet Nunez presented today for follow up.   History of Present Illness-  Lizbet Nunez, a 64-year-old female, reports that she has been managing her medications, including Kisqali and anastrozole, with the latter being obtained from [facility]. She mentions experiencing joint and muscle pain, which she attributes to her medications, but notes that regular exercise has helped mitigate these symptoms. She recalls having surgery for cancer involving lymph nodes, which still causes some soreness, but she has not noticed any new lumps.    She has been engaging in exercises and therapy to manage lymphedema, which she describes as not too severe. She came in for blood tests to ensure everything is fine, as she understands the importance of regular monitoring.  "She experiences side effects from her medications, including hot flashes, which she believes are related to anastrozole, and continues to take calcium and vitamin D supplements once a day.    Lizbet mentions that her mammogram is typically done at [facility], with the next one due in September. She plans to coordinate this with her physical examination in August. She reports occasional headaches, noting that her head hurts when she stubs her toes, but does not mention any other unusual symptoms or changes. She confirms that she stayed in [place] from November to April. She told me that she has been taking Kisqali since last Sept. She was not responding to our communication since last visit here in Oct 2024 until now.    Review of systems  Apart from describing in HPI, the remainder of comprehensive ROS was negative.    Past History    No past medical history on file.    Past Surgical History:   Procedure Laterality Date    IR CHEST PORT PLACEMENT > 5 YRS OF AGE  11/7/2023    IR PORT REMOVAL LEFT  8/5/2024       Physical Exam    /76   Pulse 71   Temp 98.1  F (36.7  C)   Resp 16   Ht 1.6 m (5' 3\")   Wt 68.9 kg (151 lb 14.4 oz)   SpO2 97%   BMI 26.91 kg/m      General: alert, awake, not in acute distress  HEENT: Head: Normal, normocephalic, atraumatic.  Eye: Normal external eye, conjunctiva, lids cornea, YUMIKO.  Nose: Normal external nose, mucus membranes and septum.  Pharynx: Normal buccal mucosa. Normal pharynx.  Neck / Thyroid: Supple, no masses, nodes, nodules or enlargement.  Lymphatics: No abnormally enlarged lymph nodes.  Chest: Normal chest wall and respirations. Clear to auscultation.  Heart: S1 S2 RRR, no murmur.   Abdomen: abdomen is soft without significant tenderness, masses, organomegaly or guarding  Extremities: normal strength, tone, and muscle mass  Skin: normal. no rash or abnormalities  CNS: non focal.    Lab Results    Recent Results (from the past 240 hours)   Comprehensive metabolic " panel    Collection Time: 07/15/25  3:26 PM   Result Value Ref Range    Sodium 136 135 - 145 mmol/L    Potassium 3.9 3.4 - 5.3 mmol/L    Carbon Dioxide (CO2) 24 22 - 29 mmol/L    Anion Gap 9 7 - 15 mmol/L    Urea Nitrogen 10.1 8.0 - 23.0 mg/dL    Creatinine 0.78 0.51 - 0.95 mg/dL    GFR Estimate 84 >60 mL/min/1.73m2    Calcium 10.7 (H) 8.8 - 10.4 mg/dL    Chloride 103 98 - 107 mmol/L    Glucose 103 (H) 70 - 99 mg/dL    Alkaline Phosphatase 57 40 - 150 U/L    AST 29 0 - 45 U/L    ALT 23 0 - 50 U/L    Protein Total 6.3 (L) 6.4 - 8.3 g/dL    Albumin 4.3 3.5 - 5.2 g/dL    Bilirubin Total 0.3 <=1.2 mg/dL   CBC with platelets and differential    Collection Time: 07/15/25  3:26 PM   Result Value Ref Range    WBC Count 3.4 (L) 4.0 - 11.0 10e3/uL    RBC Count 4.06 3.80 - 5.20 10e6/uL    Hemoglobin 13.4 11.7 - 15.7 g/dL    Hematocrit 38.4 35.0 - 47.0 %    MCV 95 78 - 100 fL    MCH 33.0 26.5 - 33.0 pg    MCHC 34.9 31.5 - 36.5 g/dL    RDW 13.7 10.0 - 15.0 %    Platelet Count 190 150 - 450 10e3/uL    % Neutrophils 52 %    % Lymphocytes 32 %    % Monocytes 14 %    % Eosinophils 1 %    % Basophils 1 %    % Immature Granulocytes 0 %    NRBCs per 100 WBC 0 <1 /100    Absolute Neutrophils 1.8 1.6 - 8.3 10e3/uL    Absolute Lymphocytes 1.1 0.8 - 5.3 10e3/uL    Absolute Monocytes 0.5 0.0 - 1.3 10e3/uL    Absolute Eosinophils 0.0 0.0 - 0.7 10e3/uL    Absolute Basophils 0.0 0.0 - 0.2 10e3/uL    Absolute Immature Granulocytes 0.0 <=0.4 10e3/uL    Absolute NRBCs 0.0 10e3/uL           Imaging    No results found.    The longitudinal plan of care for the diagnosis(es)/condition(s) as documented were addressed during this visit. Due to the added complexity in care, I will continue to support Lizbet in the subsequent management and with ongoing continuity of care.     45 minutes spent by me on the date of the encounter doing chart review, history and exam, documentation and further activities as noted above.    Consent was obtained from the  patient to use an AI documentation tool in the creation of this note.    Signed by: Tony Jarvis MD

## 2025-07-15 NOTE — PROGRESS NOTES
"Oncology Rooming Note    July 15, 2025 2:58 PM   Lizbet Nunez is a 64 year old female who presents for:    Chief Complaint   Patient presents with    Oncology Clinic Visit     Malignant neoplasm of upper-outer quadrant of right breast in female, estrogen receptor positive (H)     Initial Vitals: /76   Pulse 71   Temp 98.1  F (36.7  C)   Resp 16   Ht 1.6 m (5' 3\")   Wt 68.9 kg (151 lb 14.4 oz)   SpO2 97%   BMI 26.91 kg/m   Estimated body mass index is 26.91 kg/m  as calculated from the following:    Height as of this encounter: 1.6 m (5' 3\").    Weight as of this encounter: 68.9 kg (151 lb 14.4 oz). Body surface area is 1.75 meters squared.  No Pain (0) Comment: Data Unavailable   No LMP recorded.  Allergies reviewed: Yes  Medications reviewed: Yes    Medications: yes. Yes.  Pharmacy name entered into [facility]:    [id number] - [place], [place] - [address] [facility] - [place], [place] - [address] [facility] [place], [place] - [address]    PHQ9:  Did this patient require a PHQ9?: No      Clinical concerns: None      Tati Reina LPN             "

## 2025-07-15 NOTE — PROGRESS NOTES
"[facility]: Cancer Care Follow-Up Note                                    Discussion with Patient:                                                      Checked in with patient ahead of her clinic visit with Dr. Jarvis. Patient alone today.     Dates of Treatment:                                                      Infusion given in last 28 days       None          Treatment Plan Medications       Oral ONC Breast Cancer - Ribociclib / Aromatase Inhibitor       Take Home Medications       Medication Sig Start/End Day/Cycle Status    ribociclib (KISQALI) (400 MG DOSE) 200 MG tablet therapy pack Take 2 tablets (400 mg) by mouth every morning for 21 days. <span hidden class=\"[email or web address]\"></span>, then off for 7 days. S to S+21 Day 1, Cycle 3 - 11/7/2024 Unsigned                            Assessment:                                                      Patient reports she has been taking Kisqali and anastrozole. She said she is due to start her next cycle of Kisqali tomorrow, 7/16/25.  She is unsure who has been refilling her medications.  She coughlin in [place] from Nov-April/May and is seen at [facility] in [place], [place].    Patient reports she will be on Medicare in Sept.     Intervention/Education provided during outreach:                                                       Patient instructed to give copy a copy of her Medicare card to  when she gets it. She said she has it today and will stop and give it to them before she leaves today.     Raji Rowell, Oral Chemo Pharmacist, verbally updated on patient.    Patient to follow up as scheduled at next appt    Signature:  Laverne Green RN July 15, 2025 4:01 PM       "

## 2025-07-15 NOTE — LETTER
"7/15/2025      Lizbet Nunez  [address] [place] [address]      Dear Colleague,    Thank you for referring your patient, Lizbet Nunez, to the [facility] CANC[facility] Please see a copy of my visit note below.    Oncology Rooming Note    July 15, 2025 2:58 PM   Lizbet Nunez is a 64 year old female who presents for:    Chief Complaint   Patient presents with     Oncology Clinic Visit     Malignant neoplasm of upper-outer quadrant of right breast in female, estrogen receptor positive (H)     Initial Vitals: /76   Pulse 71   Temp 98.1  F (36.7  C)   Resp 16   Ht 1.6 m (5' 3\")   Wt 68.9 kg (151 lb 14.4 oz)   SpO2 97%   BMI 26.91 kg/m   Estimated body mass index is 26.91 kg/m  as calculated from the following:    Height as of this encounter: 1.6 m (5' 3\").    Weight as of this encounter: 68.9 kg (151 lb 14.4 oz). Body surface area is 1.75 meters squared.  No Pain (0) Comment: Data Unavailable   No LMP recorded.  Allergies reviewed: Yes  Medications reviewed: Yes    Medications: yes. Yes.  Pharmacy name entered into [employer]:    [id number] - [place], [place] - [address] AT [facility] - [place], [place] - [address] [facility] [place], [place] - [address]    PHQ9:  Did this patient require a PHQ9?: No      Clinical concerns: None      Tati Reina LPN               [facility] Hematology and Oncology Progress Note    Patient: Lizbet Nunez  MRN: [id number]  Date of Service: Jul 15, 2025         Reason for Visit    Chief Complaint   Patient presents with     Oncology Clinic Visit     Malignant neoplasm of upper-outer quadrant of right breast in female, estrogen receptor positive (H)       Assessment and Plan     Cancer Staging   Malignant neoplasm of upper-outer quadrant of right breast in female, estrogen receptor positive (H)  Staging form: Breast, AJCC 8th Edition  - " Pathologic stage from 8/31/2023: Stage IB (pT1c, pN2a, cM0, G2, ER+, MA+, HER2-) - Signed by Tony Jarvis MD on 5/9/2024      ECOG Performance    0 - Independent     Pain  Pain Score: No Pain (0)    #.  History of clinical stage IIIA/pathologic stage IB ([id number]) invasive carcinoma with mixed ductal and lobular features, grade 2.  ER positive, MA positive, HER2 negative (0 by IHC)    - No new lumps or unusual changes noted post-surgery for cancer with lymph node involvement.  - Joint and muscle pain are present but manageable with exercise.  - Hot flashes and muscle pain are likely side effects of Kisqali and anastrozole.  - Regular blood work is necessary to monitor the condition and medication effects.  - Treatment options discussed: continuation of Kisqali and anastrozole, regular blood work, weight-bearing exercises, calcium and vitamin D supplementation, mammogram scheduling, and coordination of care with [facility].      Plan  - Perform labs today before signing Kisqali prescription to ensure safety and efficacy. They are within normal range.  - Reinforce that she needs blood work every three months to monitor health status.  - Schedule a mammogram for September 2025 to ensure timely breast cancer screening.  - Schedule a follow-up appointment in three months for ongoing monitoring and care.  - Request release of medical records from [facility] to ensure continuity of care.  - Continue weight-bearing exercises to maintain bone health.  - Refill anastrozole prescription to continue current treatment regimen.    #.  Low bone density   She takes vitamin D and calcium.  I advised her to focus on weightbearing exercises.  Will hold off starting bone strengthener agent such as Reclast. However, the challenging part is the follow up arrangment.      Encounter Diagnoses:    Problem List Items Addressed This Visit          Oncology Diagnoses    Malignant neoplasm of  upper-outer quadrant of right breast in female, estrogen receptor positive (H) - Primary    Relevant Medications    anastrozole (ARIMIDEX) 1 MG tablet    Other Relevant Orders    CBC with Platelets & Differential (Completed)    Comprehensive metabolic panel (Completed)    CBC with platelets differential    Comprehensive metabolic panel    Magnesium    Phosphorus    CBC with platelets differential    Comprehensive metabolic panel    Magnesium    Phosphorus       Other    Encounter for monitoring aromatase inhibitor therapy    Relevant Orders    CBC with platelets differential    Comprehensive metabolic panel    Magnesium    Phosphorus    CBC with platelets differential    Comprehensive metabolic panel    Magnesium    Phosphorus        CC: Leonie Manning MD   ______________________________________________________________________________  Oncologic history  7/2023-screening mammogram detected right breast cancer.  Sequent diagnostic mammogram and ultrasound demonstrated 0.8 x 0.6 x 0.6 cm hypoechoic mass 10 o'clock position in the right breast.  No suspicious lymph nodes in the right axilla.   -Core needle biopsy showed invasive lobular carcinoma with pleomorphic features, grade 2.  ER 90%, %, HER2 0 by IHC.    7/27/2023-bilateral breast MRI showed 2 x 1.2 x 1.1 cm right breast cancer without evidence of lymphadenopathy.    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    8/31/2023-right breast lumpectomy and right axillary sentinel lymph node biopsy.   Invasive carcinoma with mixed ductal and pleomorphic lobular features, grade 2.  2 cm.  Margins were negative.   DCIS present with closest skin margin to 1 mm.   4/4 sentinel lymph nodes were positive (2 macro mets and 2 micro mets) for metastatic carcinoma with largest metastatic deposit of 5 mm.  Negative for extranodal extension.   DCIS and LCIS present.  ER 90%, HI 80%, HER2 1+ by IHC.   pT1c N2a M0    9/26/2023-PET scan showed no evidence of distant  metastasis    10/12/2023-right axillary lymph node dissection   1 of 20 lymph node showed metastatic adenocarcinoma, 4 mm in size.  No extranodal extension.    11/21/2023-4/23/2024-completed adjuvant chemotherapy with dose dense AC followed by weekly paclitaxel in [place].    4/24/2024-CT scan showed interval development of small pericardial effusion.  No pleural effusion.  She has seroma in the right axilla.  No next of malignancy.    7/11/2024- started anastrozole    7/22/2024- started abemaciclib.  Stopped on 8/15/2024 due to intolerable and undesirable side effects of diarrhea.    9/12/2024-started ribociclib.      History of Present Illness    Ms. Lizbet Nunez presented today for follow up.   History of Present Illness-  Lizbet Nunez, a 64-year-old female, reports that she has been managing her medications, including Kisqali and anastrozole, with the latter being obtained from [employer]. She mentions experiencing joint and muscle pain, which she attributes to her medications, but notes that regular exercise has helped mitigate these symptoms. She recalls having surgery for cancer involving lymph nodes, which still causes some soreness, but she has not noticed any new lumps.    She has been engaging in exercises and therapy to manage lymphedema, which she describes as not too severe. She came in for blood tests to ensure everything is fine, as she understands the importance of regular monitoring. She experiences side effects from her medications, including hot flashes, which she believes are related to anastrozole, and continues to take calcium and vitamin D supplements once a day.    Lizbet mentions that her mammogram is typically done at [facility], with the next one due in September. She plans to coordinate this with her physical examination in August. She reports occasional headaches, noting that her head hurts when she stubs her toes, but does not mention any other unusual symptoms or changes. She  "confirms that she stayed in [place] from November to April. She told me that she has been taking Kisqali since last Sept. She was not responding to our communication since last visit here in Oct 2024 until now.    Review of systems  Apart from describing in HPI, the remainder of comprehensive ROS was negative.    Past History    No past medical history on file.    Past Surgical History:   Procedure Laterality Date     IR CHEST PORT PLACEMENT > 5 YRS OF AGE  11/7/2023     IR PORT REMOVAL LEFT  8/5/2024       Physical Exam    /76   Pulse 71   Temp 98.1  F (36.7  C)   Resp 16   Ht 1.6 m (5' 3\")   Wt 68.9 kg (151 lb 14.4 oz)   SpO2 97%   BMI 26.91 kg/m      General: alert, awake, not in acute distress  HEENT: Head: Normal, normocephalic, atraumatic.  Eye: Normal external eye, conjunctiva, lids cornea, YUMIKO.  Nose: Normal external nose, mucus membranes and septum.  Pharynx: Normal buccal mucosa. Normal pharynx.  Neck / Thyroid: Supple, no masses, nodes, nodules or enlargement.  Lymphatics: No abnormally enlarged lymph nodes.  Chest: Normal chest wall and respirations. Clear to auscultation.  Heart: S1 S2 RRR, no murmur.   Abdomen: abdomen is soft without significant tenderness, masses, organomegaly or guarding  Extremities: normal strength, tone, and muscle mass  Skin: normal. no rash or abnormalities  CNS: non focal.    Lab Results    Recent Results (from the past 240 hours)   Comprehensive metabolic panel    Collection Time: 07/15/25  3:26 PM   Result Value Ref Range    Sodium 136 135 - 145 mmol/L    Potassium 3.9 3.4 - 5.3 mmol/L    Carbon Dioxide (CO2) 24 22 - 29 mmol/L    Anion Gap 9 7 - 15 mmol/L    Urea Nitrogen 10.1 8.0 - 23.0 mg/dL    Creatinine 0.78 0.51 - 0.95 mg/dL    GFR Estimate 84 >60 mL/min/1.73m2    Calcium 10.7 (H) 8.8 - 10.4 mg/dL    Chloride 103 98 - 107 mmol/L    Glucose 103 (H) 70 - 99 mg/dL    Alkaline Phosphatase 57 40 - 150 U/L    AST 29 0 - 45 U/L    ALT 23 0 - 50 U/L    Protein " Total 6.3 (L) 6.4 - 8.3 g/dL    Albumin 4.3 3.5 - 5.2 g/dL    Bilirubin Total 0.3 <=1.2 mg/dL   CBC with platelets and differential    Collection Time: 07/15/25  3:26 PM   Result Value Ref Range    WBC Count 3.4 (L) 4.0 - 11.0 10e3/uL    RBC Count 4.06 3.80 - 5.20 10e6/uL    Hemoglobin 13.4 11.7 - 15.7 g/dL    Hematocrit 38.4 35.0 - 47.0 %    MCV 95 78 - 100 fL    MCH 33.0 26.5 - 33.0 pg    MCHC 34.9 31.5 - 36.5 g/dL    RDW 13.7 10.0 - 15.0 %    Platelet Count 190 150 - 450 10e3/uL    % Neutrophils 52 %    % Lymphocytes 32 %    % Monocytes 14 %    % Eosinophils 1 %    % Basophils 1 %    % Immature Granulocytes 0 %    NRBCs per 100 WBC 0 <1 /100    Absolute Neutrophils 1.8 1.6 - 8.3 10e3/uL    Absolute Lymphocytes 1.1 0.8 - 5.3 10e3/uL    Absolute Monocytes 0.5 0.0 - 1.3 10e3/uL    Absolute Eosinophils 0.0 0.0 - 0.7 10e3/uL    Absolute Basophils 0.0 0.0 - 0.2 10e3/uL    Absolute Immature Granulocytes 0.0 <=0.4 10e3/uL    Absolute NRBCs 0.0 10e3/uL           Imaging    No results found.    The longitudinal plan of care for the diagnosis(es)/condition(s) as documented were addressed during this visit. Due to the added complexity in care, I will continue to support Lizbet in the subsequent management and with ongoing continuity of care.     45 minutes spent by me on the date of the encounter doing chart review, history and exam, documentation and further activities as noted above.    Consent was obtained from the patient to use an AI documentation tool in the creation of this note.    Signed by: Tony Jarvis MD    Again, thank you for allowing me to participate in the care of your patient.        Sincerely,        Tony Jarvis MD    Electronically signed

## 2025-07-16 DIAGNOSIS — Z79.811 ENCOUNTER FOR MONITORING AROMATASE INHIBITOR THERAPY: ICD-10-CM

## 2025-07-16 DIAGNOSIS — Z17.0 MALIGNANT NEOPLASM OF UPPER-OUTER QUADRANT OF RIGHT BREAST IN FEMALE, ESTROGEN RECEPTOR POSITIVE (H): Primary | ICD-10-CM

## 2025-07-16 DIAGNOSIS — C50.411 MALIGNANT NEOPLASM OF UPPER-OUTER QUADRANT OF RIGHT BREAST IN FEMALE, ESTROGEN RECEPTOR POSITIVE (H): Primary | ICD-10-CM

## 2025-07-16 DIAGNOSIS — Z51.81 ENCOUNTER FOR MONITORING AROMATASE INHIBITOR THERAPY: ICD-10-CM

## 2025-07-27 ENCOUNTER — HEALTH MAINTENANCE LETTER (OUTPATIENT)
Age: 65
End: 2025-07-27

## (undated) RX ORDER — LIDOCAINE HYDROCHLORIDE AND EPINEPHRINE 10; 10 MG/ML; UG/ML
INJECTION, SOLUTION INFILTRATION; PERINEURAL
Status: DISPENSED
Start: 2024-08-05

## (undated) RX ORDER — LIDOCAINE HYDROCHLORIDE 10 MG/ML
INJECTION, SOLUTION INFILTRATION; PERINEURAL
Status: DISPENSED
Start: 2024-08-05

## (undated) RX ORDER — FENTANYL CITRATE 50 UG/ML
INJECTION, SOLUTION INTRAMUSCULAR; INTRAVENOUS
Status: DISPENSED
Start: 2024-08-05